# Patient Record
Sex: MALE | Race: WHITE | Employment: OTHER | URBAN - NONMETROPOLITAN AREA
[De-identification: names, ages, dates, MRNs, and addresses within clinical notes are randomized per-mention and may not be internally consistent; named-entity substitution may affect disease eponyms.]

---

## 2017-01-17 ENCOUNTER — OFFICE VISIT (OUTPATIENT)
Dept: UROLOGY | Age: 67
End: 2017-01-17

## 2017-01-17 VITALS — WEIGHT: 210 LBS | BODY MASS INDEX: 28.44 KG/M2 | HEIGHT: 72 IN

## 2017-01-17 DIAGNOSIS — R97.20 ELEVATED PSA: Primary | ICD-10-CM

## 2017-01-17 LAB
BILIRUBIN, POC: NORMAL
BLOOD URINE, POC: NORMAL
CLARITY, POC: NORMAL
COLOR, POC: NORMAL
GLUCOSE URINE, POC: NORMAL
KETONES, POC: NORMAL
LEUKOCYTE EST, POC: NORMAL
NITRITE, POC: NORMAL
PH, POC: NORMAL
PROTEIN, POC: NORMAL
SPECIFIC GRAVITY, POC: NORMAL
UROBILINOGEN, POC: NORMAL

## 2017-01-17 PROCEDURE — G8484 FLU IMMUNIZE NO ADMIN: HCPCS | Performed by: NURSE PRACTITIONER

## 2017-01-17 PROCEDURE — 99214 OFFICE O/P EST MOD 30 MIN: CPT | Performed by: NURSE PRACTITIONER

## 2017-01-17 PROCEDURE — 4040F PNEUMOC VAC/ADMIN/RCVD: CPT | Performed by: NURSE PRACTITIONER

## 2017-01-17 PROCEDURE — 4004F PT TOBACCO SCREEN RCVD TLK: CPT | Performed by: NURSE PRACTITIONER

## 2017-01-17 PROCEDURE — 3017F COLORECTAL CA SCREEN DOC REV: CPT | Performed by: NURSE PRACTITIONER

## 2017-01-17 PROCEDURE — 81003 URINALYSIS AUTO W/O SCOPE: CPT | Performed by: NURSE PRACTITIONER

## 2017-01-17 PROCEDURE — G8420 CALC BMI NORM PARAMETERS: HCPCS | Performed by: NURSE PRACTITIONER

## 2017-01-17 PROCEDURE — 1123F ACP DISCUSS/DSCN MKR DOCD: CPT | Performed by: NURSE PRACTITIONER

## 2017-01-17 PROCEDURE — G8427 DOCREV CUR MEDS BY ELIG CLIN: HCPCS | Performed by: NURSE PRACTITIONER

## 2017-06-15 LAB — PROSTATE SPECIFIC ANTIGEN: 6.54 NG/ML

## 2017-06-20 ENCOUNTER — OFFICE VISIT (OUTPATIENT)
Dept: UROLOGY | Age: 67
End: 2017-06-20

## 2017-06-20 VITALS — WEIGHT: 200 LBS | BODY MASS INDEX: 27.09 KG/M2 | HEIGHT: 72 IN

## 2017-06-20 DIAGNOSIS — R97.20 ELEVATED PSA: Primary | ICD-10-CM

## 2017-06-20 PROCEDURE — 3017F COLORECTAL CA SCREEN DOC REV: CPT | Performed by: NURSE PRACTITIONER

## 2017-06-20 PROCEDURE — 1123F ACP DISCUSS/DSCN MKR DOCD: CPT | Performed by: NURSE PRACTITIONER

## 2017-06-20 PROCEDURE — G8419 CALC BMI OUT NRM PARAM NOF/U: HCPCS | Performed by: NURSE PRACTITIONER

## 2017-06-20 PROCEDURE — 4004F PT TOBACCO SCREEN RCVD TLK: CPT | Performed by: NURSE PRACTITIONER

## 2017-06-20 PROCEDURE — 81002 URINALYSIS NONAUTO W/O SCOPE: CPT | Performed by: NURSE PRACTITIONER

## 2017-06-20 PROCEDURE — 4040F PNEUMOC VAC/ADMIN/RCVD: CPT | Performed by: NURSE PRACTITIONER

## 2017-06-20 PROCEDURE — 99213 OFFICE O/P EST LOW 20 MIN: CPT | Performed by: NURSE PRACTITIONER

## 2017-06-20 PROCEDURE — G8427 DOCREV CUR MEDS BY ELIG CLIN: HCPCS | Performed by: NURSE PRACTITIONER

## 2017-06-20 RX ORDER — COLCHICINE 0.6 MG/1
0.6 TABLET ORAL DAILY
COMMUNITY
End: 2017-08-17 | Stop reason: ALTCHOICE

## 2017-06-27 ENCOUNTER — TELEPHONE (OUTPATIENT)
Dept: UROLOGY | Age: 67
End: 2017-06-27

## 2017-06-27 RX ORDER — GENTAMICIN SULFATE 40 MG/ML
80 INJECTION, SOLUTION INTRAMUSCULAR; INTRAVENOUS ONCE
Qty: 2 ML | Refills: 0 | Status: SHIPPED | OUTPATIENT
Start: 2017-06-27 | End: 2017-06-27

## 2017-06-27 RX ORDER — CIPROFLOXACIN 500 MG/1
500 TABLET, FILM COATED ORAL 2 TIMES DAILY
Qty: 6 TABLET | Refills: 0 | Status: SHIPPED | OUTPATIENT
Start: 2017-06-27 | End: 2017-07-03

## 2017-06-28 ENCOUNTER — TELEPHONE (OUTPATIENT)
Dept: UROLOGY | Age: 67
End: 2017-06-28

## 2017-08-03 ENCOUNTER — PROCEDURE VISIT (OUTPATIENT)
Dept: UROLOGY | Age: 67
End: 2017-08-03
Payer: MEDICARE

## 2017-08-03 VITALS
DIASTOLIC BLOOD PRESSURE: 72 MMHG | BODY MASS INDEX: 28.04 KG/M2 | WEIGHT: 207 LBS | SYSTOLIC BLOOD PRESSURE: 118 MMHG | HEIGHT: 72 IN

## 2017-08-03 DIAGNOSIS — R97.20 ELEVATED PSA: Primary | ICD-10-CM

## 2017-08-03 PROCEDURE — 55700 PR BIOPSY OF PROSTATE,NEEDLE/PUNCH: CPT | Performed by: UROLOGY

## 2017-08-03 PROCEDURE — 76872 US TRANSRECTAL: CPT | Performed by: UROLOGY

## 2017-08-03 PROCEDURE — 99999 PR OFFICE/OUTPT VISIT,PROCEDURE ONLY: CPT | Performed by: UROLOGY

## 2017-08-03 PROCEDURE — 4004F PT TOBACCO SCREEN RCVD TLK: CPT | Performed by: UROLOGY

## 2017-08-03 PROCEDURE — 96372 THER/PROPH/DIAG INJ SC/IM: CPT | Performed by: UROLOGY

## 2017-08-03 PROCEDURE — 76942 ECHO GUIDE FOR BIOPSY: CPT | Performed by: UROLOGY

## 2017-08-03 RX ORDER — GENTAMICIN SULFATE 40 MG/ML
80 INJECTION, SOLUTION INTRAMUSCULAR; INTRAVENOUS ONCE
Status: COMPLETED | OUTPATIENT
Start: 2017-08-03 | End: 2017-08-03

## 2017-08-03 RX ORDER — ALLOPURINOL 100 MG/1
100 TABLET ORAL 2 TIMES DAILY
Status: ON HOLD | COMMUNITY
End: 2020-02-26 | Stop reason: DRUGHIGH

## 2017-08-03 RX ADMIN — GENTAMICIN SULFATE 80 MG: 40 INJECTION, SOLUTION INTRAMUSCULAR; INTRAVENOUS at 10:56

## 2017-08-17 ENCOUNTER — TELEPHONE (OUTPATIENT)
Dept: UROLOGY | Age: 67
End: 2017-08-17

## 2017-08-17 ENCOUNTER — OFFICE VISIT (OUTPATIENT)
Dept: UROLOGY | Age: 67
End: 2017-08-17
Payer: MEDICARE

## 2017-08-17 VITALS
WEIGHT: 200 LBS | HEIGHT: 72 IN | DIASTOLIC BLOOD PRESSURE: 68 MMHG | BODY MASS INDEX: 27.09 KG/M2 | SYSTOLIC BLOOD PRESSURE: 112 MMHG

## 2017-08-17 DIAGNOSIS — C61 PROSTATE CANCER (HCC): Primary | ICD-10-CM

## 2017-08-17 DIAGNOSIS — R30.0 DYSURIA: ICD-10-CM

## 2017-08-17 LAB
BILIRUBIN URINE: NEGATIVE
BLOOD URINE, POC: NORMAL
CHARACTER, URINE: CLEAR
COLOR, URINE: YELLOW
GLUCOSE URINE: NEGATIVE MG/DL
KETONES, URINE: NEGATIVE
LEUKOCYTE CLUMPS, URINE: NEGATIVE
NITRITE, URINE: NEGATIVE
PH, URINE: 5.5
PROTEIN, URINE: NEGATIVE MG/DL
SPECIFIC GRAVITY, URINE: 1.02 (ref 1–1.03)
UROBILINOGEN, URINE: 0.2 EU/DL

## 2017-08-17 PROCEDURE — 3017F COLORECTAL CA SCREEN DOC REV: CPT | Performed by: UROLOGY

## 2017-08-17 PROCEDURE — 4004F PT TOBACCO SCREEN RCVD TLK: CPT | Performed by: UROLOGY

## 2017-08-17 PROCEDURE — 99215 OFFICE O/P EST HI 40 MIN: CPT | Performed by: UROLOGY

## 2017-08-17 PROCEDURE — G8428 CUR MEDS NOT DOCUMENT: HCPCS | Performed by: UROLOGY

## 2017-08-17 PROCEDURE — G8417 CALC BMI ABV UP PARAM F/U: HCPCS | Performed by: UROLOGY

## 2017-08-17 PROCEDURE — 1123F ACP DISCUSS/DSCN MKR DOCD: CPT | Performed by: UROLOGY

## 2017-08-17 PROCEDURE — 4040F PNEUMOC VAC/ADMIN/RCVD: CPT | Performed by: UROLOGY

## 2017-08-17 PROCEDURE — 81003 URINALYSIS AUTO W/O SCOPE: CPT | Performed by: UROLOGY

## 2017-08-17 RX ORDER — GLUCOSAMINE SULFATE 500 MG
1 CAPSULE ORAL DAILY
COMMUNITY

## 2017-08-17 ASSESSMENT — ENCOUNTER SYMPTOMS
EYE REDNESS: 0
CONSTIPATION: 0
DIARRHEA: 0
EYE PAIN: 0
SHORTNESS OF BREATH: 0
CHEST TIGHTNESS: 0

## 2017-08-22 DIAGNOSIS — C61 PROSTATE CANCER (HCC): Primary | ICD-10-CM

## 2017-09-21 ENCOUNTER — OFFICE VISIT (OUTPATIENT)
Dept: UROLOGY | Age: 67
End: 2017-09-21
Payer: MEDICARE

## 2017-09-21 ENCOUNTER — TELEPHONE (OUTPATIENT)
Dept: UROLOGY | Age: 67
End: 2017-09-21

## 2017-09-21 VITALS
DIASTOLIC BLOOD PRESSURE: 70 MMHG | HEIGHT: 72 IN | WEIGHT: 209 LBS | SYSTOLIC BLOOD PRESSURE: 122 MMHG | BODY MASS INDEX: 28.31 KG/M2

## 2017-09-21 DIAGNOSIS — Z01.818 PRE-OP TESTING: Primary | ICD-10-CM

## 2017-09-21 DIAGNOSIS — C61 PROSTATE CANCER (HCC): ICD-10-CM

## 2017-09-21 DIAGNOSIS — I10 ESSENTIAL HYPERTENSION: ICD-10-CM

## 2017-09-21 DIAGNOSIS — C61 MALIGNANT NEOPLASM OF PROSTATE (HCC): Primary | ICD-10-CM

## 2017-09-21 LAB
BILIRUBIN URINE: NEGATIVE
BLOOD URINE, POC: NORMAL
CHARACTER, URINE: CLEAR
COLOR, URINE: YELLOW
GLUCOSE URINE: NEGATIVE MG/DL
KETONES, URINE: NEGATIVE
LEUKOCYTE CLUMPS, URINE: NEGATIVE
NITRITE, URINE: NEGATIVE
PH, URINE: 5
PROTEIN, URINE: NEGATIVE MG/DL
SPECIFIC GRAVITY, URINE: <= 1.005 (ref 1–1.03)
UROBILINOGEN, URINE: 0.2 EU/DL

## 2017-09-21 PROCEDURE — 3017F COLORECTAL CA SCREEN DOC REV: CPT | Performed by: UROLOGY

## 2017-09-21 PROCEDURE — G8427 DOCREV CUR MEDS BY ELIG CLIN: HCPCS | Performed by: UROLOGY

## 2017-09-21 PROCEDURE — 4040F PNEUMOC VAC/ADMIN/RCVD: CPT | Performed by: UROLOGY

## 2017-09-21 PROCEDURE — 81003 URINALYSIS AUTO W/O SCOPE: CPT | Performed by: UROLOGY

## 2017-09-21 PROCEDURE — G8417 CALC BMI ABV UP PARAM F/U: HCPCS | Performed by: UROLOGY

## 2017-09-21 PROCEDURE — 1123F ACP DISCUSS/DSCN MKR DOCD: CPT | Performed by: UROLOGY

## 2017-09-21 PROCEDURE — 4004F PT TOBACCO SCREEN RCVD TLK: CPT | Performed by: UROLOGY

## 2017-09-21 PROCEDURE — 99215 OFFICE O/P EST HI 40 MIN: CPT | Performed by: UROLOGY

## 2017-09-21 ASSESSMENT — ENCOUNTER SYMPTOMS
EYE REDNESS: 0
BACK PAIN: 0
VOMITING: 0
SHORTNESS OF BREATH: 0
CHEST TIGHTNESS: 0
DIARRHEA: 0
EYE PAIN: 0

## 2017-09-29 LAB
ALBUMIN SERPL-MCNC: 4 G/DL
ALP BLD-CCNC: 53 U/L
ALT SERPL-CCNC: 52 U/L
ANION GAP SERPL CALCULATED.3IONS-SCNC: 17 MMOL/L
AST SERPL-CCNC: 29 U/L
BASOPHILS ABSOLUTE: 0.1 /ΜL
BASOPHILS RELATIVE PERCENT: 0.8 %
BILIRUB SERPL-MCNC: 0.6 MG/DL (ref 0.1–1.4)
BUN BLDV-MCNC: 16 MG/DL
CALCIUM SERPL-MCNC: 9.1 MG/DL
CHLORIDE BLD-SCNC: 105 MMOL/L
CO2: 24 MMOL/L
CREAT SERPL-MCNC: 1.13 MG/DL
EOSINOPHILS ABSOLUTE: 0.4 /ΜL
EOSINOPHILS RELATIVE PERCENT: 5.8 %
GFR CALCULATED: NORMAL
GLUCOSE BLD-MCNC: 97 MG/DL
HCT VFR BLD CALC: 44.7 % (ref 41–53)
HEMOGLOBIN: 15.3 G/DL (ref 13.5–17.5)
LYMPHOCYTES ABSOLUTE: 1.7 /ΜL
LYMPHOCYTES RELATIVE PERCENT: 24 %
MCH RBC QN AUTO: 32.1 PG
MCHC RBC AUTO-ENTMCNC: 34.2 G/DL
MCV RBC AUTO: 93.9 FL
MONOCYTES ABSOLUTE: 0.8 /ΜL
MONOCYTES RELATIVE PERCENT: 11.3 %
NEUTROPHILS ABSOLUTE: 4.1 /ΜL
NEUTROPHILS RELATIVE PERCENT: 58.1 %
PDW BLD-RTO: 14.5 %
PLATELET # BLD: 180 K/ΜL
PMV BLD AUTO: 10.8 FL
POTASSIUM SERPL-SCNC: 4.3 MMOL/L
RBC # BLD: 4.76 10^6/ΜL
SODIUM BLD-SCNC: 142 MMOL/L
TOTAL PROTEIN: 6.8
WBC # BLD: 7 10^3/ML

## 2017-10-02 ENCOUNTER — TELEPHONE (OUTPATIENT)
Dept: UROLOGY | Age: 67
End: 2017-10-02

## 2017-10-02 NOTE — TELEPHONE ENCOUNTER
Robotic Surgery Scheduling Form   [facility] [address]    Phone * [phone]   Surgical Scheduling Direct line Phone * [phone]  Fax * [phone]      Naeem Toribio      1950    male    [address] [place] [address]   Marital Status:            Home Phone: [phone]   Cell Phone:   No relevant phone numbers on file. Surgeon:  Saige Cash   Surgery Date:  October 6, 2017     Time:   1030 am     Procedure:  Robot Assisted Laparoscopic Radical Prostatectomy                                                                     Inpatient       Diagnosis:      Prostate cancer     Important Medical History:      Special Inst/Equip:      CPT Codes:  92892        Latex Allergy:    No       Cardiac Device:       Case Location:  Main OR     Preadmission Testing: Phone Call       PAT Date and Time: ________________________________    PAT Confirmation #: _________________________________    Post Op Visit:  ______________________________________    Need Preop Cardiac Clearance:      No   Medical only      Does patient have Cardiologist/physician?       Dr Padmini Storey      Surgery Conformation #:  ______________________________________________    Elle Balint: __________________________________ Date:____________________    Firefly:   No      Dual Console:   No     Ultrasound:   No       Single Site: no       RNFA (colon resection only):  no Assisting Surgeon:     Gregorio Dupree, [address] [facility] [place] Name:     [employer]

## 2017-10-03 ENCOUNTER — HOSPITAL ENCOUNTER (OUTPATIENT)
Age: 67
Discharge: HOME | End: 2017-10-03
Payer: MEDICARE

## 2017-10-03 DIAGNOSIS — Z01.818 PRE-OP TESTING: ICD-10-CM

## 2017-10-03 DIAGNOSIS — C61 PROSTATE CANCER (HCC): ICD-10-CM

## 2017-10-03 LAB
AMORPHOUS: ABNORMAL
BACTERIA: ABNORMAL
BILIRUBIN URINE: NEGATIVE
BLOOD, URINE: ABNORMAL
CASTS UA: ABNORMAL /LPF
CHARACTER, URINE: CLEAR
COLOR: YELLOW
CRYSTALS, UA: ABNORMAL
EPITHELIAL CELLS, UA: ABNORMAL /HPF
GLUCOSE, URINE: NEGATIVE MG/DL
KETONES, URINE: NEGATIVE
LEUKOCYTE ESTERASE, URINE: NEGATIVE
MUCUS: ABNORMAL
NITRITE, URINE: NEGATIVE
PH UA: 6 (ref 5–9)
PROTEIN UA: NEGATIVE MG/DL
RBC UA: ABNORMAL /HPF
REFLEX TO URINE C & S: ABNORMAL
SPECIFIC GRAVITY UA: 1.02 (ref 1–1.03)
UROBILINOGEN, URINE: 0.2 EU/DL (ref 0–1)
WBC UA: ABNORMAL /HPF

## 2017-10-03 PROCEDURE — 81001 URINALYSIS AUTO W/SCOPE: CPT

## 2017-10-03 RX ORDER — DIPHENHYDRAMINE HCL 25 MG
25 TABLET ORAL EVERY 8 HOURS PRN
COMMUNITY

## 2017-10-03 RX ORDER — LISINOPRIL AND HYDROCHLOROTHIAZIDE 12.5; 1 MG/1; MG/1
1 TABLET ORAL DAILY
COMMUNITY

## 2017-10-03 RX ORDER — MELOXICAM 15 MG/1
15 TABLET ORAL DAILY PRN
COMMUNITY
End: 2018-06-22

## 2017-10-03 NOTE — PROGRESS NOTES
NPO after midnight  [employer] and drivers license  Wear comfortable clean clothing  Do not bring jewelry or valuables  Shower night before and morning of surgery with a liquid antibacterial soap  Bring  medications  Follow all instructions given by your physician   needed at discharge    In preparation for their surgical procedure above patient was screened for Obstructive Sleep Apnea (COLETTE) using the STOP-Bang Questionnaire by the [place] [address] department. This is a pre-surgical screening tool for patient safety and serves as a recommendation, this WILL NOT cause cancellation of surgery. STOP-Bang Questionnaire  * Do you currently see a pulmonologist?  No      If yes STOP, do not complete. Patient follows with  .    1. Do you snore loudly (able to be heard in the next room)? No    2. Do you often feel tired or sleepy during the daytime? No       3. Has anyone ever told you that you stop breathing during your sleep? No    4. Do you have or are you being treated for high blood pressure? Yes      5. BMI more than 35? BMI (Calculated): 28.4        No    6. Age over 48 years? 79 y.o. Yes    7. Neck Circumference greater than 17 inches for male or 16 inches for female? Measured           (visits only)            Not Applicable    8. Gender Male? Yes      TOTAL SCORE: 3    COLETTE - Low Risk : Yes to 0 - 2 questions  COLETTE - Intermediate Risk : Yes to 3 - 4 questions  COLETTE - High Risk : Yes to 5 - 8 questions    Adapted from:   STOP Questionnaire: A Tool to Screen Patients for Obstructive Sleep Apnea   Luberta Boas, F.R.C.P.C., MANN Hyde.B.B.S., Leni Donnelly M.D., Richmond Quick. Charis Rose, Ph.D., Renny Padron M.B.B.S., Isadora Partida M.Sc., Hira Remy M.D., Karan Cross. CIARA MarieC.P.C.    Anesthesiology 2008; 273:289-90 Copyright 2008, the [address] [employer]est[employer], [place] [address]. ----------------------------------------------------------------------------------------------------------------

## 2017-10-04 NOTE — H&P
APPENDECTOMY  1970's    COLONOSCOPY  2009    OTHER SURGICAL HISTORY  2013    mole removal    TONSILLECTOMY  1950's       Allergies   Allergen Reactions    Cephalosporins Nausea And Vomiting    Tetracyclines & Related Rash       No current facility-administered medications for this encounter. Current Outpatient Prescriptions:     lisinopril-hydrochlorothiazide (PRINZIDE;ZESTORETIC) 10-12.5 MG per tablet, Take 1 tablet by mouth daily, Disp: , Rfl:     diphenhydrAMINE (BENADRYL) 25 MG tablet, Take 25 mg by mouth every 8 hours as needed for Itching or Allergies, Disp: , Rfl:     meloxicam (MOBIC) 15 MG tablet, Take 15 mg by mouth daily as needed for Pain, Disp: , Rfl:     Glucosamine 500 MG CAPS, Take by mouth, Disp: , Rfl:     allopurinol (ZYLOPRIM) 100 MG tablet, Take 100 mg by mouth 2 times daily , Disp: , Rfl:     B Complex Vitamins (VITAMIN B COMPLEX PO), Take 1 tablet by mouth daily. , Disp: , Rfl:     Review of Systems   Constitutional: Negative for chills and fever. HENT: Negative for congestion and drooling. Eyes: Negative for pain and redness. Respiratory: Negative for chest tightness and shortness of breath. Cardiovascular: Negative for chest pain and leg swelling. Gastrointestinal: Negative for diarrhea and vomiting. Genitourinary: Negative for difficulty urinating, frequency, hematuria and urgency. Musculoskeletal: Negative for back pain and neck pain. Skin: Negative for pallor and rash. Neurological: Negative for dizziness, numbness and headaches. Hematological: Does not bruise/bleed easily. Psychiatric/Behavioral: Negative for agitation and confusion. Ht 6' (1.829 m)  Wt 209 lb (94.8 kg)  BMI 28.35 kg/m2    Objective:   Physical Exam   Constitutional: He is oriented to person, place, and time. Vital signs are normal. He appears well-developed and well-nourished. He is cooperative. No distress. HENT:   Head: Normocephalic and atraumatic.    Mouth/Throat:

## 2017-10-06 ENCOUNTER — ANESTHESIA (OUTPATIENT)
Dept: OPERATING ROOM | Age: 67
DRG: 708 | End: 2017-10-06
Payer: MEDICARE

## 2017-10-06 ENCOUNTER — HOSPITAL ENCOUNTER (INPATIENT)
Age: 67
LOS: 1 days | Discharge: HOME | DRG: 708 | End: 2017-10-07
Attending: UROLOGY | Admitting: UROLOGY
Payer: MEDICARE

## 2017-10-06 ENCOUNTER — ANESTHESIA EVENT (OUTPATIENT)
Dept: OPERATING ROOM | Age: 67
DRG: 708 | End: 2017-10-06
Payer: MEDICARE

## 2017-10-06 VITALS — SYSTOLIC BLOOD PRESSURE: 126 MMHG | DIASTOLIC BLOOD PRESSURE: 78 MMHG | OXYGEN SATURATION: 94 % | TEMPERATURE: 97 F

## 2017-10-06 LAB
ABO: NORMAL
ANTIBODY SCREEN: NORMAL
RH FACTOR: NORMAL

## 2017-10-06 PROCEDURE — 36415 COLL VENOUS BLD VENIPUNCTURE: CPT

## 2017-10-06 PROCEDURE — 6360000002 HC RX W HCPCS: Performed by: NURSE ANESTHETIST, CERTIFIED REGISTERED

## 2017-10-06 PROCEDURE — 88309 TISSUE EXAM BY PATHOLOGIST: CPT

## 2017-10-06 PROCEDURE — 07BC4ZX EXCISION OF PELVIS LYMPHATIC, PERCUTANEOUS ENDOSCOPIC APPROACH, DIAGNOSTIC: ICD-10-PCS | Performed by: UROLOGY

## 2017-10-06 PROCEDURE — 1200000000 HC SEMI PRIVATE

## 2017-10-06 PROCEDURE — 2500000003 HC RX 250 WO HCPCS: Performed by: NURSE ANESTHETIST, CERTIFIED REGISTERED

## 2017-10-06 PROCEDURE — 2580000003 HC RX 258: Performed by: PHYSICIAN ASSISTANT

## 2017-10-06 PROCEDURE — 3600000019 HC SURGERY ROBOT ADDTL 15MIN: Performed by: UROLOGY

## 2017-10-06 PROCEDURE — 2780000010 HC IMPLANT OTHER: Performed by: UROLOGY

## 2017-10-06 PROCEDURE — 38571 LAPAROSCOPY LYMPHADENECTOMY: CPT | Performed by: UROLOGY

## 2017-10-06 PROCEDURE — 0VT34ZZ RESECTION OF BILATERAL SEMINAL VESICLES, PERCUTANEOUS ENDOSCOPIC APPROACH: ICD-10-PCS | Performed by: UROLOGY

## 2017-10-06 PROCEDURE — 3700000000 HC ANESTHESIA ATTENDED CARE: Performed by: UROLOGY

## 2017-10-06 PROCEDURE — 88307 TISSUE EXAM BY PATHOLOGIST: CPT

## 2017-10-06 PROCEDURE — 2580000003 HC RX 258: Performed by: UROLOGY

## 2017-10-06 PROCEDURE — 55866 LAPS SURG PRST8ECT RPBIC RAD: CPT | Performed by: UROLOGY

## 2017-10-06 PROCEDURE — 3700000001 HC ADD 15 MINUTES (ANESTHESIA): Performed by: UROLOGY

## 2017-10-06 PROCEDURE — 7100000000 HC PACU RECOVERY - FIRST 15 MIN: Performed by: UROLOGY

## 2017-10-06 PROCEDURE — 8E0W4CZ ROBOTIC ASSISTED PROCEDURE OF TRUNK REGION, PERCUTANEOUS ENDOSCOPIC APPROACH: ICD-10-PCS | Performed by: UROLOGY

## 2017-10-06 PROCEDURE — A6257 TRANSPARENT FILM <= 16 SQ IN: HCPCS | Performed by: UROLOGY

## 2017-10-06 PROCEDURE — 0VT04ZZ RESECTION OF PROSTATE, PERCUTANEOUS ENDOSCOPIC APPROACH: ICD-10-PCS | Performed by: UROLOGY

## 2017-10-06 PROCEDURE — 2500000003 HC RX 250 WO HCPCS: Performed by: UROLOGY

## 2017-10-06 PROCEDURE — 86850 RBC ANTIBODY SCREEN: CPT

## 2017-10-06 PROCEDURE — 6370000000 HC RX 637 (ALT 250 FOR IP): Performed by: PHYSICIAN ASSISTANT

## 2017-10-06 PROCEDURE — C1773 RET DEV, INSERTABLE: HCPCS | Performed by: UROLOGY

## 2017-10-06 PROCEDURE — 2500000003 HC RX 250 WO HCPCS: Performed by: PHYSICIAN ASSISTANT

## 2017-10-06 PROCEDURE — 86900 BLOOD TYPING SEROLOGIC ABO: CPT

## 2017-10-06 PROCEDURE — 3600000009 HC SURGERY ROBOT BASE: Performed by: UROLOGY

## 2017-10-06 PROCEDURE — A6258 TRANSPARENT FILM >16<=48 IN: HCPCS | Performed by: UROLOGY

## 2017-10-06 PROCEDURE — S2900 ROBOTIC SURGICAL SYSTEM: HCPCS | Performed by: UROLOGY

## 2017-10-06 PROCEDURE — 86901 BLOOD TYPING SEROLOGIC RH(D): CPT

## 2017-10-06 PROCEDURE — 7100000001 HC PACU RECOVERY - ADDTL 15 MIN: Performed by: UROLOGY

## 2017-10-06 PROCEDURE — 2580000003 HC RX 258: Performed by: NURSE ANESTHETIST, CERTIFIED REGISTERED

## 2017-10-06 DEVICE — AGENT HEMOSTATIC SURGIFLOW MATRIX KIT W/THROMBIN: Type: IMPLANTABLE DEVICE | Site: PELVIS | Status: FUNCTIONAL

## 2017-10-06 RX ORDER — MORPHINE SULFATE 2 MG/ML
4 INJECTION, SOLUTION INTRAMUSCULAR; INTRAVENOUS
Status: DISCONTINUED | OUTPATIENT
Start: 2017-10-06 | End: 2017-10-07 | Stop reason: HOSPADM

## 2017-10-06 RX ORDER — SODIUM CHLORIDE 0.9 % (FLUSH) 0.9 %
10 SYRINGE (ML) INJECTION PRN
Status: DISCONTINUED | OUTPATIENT
Start: 2017-10-06 | End: 2017-10-07 | Stop reason: HOSPADM

## 2017-10-06 RX ORDER — PROPOFOL 10 MG/ML
INJECTION, EMULSION INTRAVENOUS PRN
Status: DISCONTINUED | OUTPATIENT
Start: 2017-10-06 | End: 2017-10-06 | Stop reason: SDUPTHER

## 2017-10-06 RX ORDER — ONDANSETRON 2 MG/ML
4 INJECTION INTRAMUSCULAR; INTRAVENOUS EVERY 6 HOURS PRN
Status: DISCONTINUED | OUTPATIENT
Start: 2017-10-06 | End: 2017-10-07 | Stop reason: HOSPADM

## 2017-10-06 RX ORDER — SODIUM CHLORIDE 9 MG/ML
INJECTION, SOLUTION INTRAVENOUS CONTINUOUS
Status: DISCONTINUED | OUTPATIENT
Start: 2017-10-06 | End: 2017-10-06 | Stop reason: SDUPTHER

## 2017-10-06 RX ORDER — NEOSTIGMINE METHYLSULFATE 1 MG/ML
INJECTION, SOLUTION INTRAVENOUS PRN
Status: DISCONTINUED | OUTPATIENT
Start: 2017-10-06 | End: 2017-10-06 | Stop reason: SDUPTHER

## 2017-10-06 RX ORDER — FENTANYL CITRATE 50 UG/ML
INJECTION, SOLUTION INTRAMUSCULAR; INTRAVENOUS PRN
Status: DISCONTINUED | OUTPATIENT
Start: 2017-10-06 | End: 2017-10-06 | Stop reason: SDUPTHER

## 2017-10-06 RX ORDER — SODIUM CHLORIDE 9 MG/ML
INJECTION, SOLUTION INTRAVENOUS CONTINUOUS
Status: DISCONTINUED | OUTPATIENT
Start: 2017-10-06 | End: 2017-10-07 | Stop reason: HOSPADM

## 2017-10-06 RX ORDER — BUPIVACAINE HYDROCHLORIDE AND EPINEPHRINE 5; 5 MG/ML; UG/ML
INJECTION, SOLUTION EPIDURAL; INTRACAUDAL; PERINEURAL PRN
Status: DISCONTINUED | OUTPATIENT
Start: 2017-10-06 | End: 2017-10-06 | Stop reason: HOSPADM

## 2017-10-06 RX ORDER — ONDANSETRON 2 MG/ML
INJECTION INTRAMUSCULAR; INTRAVENOUS PRN
Status: DISCONTINUED | OUTPATIENT
Start: 2017-10-06 | End: 2017-10-06 | Stop reason: SDUPTHER

## 2017-10-06 RX ORDER — LISINOPRIL 10 MG/1
10 TABLET ORAL DAILY
Status: DISCONTINUED | OUTPATIENT
Start: 2017-10-07 | End: 2017-10-07 | Stop reason: HOSPADM

## 2017-10-06 RX ORDER — ATROPA BELLADONNA AND OPIUM 16.2; 3 MG/1; MG/1
30 SUPPOSITORY RECTAL EVERY 8 HOURS PRN
Status: DISCONTINUED | OUTPATIENT
Start: 2017-10-06 | End: 2017-10-07 | Stop reason: HOSPADM

## 2017-10-06 RX ORDER — EPHEDRINE SULFATE 50 MG/ML
INJECTION INTRAVENOUS PRN
Status: DISCONTINUED | OUTPATIENT
Start: 2017-10-06 | End: 2017-10-06 | Stop reason: SDUPTHER

## 2017-10-06 RX ORDER — HYDROMORPHONE HCL 110MG/55ML
PATIENT CONTROLLED ANALGESIA SYRINGE INTRAVENOUS PRN
Status: DISCONTINUED | OUTPATIENT
Start: 2017-10-06 | End: 2017-10-06 | Stop reason: SDUPTHER

## 2017-10-06 RX ORDER — MORPHINE SULFATE 2 MG/ML
2 INJECTION, SOLUTION INTRAMUSCULAR; INTRAVENOUS
Status: DISCONTINUED | OUTPATIENT
Start: 2017-10-06 | End: 2017-10-07 | Stop reason: HOSPADM

## 2017-10-06 RX ORDER — HYDROCODONE BITARTRATE AND ACETAMINOPHEN 5; 325 MG/1; MG/1
2 TABLET ORAL EVERY 4 HOURS PRN
Status: DISCONTINUED | OUTPATIENT
Start: 2017-10-06 | End: 2017-10-07 | Stop reason: HOSPADM

## 2017-10-06 RX ORDER — CLINDAMYCIN PHOSPHATE 150 MG/ML
INJECTION, SOLUTION INTRAVENOUS PRN
Status: DISCONTINUED | OUTPATIENT
Start: 2017-10-06 | End: 2017-10-06 | Stop reason: SDUPTHER

## 2017-10-06 RX ORDER — LIDOCAINE HYDROCHLORIDE 10 MG/ML
INJECTION, SOLUTION INFILTRATION; PERINEURAL PRN
Status: DISCONTINUED | OUTPATIENT
Start: 2017-10-06 | End: 2017-10-06 | Stop reason: SDUPTHER

## 2017-10-06 RX ORDER — LISINOPRIL AND HYDROCHLOROTHIAZIDE 12.5; 1 MG/1; MG/1
1 TABLET ORAL DAILY
Status: DISCONTINUED | OUTPATIENT
Start: 2017-10-06 | End: 2017-10-06 | Stop reason: CLARIF

## 2017-10-06 RX ORDER — HYDROCODONE BITARTRATE AND ACETAMINOPHEN 5; 325 MG/1; MG/1
1 TABLET ORAL EVERY 4 HOURS PRN
Status: DISCONTINUED | OUTPATIENT
Start: 2017-10-06 | End: 2017-10-07 | Stop reason: HOSPADM

## 2017-10-06 RX ORDER — BACITRACIN, NEOMYCIN, POLYMYXIN B 400; 3.5; 5 [USP'U]/G; MG/G; [USP'U]/G
OINTMENT TOPICAL 2 TIMES DAILY
Status: DISCONTINUED | OUTPATIENT
Start: 2017-10-06 | End: 2017-10-07 | Stop reason: HOSPADM

## 2017-10-06 RX ORDER — FAMOTIDINE 20 MG/1
20 TABLET, FILM COATED ORAL 2 TIMES DAILY
Status: DISCONTINUED | OUTPATIENT
Start: 2017-10-06 | End: 2017-10-07 | Stop reason: HOSPADM

## 2017-10-06 RX ORDER — ACETAMINOPHEN 325 MG/1
650 TABLET ORAL EVERY 4 HOURS PRN
Status: DISCONTINUED | OUTPATIENT
Start: 2017-10-06 | End: 2017-10-07 | Stop reason: HOSPADM

## 2017-10-06 RX ORDER — SODIUM CHLORIDE 0.9 % (FLUSH) 0.9 %
10 SYRINGE (ML) INJECTION EVERY 12 HOURS SCHEDULED
Status: DISCONTINUED | OUTPATIENT
Start: 2017-10-06 | End: 2017-10-07 | Stop reason: HOSPADM

## 2017-10-06 RX ORDER — HYDROCHLOROTHIAZIDE 12.5 MG/1
12.5 CAPSULE, GELATIN COATED ORAL DAILY
Status: DISCONTINUED | OUTPATIENT
Start: 2017-10-07 | End: 2017-10-07 | Stop reason: HOSPADM

## 2017-10-06 RX ORDER — ROCURONIUM BROMIDE 10 MG/ML
INJECTION, SOLUTION INTRAVENOUS PRN
Status: DISCONTINUED | OUTPATIENT
Start: 2017-10-06 | End: 2017-10-06 | Stop reason: SDUPTHER

## 2017-10-06 RX ORDER — DIPHENHYDRAMINE HCL 25 MG
25 TABLET ORAL EVERY 8 HOURS PRN
Status: DISCONTINUED | OUTPATIENT
Start: 2017-10-06 | End: 2017-10-07 | Stop reason: HOSPADM

## 2017-10-06 RX ORDER — MIDAZOLAM HYDROCHLORIDE 1 MG/ML
INJECTION INTRAMUSCULAR; INTRAVENOUS PRN
Status: DISCONTINUED | OUTPATIENT
Start: 2017-10-06 | End: 2017-10-06 | Stop reason: SDUPTHER

## 2017-10-06 RX ORDER — SODIUM CHLORIDE 9 MG/ML
INJECTION, SOLUTION INTRAVENOUS CONTINUOUS PRN
Status: DISCONTINUED | OUTPATIENT
Start: 2017-10-06 | End: 2017-10-06 | Stop reason: SDUPTHER

## 2017-10-06 RX ORDER — GLYCOPYRROLATE 0.2 MG/ML
INJECTION INTRAMUSCULAR; INTRAVENOUS PRN
Status: DISCONTINUED | OUTPATIENT
Start: 2017-10-06 | End: 2017-10-06 | Stop reason: SDUPTHER

## 2017-10-06 RX ORDER — ALLOPURINOL 100 MG/1
100 TABLET ORAL 2 TIMES DAILY
Status: DISCONTINUED | OUTPATIENT
Start: 2017-10-06 | End: 2017-10-07 | Stop reason: HOSPADM

## 2017-10-06 RX ORDER — DEXAMETHASONE SODIUM PHOSPHATE 4 MG/ML
INJECTION, SOLUTION INTRA-ARTICULAR; INTRALESIONAL; INTRAMUSCULAR; INTRAVENOUS; SOFT TISSUE PRN
Status: DISCONTINUED | OUTPATIENT
Start: 2017-10-06 | End: 2017-10-06 | Stop reason: SDUPTHER

## 2017-10-06 RX ORDER — CLINDAMYCIN PHOSPHATE 900 MG/50ML
900 INJECTION INTRAVENOUS EVERY 8 HOURS
Status: COMPLETED | OUTPATIENT
Start: 2017-10-06 | End: 2017-10-07

## 2017-10-06 RX ORDER — DOCUSATE SODIUM 100 MG/1
100 CAPSULE, LIQUID FILLED ORAL 2 TIMES DAILY
Status: DISCONTINUED | OUTPATIENT
Start: 2017-10-06 | End: 2017-10-07 | Stop reason: HOSPADM

## 2017-10-06 RX ADMIN — EPHEDRINE SULFATE 15 MG: 50 INJECTION, SOLUTION INTRAVENOUS at 11:48

## 2017-10-06 RX ADMIN — MIDAZOLAM HYDROCHLORIDE 2 MG: 1 INJECTION INTRAMUSCULAR; INTRAVENOUS at 11:14

## 2017-10-06 RX ADMIN — PHENYLEPHRINE HYDROCHLORIDE 100 MCG: 10 INJECTION INTRAMUSCULAR; INTRAVENOUS; SUBCUTANEOUS at 12:23

## 2017-10-06 RX ADMIN — SODIUM CHLORIDE: 9 INJECTION, SOLUTION INTRAVENOUS at 16:14

## 2017-10-06 RX ADMIN — PROPOFOL 160 MG: 10 INJECTION, EMULSION INTRAVENOUS at 11:20

## 2017-10-06 RX ADMIN — FENTANYL CITRATE 100 MCG: 50 INJECTION INTRAMUSCULAR; INTRAVENOUS at 13:24

## 2017-10-06 RX ADMIN — Medication 10 MG: at 12:38

## 2017-10-06 RX ADMIN — HYDROMORPHONE HYDROCHLORIDE 0.5 MG: 2 INJECTION INTRAMUSCULAR; INTRAVENOUS; SUBCUTANEOUS at 11:46

## 2017-10-06 RX ADMIN — LIDOCAINE HYDROCHLORIDE 50 MG: 10 INJECTION, SOLUTION INFILTRATION; PERINEURAL at 11:20

## 2017-10-06 RX ADMIN — Medication 10 ML: at 21:09

## 2017-10-06 RX ADMIN — FENTANYL CITRATE 50 MCG: 50 INJECTION INTRAMUSCULAR; INTRAVENOUS at 11:16

## 2017-10-06 RX ADMIN — Medication 40 MG: at 11:20

## 2017-10-06 RX ADMIN — PHENYLEPHRINE HYDROCHLORIDE 100 MCG: 10 INJECTION INTRAMUSCULAR; INTRAVENOUS; SUBCUTANEOUS at 13:00

## 2017-10-06 RX ADMIN — SODIUM CHLORIDE: 9 INJECTION, SOLUTION INTRAVENOUS at 12:40

## 2017-10-06 RX ADMIN — FENTANYL CITRATE 50 MCG: 50 INJECTION INTRAMUSCULAR; INTRAVENOUS at 11:22

## 2017-10-06 RX ADMIN — NEOSTIGMINE METHYLSULFATE 2.5 MG: 1 INJECTION, SOLUTION INTRAVENOUS at 13:06

## 2017-10-06 RX ADMIN — SODIUM CHLORIDE: 9 INJECTION, SOLUTION INTRAVENOUS at 11:22

## 2017-10-06 RX ADMIN — CLINDAMYCIN PHOSPHATE 600 MG: 150 INJECTION, SOLUTION INTRAVENOUS at 11:46

## 2017-10-06 RX ADMIN — HYDROMORPHONE HYDROCHLORIDE 0.5 MG: 2 INJECTION INTRAMUSCULAR; INTRAVENOUS; SUBCUTANEOUS at 13:13

## 2017-10-06 RX ADMIN — DOCUSATE SODIUM 100 MG: 100 CAPSULE ORAL at 21:05

## 2017-10-06 RX ADMIN — SODIUM CHLORIDE: 9 INJECTION, SOLUTION INTRAVENOUS at 09:31

## 2017-10-06 RX ADMIN — PHENYLEPHRINE HYDROCHLORIDE 100 MCG: 10 INJECTION INTRAMUSCULAR; INTRAVENOUS; SUBCUTANEOUS at 12:25

## 2017-10-06 RX ADMIN — DEXAMETHASONE SODIUM PHOSPHATE 4 MG: 4 INJECTION, SOLUTION INTRAMUSCULAR; INTRAVENOUS at 11:42

## 2017-10-06 RX ADMIN — Medication 10 MG: at 11:55

## 2017-10-06 RX ADMIN — BACITRACIN, NEOMYCIN, POLYMYXIN B 3.5 G: 400; 3.5; 5 OINTMENT TOPICAL at 21:04

## 2017-10-06 RX ADMIN — CLINDAMYCIN PHOSPHATE 900 MG: 18 INJECTION, SOLUTION INTRAMUSCULAR; INTRAVENOUS at 16:14

## 2017-10-06 RX ADMIN — ONDANSETRON 4 MG: 2 INJECTION INTRAMUSCULAR; INTRAVENOUS at 12:47

## 2017-10-06 RX ADMIN — FAMOTIDINE 20 MG: 20 TABLET, FILM COATED ORAL at 21:05

## 2017-10-06 RX ADMIN — GLYCOPYRROLATE 0.5 MG: 0.2 INJECTION, SOLUTION INTRAMUSCULAR; INTRAVENOUS at 13:06

## 2017-10-06 ASSESSMENT — PULMONARY FUNCTION TESTS
PIF_VALUE: 23
PIF_VALUE: 16
PIF_VALUE: 25
PIF_VALUE: 16
PIF_VALUE: 24
PIF_VALUE: 16
PIF_VALUE: 22
PIF_VALUE: 25
PIF_VALUE: 22
PIF_VALUE: 1
PIF_VALUE: 20
PIF_VALUE: 16
PIF_VALUE: 26
PIF_VALUE: 25
PIF_VALUE: 0
PIF_VALUE: 25
PIF_VALUE: 16
PIF_VALUE: 17
PIF_VALUE: 27
PIF_VALUE: 24
PIF_VALUE: 2
PIF_VALUE: 24
PIF_VALUE: 20
PIF_VALUE: 26
PIF_VALUE: 24
PIF_VALUE: 24
PIF_VALUE: 23
PIF_VALUE: 24
PIF_VALUE: 26
PIF_VALUE: 24
PIF_VALUE: 19
PIF_VALUE: 26
PIF_VALUE: 24
PIF_VALUE: 24
PIF_VALUE: 25
PIF_VALUE: 24
PIF_VALUE: 25
PIF_VALUE: 25
PIF_VALUE: 24
PIF_VALUE: 26
PIF_VALUE: 0
PIF_VALUE: 0
PIF_VALUE: 23
PIF_VALUE: 20
PIF_VALUE: 22
PIF_VALUE: 19
PIF_VALUE: 25
PIF_VALUE: 15
PIF_VALUE: 5
PIF_VALUE: 25
PIF_VALUE: 24
PIF_VALUE: 0
PIF_VALUE: 0
PIF_VALUE: 27
PIF_VALUE: 27
PIF_VALUE: 15
PIF_VALUE: 25
PIF_VALUE: 24
PIF_VALUE: 0
PIF_VALUE: 20
PIF_VALUE: 7
PIF_VALUE: 22
PIF_VALUE: 16
PIF_VALUE: 0
PIF_VALUE: 1
PIF_VALUE: 25
PIF_VALUE: 25
PIF_VALUE: 15
PIF_VALUE: 18
PIF_VALUE: 24
PIF_VALUE: 23
PIF_VALUE: 0
PIF_VALUE: 25
PIF_VALUE: 23
PIF_VALUE: 15
PIF_VALUE: 24
PIF_VALUE: 0
PIF_VALUE: 3
PIF_VALUE: 18
PIF_VALUE: 16
PIF_VALUE: 16
PIF_VALUE: 1
PIF_VALUE: 25
PIF_VALUE: 24
PIF_VALUE: 24
PIF_VALUE: 15
PIF_VALUE: 25
PIF_VALUE: 26
PIF_VALUE: 25
PIF_VALUE: 0
PIF_VALUE: 23
PIF_VALUE: 23
PIF_VALUE: 1
PIF_VALUE: 25
PIF_VALUE: 22
PIF_VALUE: 0
PIF_VALUE: 24
PIF_VALUE: 0
PIF_VALUE: 24
PIF_VALUE: 26
PIF_VALUE: 16
PIF_VALUE: 25
PIF_VALUE: 0
PIF_VALUE: 15
PIF_VALUE: 24
PIF_VALUE: 27
PIF_VALUE: 20
PIF_VALUE: 24
PIF_VALUE: 19
PIF_VALUE: 3
PIF_VALUE: 23
PIF_VALUE: 25
PIF_VALUE: 22
PIF_VALUE: 24
PIF_VALUE: 23
PIF_VALUE: 15
PIF_VALUE: 16
PIF_VALUE: 15
PIF_VALUE: 23
PIF_VALUE: 16
PIF_VALUE: 24
PIF_VALUE: 25
PIF_VALUE: 27
PIF_VALUE: 26
PIF_VALUE: 16
PIF_VALUE: 26
PIF_VALUE: 0
PIF_VALUE: 16
PIF_VALUE: 16
PIF_VALUE: 26
PIF_VALUE: 14
PIF_VALUE: 18
PIF_VALUE: 0
PIF_VALUE: 23
PIF_VALUE: 25
PIF_VALUE: 5
PIF_VALUE: 0
PIF_VALUE: 22
PIF_VALUE: 0
PIF_VALUE: 25
PIF_VALUE: 0
PIF_VALUE: 0
PIF_VALUE: 24
PIF_VALUE: 0
PIF_VALUE: 20
PIF_VALUE: 19
PIF_VALUE: 1
PIF_VALUE: 0
PIF_VALUE: 2
PIF_VALUE: 27

## 2017-10-06 ASSESSMENT — PAIN SCALES - GENERAL
PAINLEVEL_OUTOF10: 0
PAINLEVEL_OUTOF10: 3
PAINLEVEL_OUTOF10: 2
PAINLEVEL_OUTOF10: 0
PAINLEVEL_OUTOF10: 3

## 2017-10-06 ASSESSMENT — PAIN DESCRIPTION - ORIENTATION: ORIENTATION: LOWER

## 2017-10-06 ASSESSMENT — PAIN DESCRIPTION - PAIN TYPE: TYPE: SURGICAL PAIN

## 2017-10-06 ASSESSMENT — PAIN DESCRIPTION - LOCATION: LOCATION: ABDOMEN

## 2017-10-06 NOTE — PROGRESS NOTES
1328 ARRIVED IN PACU, SEE FLOW SHEET . UNRESPONSIVE AND OBSTRUCTING NP AIRWAY PER AND AND THEN ORAL AIRWAY PER BETO HART . PT. HAD REPORTEDLY GOTTEN VERY RESTLESS AND COMBATIVE WHEN AWAKENING IN O R AND FENTANYL GIVEN. [address] AWAKENED TO PAINFUL STIMULI SUDDENLY AND RESTLESS AND DISORIENTED. ORAL AIRWAY REMOVED  1355 REASSURED AND CALMING DOWN. SLOW DEEP BREATHS ENCOURAGED  1405 SAYS JUST FEELS TIRED  1410 AWAKE AND ALERT AND ORIENTED BUT SLEEPY WITH MOANING   1415 REQUESTS AN ICE CHIP NOW AND GIVEN. NP AIRWAY REMOVED  1430 MORE AWAKE NOW. CALM AND PAIN CONTROLLED AND TOLERABLE. 1445 REASSESSED SEE [address] [employer] NOTIFIED  1450 REPORT CALLED.  WAITING FOR TRANSPORT  1502 TO ROOM PER TRANSPORT WITH O2 IN STABLE CONDITION

## 2017-10-06 NOTE — PROGRESS NOTES
Patient admitted to [facility] room 6. Fall and allergy bands placed. Ld teds and scds on. Patient and family oriented to room and call light.  Pain goal after surgery is 5/10

## 2017-10-06 NOTE — BRIEF OP NOTE
Brief Postoperative Note    Pedro Smith  YOB: 1950  [id number]    Pre-operative Diagnosis: Prostate cancer    Post-operative Diagnosis: Same    Procedure: Robotic Assisted Laparoscopic Radical Prostatectomy with Bilateral Pelvic Lymph Node Dissection    Anesthesia: General    Surgeons/Assistants: Shruthi Boyle MD    Estimated Blood Loss: 613 ml    Complications: None    Specimens: Prostate, SV, Vas deferens, Bilateral Pelvic Lymph Nodes    Findings: See dictated operative note    Electronically signed by Elda Morocho PA-C on 10/6/2017 at 1:51 PM

## 2017-10-07 VITALS
RESPIRATION RATE: 22 BRPM | HEIGHT: 72 IN | WEIGHT: 204.4 LBS | BODY MASS INDEX: 27.68 KG/M2 | DIASTOLIC BLOOD PRESSURE: 57 MMHG | SYSTOLIC BLOOD PRESSURE: 107 MMHG | TEMPERATURE: 98.8 F | HEART RATE: 98 BPM | OXYGEN SATURATION: 91 %

## 2017-10-07 LAB
ANION GAP SERPL CALCULATED.3IONS-SCNC: 12 MEQ/L (ref 8–16)
BASOPHILS # BLD: 0.6 %
BASOPHILS ABSOLUTE: 0.1 THOU/MM3 (ref 0–0.1)
BUN BLDV-MCNC: 14 MG/DL (ref 7–22)
CALCIUM SERPL-MCNC: 7.8 MG/DL (ref 8.5–10.5)
CHLORIDE BLD-SCNC: 105 MEQ/L (ref 98–111)
CO2: 24 MEQ/L (ref 23–33)
CREAT SERPL-MCNC: 1.1 MG/DL (ref 0.4–1.2)
EOSINOPHIL # BLD: 0.1 %
EOSINOPHILS ABSOLUTE: 0 THOU/MM3 (ref 0–0.4)
GFR SERPL CREATININE-BSD FRML MDRD: 67 ML/MIN/1.73M2
GLUCOSE BLD-MCNC: 104 MG/DL (ref 70–108)
HCT VFR BLD CALC: 39.9 % (ref 42–52)
HEMOGLOBIN: 13.5 GM/DL (ref 14–18)
LYMPHOCYTES # BLD: 7.2 %
LYMPHOCYTES ABSOLUTE: 1.1 THOU/MM3 (ref 1–4.8)
MCH RBC QN AUTO: 31.4 PG (ref 27–31)
MCHC RBC AUTO-ENTMCNC: 34 GM/DL (ref 33–37)
MCV RBC AUTO: 92.6 FL (ref 80–94)
MONOCYTES # BLD: 10.9 %
MONOCYTES ABSOLUTE: 1.6 THOU/MM3 (ref 0.4–1.3)
NUCLEATED RED BLOOD CELLS: 0 /100 WBC
PDW BLD-RTO: 14.5 % (ref 11.5–14.5)
PLATELET # BLD: 187 THOU/MM3 (ref 130–400)
PMV BLD AUTO: 10.4 MCM (ref 7.4–10.4)
POTASSIUM SERPL-SCNC: 4.2 MEQ/L (ref 3.5–5.2)
RBC # BLD: 4.31 MILL/MM3 (ref 4.7–6.1)
RBC # BLD: NORMAL 10*6/UL
SEG NEUTROPHILS: 81.2 %
SEGMENTED NEUTROPHILS ABSOLUTE COUNT: 12.3 THOU/MM3 (ref 1.8–7.7)
SODIUM BLD-SCNC: 141 MEQ/L (ref 135–145)
WBC # BLD: 15.1 THOU/MM3 (ref 4.8–10.8)

## 2017-10-07 PROCEDURE — 85025 COMPLETE CBC W/AUTO DIFF WBC: CPT

## 2017-10-07 PROCEDURE — 2580000003 HC RX 258: Performed by: UROLOGY

## 2017-10-07 PROCEDURE — 6370000000 HC RX 637 (ALT 250 FOR IP): Performed by: UROLOGY

## 2017-10-07 PROCEDURE — 99024 POSTOP FOLLOW-UP VISIT: CPT | Performed by: UROLOGY

## 2017-10-07 PROCEDURE — 36415 COLL VENOUS BLD VENIPUNCTURE: CPT

## 2017-10-07 PROCEDURE — 6370000000 HC RX 637 (ALT 250 FOR IP): Performed by: PHYSICIAN ASSISTANT

## 2017-10-07 PROCEDURE — 6360000002 HC RX W HCPCS: Performed by: PHYSICIAN ASSISTANT

## 2017-10-07 PROCEDURE — 80048 BASIC METABOLIC PNL TOTAL CA: CPT

## 2017-10-07 PROCEDURE — 2500000003 HC RX 250 WO HCPCS: Performed by: PHYSICIAN ASSISTANT

## 2017-10-07 RX ORDER — DOCUSATE SODIUM 100 MG/1
100 CAPSULE, LIQUID FILLED ORAL DAILY PRN
Qty: 10 CAPSULE | Refills: 0 | Status: SHIPPED | OUTPATIENT
Start: 2017-10-07 | End: 2017-10-18

## 2017-10-07 RX ORDER — HYDROCODONE BITARTRATE AND ACETAMINOPHEN 5; 325 MG/1; MG/1
1 TABLET ORAL EVERY 6 HOURS PRN
Qty: 20 TABLET | Refills: 0 | Status: SHIPPED | OUTPATIENT
Start: 2017-10-07 | End: 2017-10-14

## 2017-10-07 RX ADMIN — CLINDAMYCIN PHOSPHATE 900 MG: 18 INJECTION, SOLUTION INTRAMUSCULAR; INTRAVENOUS at 00:06

## 2017-10-07 RX ADMIN — FAMOTIDINE 20 MG: 20 TABLET, FILM COATED ORAL at 08:24

## 2017-10-07 RX ADMIN — ALLOPURINOL 100 MG: 100 TABLET ORAL at 08:25

## 2017-10-07 RX ADMIN — LISINOPRIL 10 MG: 10 TABLET ORAL at 08:25

## 2017-10-07 RX ADMIN — HYDROCHLOROTHIAZIDE 12.5 MG: 12.5 CAPSULE ORAL at 08:25

## 2017-10-07 RX ADMIN — SODIUM CHLORIDE: 9 INJECTION, SOLUTION INTRAVENOUS at 03:31

## 2017-10-07 RX ADMIN — DOCUSATE SODIUM 100 MG: 100 CAPSULE ORAL at 08:24

## 2017-10-07 RX ADMIN — BACITRACIN, NEOMYCIN, POLYMYXIN B 3.5 G: 400; 3.5; 5 OINTMENT TOPICAL at 08:24

## 2017-10-07 RX ADMIN — ENOXAPARIN SODIUM 40 MG: 40 INJECTION SUBCUTANEOUS at 08:24

## 2017-10-07 RX ADMIN — MAGNESIUM HYDROXIDE 30 ML: 400 SUSPENSION ORAL at 08:24

## 2017-10-07 ASSESSMENT — PAIN DESCRIPTION - LOCATION
LOCATION: ABDOMEN

## 2017-10-07 ASSESSMENT — PAIN DESCRIPTION - DESCRIPTORS
DESCRIPTORS: ACHING
DESCRIPTORS: ACHING

## 2017-10-07 ASSESSMENT — PAIN DESCRIPTION - ORIENTATION
ORIENTATION: LOWER
ORIENTATION: LOWER

## 2017-10-07 ASSESSMENT — PAIN DESCRIPTION - PAIN TYPE
TYPE: SURGICAL PAIN

## 2017-10-07 ASSESSMENT — PAIN SCALES - GENERAL
PAINLEVEL_OUTOF10: 3

## 2017-10-07 NOTE — PLAN OF CARE
Problem: Safety:  Goal: Free from accidental physical injury  Free from accidental physical injury   Outcome: Ongoing  No falls or injuries. Problem: Pain:  Goal: Patients pain/discomfort is manageable  Patients pain/discomfort is manageable   Outcome: Ongoing  Pain goal is met. No medication needed. Problem: Discharge Planning:  Goal: Patients continuum of care needs are met  Patients continuum of care needs are met   Outcome: Ongoing  Will be discharged home with wife. Problem: Cardiovascular  Goal: No DVT, peripheral vascular complications  Outcome: Ongoing  SCDs on. No pain or swelling in legs. Problem:   Goal: Adequate urinary output  Outcome: Ongoing  Escobedo care done. Urine output greater than 30 ml per hour. Comments:   Pt aware of plan of care, continuing to update and work with patient to address needs and expectations.

## 2017-10-07 NOTE — DISCHARGE SUMMARY
Patient Identification  Farnaz Alonzo is a 79 y.o. male. :  1950  Admit Date:  10/6/2017    Discharge date: 10/07/17                                    Disposition: home    Discharge Diagnoses:   Patient Active Problem List   Diagnosis    Elevated PSA       Consults: none    Surgery: Robotic Assisted Laparoscopic Radical Prostatectomy with Bilateral Pelvic Lymph Node Dissection    Patient Instructions: Activity: no heavy lifting, pushing, pulling for 6 weeks, no driving while on analgesics. Diet: As tolerated  Follow-up with Dr Isa Boone in 1 week. Hospital course: Patient under went Robotic Assisted Laparoscopic Radical Prostatectomy with Bilateral Pelvic Lymph Node Dissection with no complications. Post-operatively he remained stable. Patient is tolerating diet, urinating adequately on gaspar, pain is minimal, ambulating well with assistance.       Time spent for discharge 10 minutes

## 2017-10-07 NOTE — FLOWSHEET NOTE
Dr Starr Art notified patient is not passing flatus. Stated could still be discharged as long as abdomen remains soft and does not have nausea.

## 2017-10-07 NOTE — PLAN OF CARE
Problem: Safety:  Goal: Free from accidental physical injury  Free from accidental physical injury   Outcome: Ongoing  Remains free from harm. Nonskid  socks on when up. Call light in reach and able to make needs known    Problem: Pain:  Goal: Patient's pain/discomfort is manageable  Patient's pain/discomfort is manageable   Outcome: Ongoing  Rates pain a 3 on scale of 0-10. Pain goal of a 3 is being met. Refuses offer of pain medication    Problem: Discharge Planning:  Goal: Patients continuum of care needs are met  Patients continuum of care needs are met   Outcome: Ongoing  Plans to go home at discharge with support of family at home. No anticipated needs voiced    Problem: Cardiovascular  Goal: No DVT, peripheral vascular complications  Outcome: Ongoing  No evidence of dvt. No c/o lower leg pain. No edema noted in lower legs    Problem:   Goal: Adequate urinary output  Outcome: Ongoing  Escobedo catheter patent draining clear flower urine    Comments: Care plan reviewed with patient. Patient verbalize understanding of the plan of care and contribute to goal setting.

## 2017-10-07 NOTE — FLOWSHEET NOTE
Patient stated he had passed flatus. Abdomen remains soft. Bowel sounds active. Patient and wife taught gaspra care and how to change straight drain bag to leg bag. Instructed to always keep catheter bag below bladder and voiced understanding on how empty both drainage bags. Discharge teaching and instructions given. Patient voiced understanding regarding prescriptions, follow up appointments, and care of self at home.  Discharged to home by family car

## 2017-10-08 NOTE — OP NOTE
Sergey Bearden 60  RECORD OF OPERATION     PATIENT NAME: Gabriella Boston               MEDICAL RECORD NO. [id number]                DATE OF PROCEDURE: 10/06/2017  SURGEON: Ainsley Youssef MD  PRIMARY CARE PHYSICIAN: Netta Xie        PREOPERATIVE DIAGNOSIS: Prostate cancer      POSTOPERATIVE DIAGNOSIS: Prostate cancer      PROCEDURE PERFORMED: Robot-Assisted Laparoscopic Radical Prostatectomy (RALRP) and Bilateral pelvic lymph node dissection     SURGEON: Ainsley Youssef MD    ASSISTANT(S): Gloria Bailey     ANESTHESIA: General     BLOOD LOSS:  100 cc     SPECIMENS: Prostate and seminal vesicles, Bilateral pelvic lymph nodes     COMPLICATIONS:  None immediately appreciated. DISCUSSION:  Ace Delgado is a 79y.o.-year-old male who has a diagnosis of Prostate cancer, [place] grade 7 and PSA of 6.54. After a history and physical examination was performed, potential diagnostic and therapeutic modalities were discussed with the patient. RALRP was recommended and a discussion of the risks, possible complications, possible side effects, along with the anticipated benefits were reviewed. We also discussed those things we would be doing pre-operatively, intra-operatively and post-operatively to minimize these side effects. He was given the opportunity to ask questions. Once answered, informed consent was obtained. He was brought to the operating on 10/06/2017 for the procedure. DESCRIPTION OF PROCEDURE: The patient was brought to the Operating Room and placed supine on the operating room table. After initiation of anesthesia, he was positioned on the beanbag and placed in a lithotomy position. He was well-padded and secured to the table with the beanbag and then his abdomen was clipped and cleaned. He was prepped and draped in the standard fashion for surgery. We began by obtaining pneumoperitoneum through a small, transverse, supraumbilical incision.  Once we had pneumoperitoneum up to 20 cm of dorsal vein was then sutured ligated after clearing both sides of the endopelvic fascia. A 0 Vicryl on a CT 1 needle was used to place a figure-of-eight around the dorsal vein and this was then tied down with first a surgeon's knot and then 2 additional throws. The needle was cut and removed. The bladder neck was then incised anteriorly. Bladder neck was identified first by examining the prostate carefully and by gently pulling on the Escobedo catheter balloon. The bladder was elevated to provide circumferential stretch in the bladder neck and to help identify it. The anterior bladder neck was cut all the way down to the Escobedo catheter which was then visualized. The balloon was deflated and the catheter pulled back and then passed into the abdomen and elevated using the fourth arm. The catheter was placed on some stretch exposing the posterior bladder neck. The posterior bladder neck was then dissected off of the prostate with care taken to make sure that we were erring toward the bladder side. This was taken all the way down underneath the prostate to the Denonvilliers fascia layers. These were opened and the vas deferens found on both sides. The posterior dissection was performed freeing up the vas on both sides approximately 3-4 cm and then cutting these. These were elevated up exposing the underlying seminal vesicles which were also dissected free and elevated. With the prostate elevated up, a good space was made between the prostate and the rectum going between the 2 layers of Denonvilliers fascia. This was taken up as high as possible toward the apex of the prostate. At this point, all that remained were the lateral pedicles and the neurovascular bundles and then, the distal attachments. The pedicles were secured by making pillars and placing clips on the pedicles. They were then cut coldly and any small blood vessels were carefully cauterized using bipolar cautery.  The neurovascular bundle first

## 2017-10-16 ENCOUNTER — TELEPHONE (OUTPATIENT)
Dept: UROLOGY | Age: 67
End: 2017-10-16

## 2017-10-16 RX ORDER — OXYBUTYNIN CHLORIDE 5 MG/1
5 TABLET ORAL 3 TIMES DAILY PRN
Qty: 30 TABLET | Refills: 1 | Status: SHIPPED | OUTPATIENT
Start: 2017-10-16 | End: 2018-06-22

## 2017-10-16 NOTE — TELEPHONE ENCOUNTER
Patients wife called (on [employer]) and stated that her  just started leaking around his catheter yesterday. I asked if he has been having bladder spasms and she stated that he said yes. He is currently not on any bladder spasm medicine. Appointment with Jay Sandy CNP on 10/18/17. Pharmacy [facility] in [facility]. Please advise. Thank you.

## 2017-10-18 ENCOUNTER — OFFICE VISIT (OUTPATIENT)
Dept: UROLOGY | Age: 67
End: 2017-10-18

## 2017-10-18 VITALS
HEIGHT: 72 IN | WEIGHT: 205 LBS | DIASTOLIC BLOOD PRESSURE: 68 MMHG | BODY MASS INDEX: 27.77 KG/M2 | SYSTOLIC BLOOD PRESSURE: 122 MMHG

## 2017-10-18 DIAGNOSIS — C61 PROSTATE CANCER (HCC): Primary | ICD-10-CM

## 2017-10-18 PROCEDURE — 99024 POSTOP FOLLOW-UP VISIT: CPT | Performed by: NURSE PRACTITIONER

## 2017-10-18 NOTE — PROGRESS NOTES
Kelly Gupta is a 79 y.o. male was seen in follow up for prostate cancer. He  underwent Robotic-Assisted Laparoscopic Radical Prostatectomy (RALRP) and bilateral pelvic lymph node dissection 10/6/2017. Final pathology resulted pT2cN0, margins negative, Gleasons 3+4, perineural invasion. He is doing well. He was noting gross hematuria for a day and this resolved. He states he is ambulating well, appetite is well, bowel movements occurring. Pain is minimal, catheter is more discomforting than anything. He also denies night sweats, fatigue, malaise, nausea, vomiting, chest pain, SOB, fever, chills. Past Medical History:   Diagnosis Date    Arthritis     Cancer [facility]) 2017    prostate cancer 8-2017    Elevated PSA     Gout     Hypertension        Past Surgical History:   Procedure Laterality Date    APPENDECTOMY  1970's    COLONOSCOPY  2009    OTHER SURGICAL HISTORY  2013    mole removal    PROSTATECTOMY N/A 10/6/2017    ROBOTIC RADICAL PROSTATECTOMY, BILATERAL PELVIC LYMPH NODE DISSECTION performed by Fernando Edwards MD at [facility]  [id number]       Current Outpatient Prescriptions on File Prior to Visit   Medication Sig Dispense Refill    oxybutynin (DITROPAN) 5 MG tablet Take 1 tablet by mouth 3 times daily as needed (bladder spasms) 30 tablet 1    docusate sodium (COLACE) 100 MG capsule Take 1 capsule by mouth daily as needed for Constipation 10 capsule 0    lisinopril-hydrochlorothiazide (PRINZIDE;ZESTORETIC) 10-12.5 MG per tablet Take 1 tablet by mouth daily      diphenhydrAMINE (BENADRYL) 25 MG tablet Take 25 mg by mouth every 8 hours as needed for Itching or Allergies      meloxicam (MOBIC) 15 MG tablet Take 15 mg by mouth daily as needed for Pain      Glucosamine 500 MG CAPS Take by mouth      allopurinol (ZYLOPRIM) 100 MG tablet Take 100 mg by mouth 2 times daily       B Complex Vitamins (VITAMIN B COMPLEX PO) Take 1 tablet by mouth daily.        No current II-XII are grossly intact. Skin:       Skin color, texture, turgor normal. No rashes or lesions. Psychiatric:        Normal mood and affect. Labs   Urine dip demonstrates   No results found for this visit on 10/18/17. Surgical Pathology  FINAL DIAGNOSIS:  A.  Prostate, radical prostatectomy:   Invasive prostatic adenocarcinoma, Darron score 3+4 = 7, pT2c, pN0.   ~11% of prostate involved by tumor.   Perineural invasion present.   No seminal vesicle invasion or extraprostatic extension.   Margins free of neoplasia. B.  Lymph node (1), right pelvic, resection:   Negative for malignancy (0/1). C.  Lymph nodes (2), left pelvic, resection:   Negative for malignancy (0/2). Assessment & Plan  Prostate Cancer    Pathology was reviewed with patient, no further treatment warranted at this time, we will monitor his PSA closely, every 3 months over the next year. Escobedo catheter and staples removed today    Encouraged to start kegel exercises today    Ok to drive if not taking narcotic pain medicine. No heavy lifting, pushing, pulling greater than 10 pounds.     Follow-up 2 months with PSA

## 2017-11-24 LAB
ALBUMIN SERPL-MCNC: 3.9 G/DL
ALP BLD-CCNC: 54 U/L
ALT SERPL-CCNC: 36 U/L
ANION GAP SERPL CALCULATED.3IONS-SCNC: 14 MMOL/L
AST SERPL-CCNC: 23 U/L
BASOPHILS ABSOLUTE: 0.1 /ΜL
BASOPHILS RELATIVE PERCENT: 0.8 %
BILIRUB SERPL-MCNC: 0.5 MG/DL (ref 0.1–1.4)
BUN BLDV-MCNC: 13 MG/DL
CALCIUM SERPL-MCNC: 9.1 MG/DL
CHLORIDE BLD-SCNC: 107 MMOL/L
CO2: 29 MMOL/L
CREAT SERPL-MCNC: 1.16 MG/DL
EOSINOPHILS ABSOLUTE: 0.4 /ΜL
EOSINOPHILS RELATIVE PERCENT: 4.7 %
GFR CALCULATED: NORMAL
GLUCOSE BLD-MCNC: 87 MG/DL
HCT VFR BLD CALC: 45.1 % (ref 41–53)
HEMOGLOBIN: 14.9 G/DL (ref 13.5–17.5)
LYMPHOCYTES ABSOLUTE: 2 /ΜL
LYMPHOCYTES RELATIVE PERCENT: 23.7 %
MCH RBC QN AUTO: 30.5 PG
MCHC RBC AUTO-ENTMCNC: 33 G/DL
MCV RBC AUTO: 92.4 FL
MONOCYTES ABSOLUTE: 0.8 /ΜL
MONOCYTES RELATIVE PERCENT: 9.2 %
NEUTROPHILS ABSOLUTE: 5.2 /ΜL
NEUTROPHILS RELATIVE PERCENT: 61.6 %
PDW BLD-RTO: 14.7 %
PLATELET # BLD: 240 K/ΜL
PMV BLD AUTO: 10 FL
POTASSIUM SERPL-SCNC: 4 MMOL/L
PROSTATE SPECIFIC ANTIGEN: NORMAL NG/ML
RBC # BLD: 4.88 10^6/ΜL
SODIUM BLD-SCNC: 146 MMOL/L
TOTAL PROTEIN: 6.9
WBC # BLD: 8.4 10^3/ML

## 2017-12-05 ENCOUNTER — OFFICE VISIT (OUTPATIENT)
Dept: UROLOGY | Age: 67
End: 2017-12-05
Payer: MEDICARE

## 2017-12-05 VITALS — WEIGHT: 200 LBS | HEIGHT: 72 IN | BODY MASS INDEX: 27.09 KG/M2

## 2017-12-05 DIAGNOSIS — C61 PROSTATE CANCER (HCC): Primary | ICD-10-CM

## 2017-12-05 PROCEDURE — G8417 CALC BMI ABV UP PARAM F/U: HCPCS | Performed by: NURSE PRACTITIONER

## 2017-12-05 PROCEDURE — 3017F COLORECTAL CA SCREEN DOC REV: CPT | Performed by: NURSE PRACTITIONER

## 2017-12-05 PROCEDURE — G8482 FLU IMMUNIZE ORDER/ADMIN: HCPCS | Performed by: NURSE PRACTITIONER

## 2017-12-05 PROCEDURE — 81002 URINALYSIS NONAUTO W/O SCOPE: CPT | Performed by: NURSE PRACTITIONER

## 2017-12-05 PROCEDURE — G8428 CUR MEDS NOT DOCUMENT: HCPCS | Performed by: NURSE PRACTITIONER

## 2017-12-05 PROCEDURE — 4004F PT TOBACCO SCREEN RCVD TLK: CPT | Performed by: NURSE PRACTITIONER

## 2017-12-05 PROCEDURE — 1123F ACP DISCUSS/DSCN MKR DOCD: CPT | Performed by: NURSE PRACTITIONER

## 2017-12-05 PROCEDURE — 99024 POSTOP FOLLOW-UP VISIT: CPT | Performed by: NURSE PRACTITIONER

## 2017-12-05 PROCEDURE — 4040F PNEUMOC VAC/ADMIN/RCVD: CPT | Performed by: NURSE PRACTITIONER

## 2017-12-05 NOTE — PROGRESS NOTES
Substance Use Topics    Smoking status: Current Every Day Smoker     Packs/day: 1.00     Years: 44.00     Types: Cigarettes    Smokeless tobacco: Never Used    Alcohol use Yes      Comment: 1-2 BEERS DAILY       No family history on file. Review of Systems  No problems with ears, nose or throat. No problems with eyes. No chest pain, shortness of breath, abdominal pain, extremity pain or weakness, and no neurological deficits. No rashes. No swollen glands or lymph nodes.  symptoms per HPI. The remainder of the review of symptoms is negative. Exam  Vitals:    12/05/17 1020   Weight: 200 lb (90.7 kg)   Height: 6' (1.829 m)     Nursing note and vitals reviewed. Constitutional: Alert and oriented times 3, no acute distress and cooperative to examination with appropriate mood and affect. HENT:   Head:        Normocephalic and atraumatic. Mouth/Throat:         Mucous membranes are normal.   Eyes:         EOM are normal. No scleral icterus. PERRLA. Neck:        Supple, symmetrical, trachea midline, no adenopathy, thyroid symmetric, not enlarged and no tenderness. Cardiovascular:        Normal rate, regular rhythm, S1 S2 heart sounds. No murmurs, rub, or gallops. Pulmonary/Chest:      Chest symmetric with normal A/P diameter,  CTA with no wheezes, rales, or rhonchi noted. Normal respiratory rate and rhthym. No use of accessory muscles. Abdominal:         Soft. No tenderness. No rebound, no guarding and no CVA tenderness. Bowel sounds present. Psychiatric:        Normal mood and affect.     Labs   Urine dip demonstrates   Results for POC orders placed in visit on 12/05/17   POCT Urinalysis no Micro   Result Value Ref Range    Color, UA      Clarity, UA      Glucose, UA POC      Bilirubin, UA      Ketones, UA      Spec Grav, UA      Blood, UA POC small     pH, UA      Protein, UA POC      Urobilinogen, UA      Leukocytes, UA neg     Nitrite, UA neg      Lab Results   Component Value Date

## 2018-06-22 ENCOUNTER — APPOINTMENT (OUTPATIENT)
Dept: GENERAL RADIOLOGY | Age: 68
End: 2018-06-22
Payer: MEDICARE

## 2018-06-22 ENCOUNTER — HOSPITAL ENCOUNTER (EMERGENCY)
Age: 68
Discharge: HOME | End: 2018-06-22
Attending: EMERGENCY MEDICINE
Payer: MEDICARE

## 2018-06-22 VITALS
OXYGEN SATURATION: 95 % | DIASTOLIC BLOOD PRESSURE: 85 MMHG | HEART RATE: 104 BPM | BODY MASS INDEX: 27.09 KG/M2 | HEIGHT: 72 IN | SYSTOLIC BLOOD PRESSURE: 140 MMHG | WEIGHT: 200 LBS | RESPIRATION RATE: 20 BRPM | TEMPERATURE: 98.7 F

## 2018-06-22 DIAGNOSIS — J44.1 COPD WITH ACUTE EXACERBATION (HCC): Primary | ICD-10-CM

## 2018-06-22 PROCEDURE — 94640 AIRWAY INHALATION TREATMENT: CPT

## 2018-06-22 PROCEDURE — 71046 X-RAY EXAM CHEST 2 VIEWS: CPT

## 2018-06-22 PROCEDURE — 6370000000 HC RX 637 (ALT 250 FOR IP): Performed by: EMERGENCY MEDICINE

## 2018-06-22 PROCEDURE — 99285 EMERGENCY DEPT VISIT HI MDM: CPT

## 2018-06-22 RX ORDER — ALBUTEROL SULFATE 90 UG/1
2 AEROSOL, METERED RESPIRATORY (INHALATION) EVERY 4 HOURS PRN
Qty: 1 INHALER | Refills: 0 | Status: SHIPPED | OUTPATIENT
Start: 2018-06-22 | End: 2020-01-06 | Stop reason: SDUPTHER

## 2018-06-22 RX ORDER — ALBUTEROL SULFATE 90 UG/1
2 AEROSOL, METERED RESPIRATORY (INHALATION) EVERY 4 HOURS PRN
Status: DISCONTINUED | OUTPATIENT
Start: 2018-06-22 | End: 2018-06-22 | Stop reason: HOSPADM

## 2018-06-22 RX ORDER — AZITHROMYCIN 250 MG/1
500 TABLET, FILM COATED ORAL ONCE
Status: COMPLETED | OUTPATIENT
Start: 2018-06-22 | End: 2018-06-22

## 2018-06-22 RX ORDER — PREDNISONE 20 MG/1
60 TABLET ORAL ONCE
Status: COMPLETED | OUTPATIENT
Start: 2018-06-22 | End: 2018-06-22

## 2018-06-22 RX ORDER — AZITHROMYCIN 250 MG/1
TABLET, FILM COATED ORAL
Qty: 4 TABLET | Refills: 0 | Status: SHIPPED | OUTPATIENT
Start: 2018-06-22 | End: 2018-06-26

## 2018-06-22 RX ORDER — IBUPROFEN 800 MG/1
800 TABLET ORAL ONCE
Status: DISCONTINUED | OUTPATIENT
Start: 2018-06-22 | End: 2018-06-22

## 2018-06-22 RX ORDER — PREDNISONE 20 MG/1
TABLET ORAL
Qty: 12 TABLET | Refills: 0 | Status: SHIPPED | OUTPATIENT
Start: 2018-06-22 | End: 2020-01-06 | Stop reason: ALTCHOICE

## 2018-06-22 RX ORDER — IPRATROPIUM BROMIDE AND ALBUTEROL SULFATE 2.5; .5 MG/3ML; MG/3ML
1 SOLUTION RESPIRATORY (INHALATION) ONCE
Status: COMPLETED | OUTPATIENT
Start: 2018-06-22 | End: 2018-06-22

## 2018-06-22 RX ADMIN — AZITHROMYCIN 500 MG: 250 TABLET, FILM COATED ORAL at 02:18

## 2018-06-22 RX ADMIN — PREDNISONE 60 MG: 20 TABLET ORAL at 02:19

## 2018-06-22 RX ADMIN — IPRATROPIUM BROMIDE AND ALBUTEROL SULFATE 1 AMPULE: .5; 3 SOLUTION RESPIRATORY (INHALATION) at 01:27

## 2018-06-22 ASSESSMENT — ENCOUNTER SYMPTOMS
ABDOMINAL PAIN: 0
SHORTNESS OF BREATH: 1
SORE THROAT: 0
COUGH: 1
WHEEZING: 1
NAUSEA: 0

## 2020-01-06 ENCOUNTER — APPOINTMENT (OUTPATIENT)
Dept: GENERAL RADIOLOGY | Age: 70
End: 2020-01-06
Payer: MEDICARE

## 2020-01-06 ENCOUNTER — HOSPITAL ENCOUNTER (EMERGENCY)
Age: 70
Discharge: HOME | End: 2020-01-06
Attending: EMERGENCY MEDICINE
Payer: MEDICARE

## 2020-01-06 ENCOUNTER — APPOINTMENT (OUTPATIENT)
Dept: CT IMAGING | Age: 70
End: 2020-01-06
Payer: MEDICARE

## 2020-01-06 VITALS
SYSTOLIC BLOOD PRESSURE: 136 MMHG | WEIGHT: 200 LBS | BODY MASS INDEX: 27.09 KG/M2 | HEART RATE: 99 BPM | HEIGHT: 72 IN | OXYGEN SATURATION: 90 % | DIASTOLIC BLOOD PRESSURE: 80 MMHG | RESPIRATION RATE: 20 BRPM | TEMPERATURE: 97.5 F

## 2020-01-06 LAB
ALBUMIN SERPL-MCNC: 3.8 GM/DL (ref 3.4–5)
ALP BLD-CCNC: 59 U/L (ref 46–116)
ALT SERPL-CCNC: 70 U/L (ref 14–63)
ANION GAP: 13 MEQ/L (ref 8–16)
AST SERPL-CCNC: 30 U/L (ref 15–37)
BASOPHILS # BLD: 0.6 % (ref 0–3)
BILIRUB SERPL-MCNC: 0.4 MG/DL (ref 0.2–1)
BUN BLDV-MCNC: 17 MG/DL (ref 7–18)
CHLORIDE BLD-SCNC: 104 MEQ/L (ref 98–107)
CO2: 25 MEQ/L (ref 21–32)
CREAT SERPL-MCNC: 1.2 MG/DL (ref 0.6–1.3)
EKG ATRIAL RATE: 98 BPM
EKG P AXIS: 74 DEGREES
EKG P-R INTERVAL: 140 MS
EKG Q-T INTERVAL: 362 MS
EKG QRS DURATION: 96 MS
EKG QTC CALCULATION (BAZETT): 462 MS
EKG R AXIS: 79 DEGREES
EKG T AXIS: 77 DEGREES
EKG VENTRICULAR RATE: 98 BPM
EOSINOPHILS RELATIVE PERCENT: 6.7 % (ref 0–4)
FLU A ANTIGEN: NEGATIVE
FLU B ANTIGEN: NEGATIVE
GFR, ESTIMATED: 64 ML/MIN/1.73M2
GLUCOSE BLD-MCNC: 113 MG/DL (ref 74–106)
HCT VFR BLD CALC: 46 % (ref 42–52)
HEMOGLOBIN: 15 GM/DL (ref 14–18)
LYMPHOCYTES # BLD: 16.8 % (ref 15–47)
MAGNESIUM: 1.8 MG/DL (ref 1.8–2.4)
MCH RBC QN AUTO: 31 PG (ref 27–31)
MCHC RBC AUTO-ENTMCNC: 32.6 GM/DL (ref 33–37)
MCV RBC AUTO: 94.9 FL (ref 80–94)
MONOCYTES: 7.6 % (ref 0–12)
NT PRO BNP: 41 PG/ML (ref 0–900)
PDW BLD-RTO: 14.2 % (ref 11.5–14.5)
PLATELET # BLD: 247 THOU/MM3 (ref 130–400)
PMV BLD AUTO: 9 FL (ref 7.4–10.4)
POC CALCIUM: 8.8 MG/DL (ref 8.5–10.1)
POTASSIUM SERPL-SCNC: 3.8 MEQ/L (ref 3.5–5.1)
RBC # BLD: 4.85 MILL/MM3 (ref 4.7–6.1)
SCAN OF BLOOD SMEAR: NORMAL
SEGS: 68.3 % (ref 43–75)
SODIUM BLD-SCNC: 142 MEQ/L (ref 136–145)
TOTAL PROTEIN: 7.2 GM/DL (ref 6.4–8.2)
TROPONIN I: < 0.017 NG/ML (ref 0.02–0.05)
WBC # BLD: 14.7 THOU/MM3 (ref 4.8–10.8)

## 2020-01-06 PROCEDURE — 71046 X-RAY EXAM CHEST 2 VIEWS: CPT

## 2020-01-06 PROCEDURE — 84484 ASSAY OF TROPONIN QUANT: CPT

## 2020-01-06 PROCEDURE — 80053 COMPREHEN METABOLIC PANEL: CPT

## 2020-01-06 PROCEDURE — 36415 COLL VENOUS BLD VENIPUNCTURE: CPT

## 2020-01-06 PROCEDURE — 93010 ELECTROCARDIOGRAM REPORT: CPT | Performed by: INTERNAL MEDICINE

## 2020-01-06 PROCEDURE — 6360000002 HC RX W HCPCS: Performed by: EMERGENCY MEDICINE

## 2020-01-06 PROCEDURE — 83735 ASSAY OF MAGNESIUM: CPT

## 2020-01-06 PROCEDURE — 87804 INFLUENZA ASSAY W/OPTIC: CPT

## 2020-01-06 PROCEDURE — 71275 CT ANGIOGRAPHY CHEST: CPT

## 2020-01-06 PROCEDURE — 83880 ASSAY OF NATRIURETIC PEPTIDE: CPT

## 2020-01-06 PROCEDURE — 85025 COMPLETE CBC W/AUTO DIFF WBC: CPT

## 2020-01-06 PROCEDURE — 6370000000 HC RX 637 (ALT 250 FOR IP): Performed by: EMERGENCY MEDICINE

## 2020-01-06 PROCEDURE — 93005 ELECTROCARDIOGRAM TRACING: CPT | Performed by: EMERGENCY MEDICINE

## 2020-01-06 PROCEDURE — 6360000004 HC RX CONTRAST MEDICATION: Performed by: EMERGENCY MEDICINE

## 2020-01-06 PROCEDURE — 96374 THER/PROPH/DIAG INJ IV PUSH: CPT

## 2020-01-06 PROCEDURE — 2709999900 HC NON-CHARGEABLE SUPPLY

## 2020-01-06 PROCEDURE — 99285 EMERGENCY DEPT VISIT HI MDM: CPT

## 2020-01-06 RX ORDER — PREDNISONE 20 MG/1
TABLET ORAL
Qty: 12 TABLET | Refills: 0 | Status: ON HOLD | OUTPATIENT
Start: 2020-01-06 | End: 2020-02-26 | Stop reason: ALTCHOICE

## 2020-01-06 RX ORDER — IPRATROPIUM BROMIDE AND ALBUTEROL SULFATE 2.5; .5 MG/3ML; MG/3ML
1 SOLUTION RESPIRATORY (INHALATION) ONCE
Status: COMPLETED | OUTPATIENT
Start: 2020-01-06 | End: 2020-01-06

## 2020-01-06 RX ORDER — ALBUTEROL SULFATE 2.5 MG/3ML
2.5 SOLUTION RESPIRATORY (INHALATION) EVERY 4 HOURS PRN
Qty: 1 PACKAGE | Refills: 0 | Status: SHIPPED | OUTPATIENT
Start: 2020-01-06 | End: 2022-09-20

## 2020-01-06 RX ORDER — METHYLPREDNISOLONE SODIUM SUCCINATE 125 MG/2ML
125 INJECTION, POWDER, LYOPHILIZED, FOR SOLUTION INTRAMUSCULAR; INTRAVENOUS ONCE
Status: COMPLETED | OUTPATIENT
Start: 2020-01-06 | End: 2020-01-06

## 2020-01-06 RX ORDER — AMOXICILLIN 500 MG/1
500 CAPSULE ORAL 3 TIMES DAILY
Status: ON HOLD | COMMUNITY
End: 2020-02-26 | Stop reason: ALTCHOICE

## 2020-01-06 RX ORDER — ATORVASTATIN CALCIUM 40 MG/1
40 TABLET, FILM COATED ORAL DAILY
COMMUNITY

## 2020-01-06 RX ADMIN — IPRATROPIUM BROMIDE AND ALBUTEROL SULFATE 1 AMPULE: .5; 3 SOLUTION RESPIRATORY (INHALATION) at 04:11

## 2020-01-06 RX ADMIN — IOPAMIDOL 100 ML: 755 INJECTION, SOLUTION INTRAVENOUS at 05:57

## 2020-01-06 RX ADMIN — METHYLPREDNISOLONE SODIUM SUCCINATE 125 MG: 125 INJECTION, POWDER, FOR SOLUTION INTRAMUSCULAR; INTRAVENOUS at 05:29

## 2020-01-06 RX ADMIN — IPRATROPIUM BROMIDE AND ALBUTEROL SULFATE 1 AMPULE: .5; 3 SOLUTION RESPIRATORY (INHALATION) at 05:29

## 2020-01-06 ASSESSMENT — ENCOUNTER SYMPTOMS
WHEEZING: 1
ABDOMINAL PAIN: 0
SPUTUM PRODUCTION: 0
NAUSEA: 0
SHORTNESS OF BREATH: 1
SORE THROAT: 0
COUGH: 1

## 2020-01-06 NOTE — ED PROVIDER NOTES
[facility]  eMERGENCY dEPARTMENT eNCOUnter             Misael Sharpe 19 COMPLAINT    Chief Complaint   Patient presents with    Shortness of Breath       Nurses Notes reviewed and I agree except as noted in the HPI. HPI    Wili Fregoso is a 71 y.o. male who presents via private vehicle stating that he has had cough and congestion with shortness of breath and wheezing for 2 weeks, much worse in the last 2 days. He arrives visibly dyspneic and wheezing. He states that he has been using his inhaler, and his doctor put him on some antibiotics, but he is just not getting better. He thinks that maybe it is because his insurance company recently made him switch from Brilinta to Plavix as his antiplatelet therapy. He denies any chest pain or palpitations. He is not coughing up any sputum. REVIEW OF SYSTEMS      Review of Systems   Constitutional: Positive for malaise/fatigue. Negative for diaphoresis and fever. HENT: Positive for congestion. Negative for ear pain and sore throat. Respiratory: Positive for cough, shortness of breath and wheezing. Negative for sputum production. Cardiovascular: Negative for chest pain, palpitations and leg swelling. Gastrointestinal: Negative for abdominal pain and nausea. Skin: Negative for rash. Neurological: Negative for dizziness and headaches. All other systems reviewed and are negative. PAST MEDICAL HISTORY     has a past medical history of Arthritis, Cancer ([address] Elevated PSA, Gout, and Hypertension. SURGICAL HISTORY     has a past surgical history that includes other surgical history (2013); Tonsillectomy (1950's); Appendectomy (1970's); Colonoscopy (2009); and Prostatectomy (N/A, 10/6/2017).     CURRENT MEDICATIONS    Previous Medications    ALLOPURINOL (ZYLOPRIM) 100 MG TABLET    Take 100 mg by mouth 2 times daily     AMOXICILLIN (AMOXIL) 500 MG CAPSULE    Take 500 mg by mouth 3 times daily ATORVASTATIN (LIPITOR) 40 MG TABLET    Take 40 mg by mouth daily    B COMPLEX VITAMINS (VITAMIN B COMPLEX PO)    Take 1 tablet by mouth daily. CLOPIDOGREL BISULFATE (PLAVIX PO)    Take 75 mg by mouth    DIPHENHYDRAMINE (BENADRYL) 25 MG TABLET    Take 25 mg by mouth every 8 hours as needed for Itching or Allergies    GLUCOSAMINE 500 MG CAPS    Take by mouth    LISINOPRIL-HYDROCHLOROTHIAZIDE (PRINZIDE;ZESTORETIC) 10-12.5 MG PER TABLET    Take 1 tablet by mouth daily    METOPROLOL TARTRATE (LOPRESSOR) 25 MG TABLET    Take 25 mg by mouth 2 times daily       ALLERGIES    is allergic to cephalosporins and tetracyclines & related. FAMILY HISTORY    He indicated that his mother is alive. He indicated that his father is . family history is not on file. SOCIAL HISTORY     reports that he has been smoking cigarettes. He has a 44.00 pack-year smoking history. He has never used smokeless tobacco. He reports current alcohol use. He reports that he does not use drugs. PHYSICAL EXAM       INITIAL VITALS: /80   Pulse 103   Temp 97.5 °F (36.4 °C) (Oral)   Resp 21   Ht 6' (1.829 m)   Wt 200 lb (90.7 kg)   SpO2 90%   BMI 27.12 kg/m²      Physical Exam  Vitals signs and nursing note reviewed. Exam conducted with a chaperone present. Constitutional:       General: He is in acute distress. Appearance: He is not toxic-appearing. HENT:      Right Ear: Tympanic membrane normal.      Left Ear: Tympanic membrane normal.      Nose: Nose normal. No congestion or rhinorrhea. Mouth/Throat:      Mouth: Mucous membranes are moist.      Pharynx: Oropharynx is clear. No oropharyngeal exudate or posterior oropharyngeal erythema. Eyes:      Conjunctiva/sclera: Conjunctivae normal.      Pupils: Pupils are equal, round, and reactive to light. Neck:      Musculoskeletal: Neck supple. Cardiovascular:      Rate and Rhythm: Normal rate and regular rhythm. Heart sounds: Murmur present.    Pulmonary: ALT 70 (*)     All other components within normal limits   GLOMERULAR FILTRATION RATE, ESTIMATED - Abnormal; Notable for the following components:    GFR, Estimated 64 (*)     All other components within normal limits   RAPID INFLUENZA A/B ANTIGENS   TROPONIN   BRAIN NATRIURETIC PEPTIDE   MAGNESIUM   ANION GAP   SCAN OF BLOOD SMEAR       Vitals:    Vitals:    01/06/20 0532 01/06/20 0540 01/06/20 0638 01/06/20 0642   BP: 116/74   136/80   Pulse: 91  103 103   Resp: 10 16 21    Temp:    97.5 °F (36.4 °C)   TempSrc:    Oral   SpO2: 97% 95% (!) 89% 90%   Weight:       Height:           EMERGENCY DEPARTMENT COURSE:    His nurse placed him on oxygen before I saw him. This helped him some. He received DuoNeb x2, IV fluids, IV Solu-Medrol. He feels and sounds better. He walked in the hallway on O2, and SaO2 dropped to 89%, but he did not feel dyspneic. Test results and plan of care discussed. He will go home with nebs and steroids and follow up with his doctor ASAP. FINAL IMPRESSION      1. COPD exacerbation [facility])        DISPOSITION/PLAN    DISPOSITION Decision To Discharge 01/06/2020 06:49:55 AM      PATIENT REFERRED TO:    Jethro Toro  [address] [address]  [phone]    Schedule an appointment as soon as possible for a visit         DISCHARGE MEDICATIONS:    New Prescriptions    ALBUTEROL (PROVENTIL) (2.5 MG/3ML) 0.083% NEBULIZER SOLUTION    Take 3 mLs by nebulization every 4 hours as needed for Wheezing    PREDNISONE (DELTASONE) 20 MG TABLET    3 daily for 2 days, then 2 daily for 2 days, then 1 daily for 2 days for inflammation and wheezing.     RESPIRATORY THERAPY SUPPLIES (NEBULIZER COMPRESSOR) KIT    1 kit by Does not apply route once for 1 dose          (Please note that portions of this note were completed with a voice recognition program.  Efforts were made to edit the dictations but occasionally words are mis-transcribed.)      Becca Argueta MD  01/06/20 [address] [place] [address]

## 2020-01-06 NOTE — ED NOTES
influenza collected. Patient observed, resp easy, breathing treatment finished. Patient reported, \"feeling better. \"      Koen Alicia, MIKY  01/06/20 0890

## 2020-01-06 NOTE — ED TRIAGE NOTES
Patient reports, 'cough for 2  Days started on antibiotic. \" patient declined w/c. Patient ambulated to the room. Observed patient difficulty in breathing. Placed in the bed on monitor, oxygen applied 2 liters NC. EKG done. Patient first pulse ox on Room air 87-89%, Patient denied chest pain.

## 2020-01-08 ASSESSMENT — ENCOUNTER SYMPTOMS
COUGH: 1
WHEEZING: 1
NAUSEA: 0
ABDOMINAL PAIN: 0
SHORTNESS OF BREATH: 1
SPUTUM PRODUCTION: 0
SORE THROAT: 0

## 2020-02-24 ENCOUNTER — APPOINTMENT (OUTPATIENT)
Dept: GENERAL RADIOLOGY | Age: 70
DRG: 208 | End: 2020-02-24
Payer: MEDICARE

## 2020-02-24 ENCOUNTER — HOSPITAL ENCOUNTER (INPATIENT)
Age: 70
LOS: 3 days | Discharge: HOME | DRG: 208 | End: 2020-02-27
Attending: EMERGENCY MEDICINE | Admitting: INTERNAL MEDICINE
Payer: MEDICARE

## 2020-02-24 PROBLEM — J96.00 ACUTE RESPIRATORY FAILURE (HCC): Status: ACTIVE | Noted: 2020-02-24

## 2020-02-24 LAB
ALBUMIN SERPL-MCNC: 3.7 GM/DL (ref 3.4–5)
ALP BLD-CCNC: 54 U/L (ref 46–116)
ALT SERPL-CCNC: 49 U/L (ref 14–63)
ANION GAP: 12 MEQ/L (ref 8–16)
AST SERPL-CCNC: 26 U/L (ref 15–37)
ATYPICAL LYMPHOCYTES: ABNORMAL %
BASE EXCESS (CALCULATED): -3 MMOL/L (ref -2.5–2.5)
BASOPHILS # BLD: 0.8 %
BASOPHILS ABSOLUTE: 0.1 THOU/MM3 (ref 0–0.1)
BILIRUB SERPL-MCNC: 0.6 MG/DL (ref 0.2–1)
BUN BLDV-MCNC: 23 MG/DL (ref 7–18)
CHLORIDE BLD-SCNC: 100 MEQ/L (ref 98–107)
CO2: 27 MEQ/L (ref 21–32)
CREAT SERPL-MCNC: 1.7 MG/DL (ref 0.6–1.3)
DIFFERENTIAL TYPE: ABNORMAL
EKG ATRIAL RATE: 129 BPM
EKG P AXIS: 92 DEGREES
EKG P-R INTERVAL: 130 MS
EKG Q-T INTERVAL: 330 MS
EKG QRS DURATION: 96 MS
EKG QTC CALCULATION (BAZETT): 479 MS
EKG R AXIS: 90 DEGREES
EKG T AXIS: 67 DEGREES
EKG VENTRICULAR RATE: 127 BPM
EOSINOPHIL # BLD: 16.2 %
EOSINOPHILS ABSOLUTE: 2.5 THOU/MM3 (ref 0–0.4)
FIO2: ABNORMAL %
FLU A ANTIGEN: NEGATIVE
FLU B ANTIGEN: NEGATIVE
GFR, ESTIMATED: 43 ML/MIN/1.73M2
GLUCOSE BLD-MCNC: 163 MG/DL (ref 74–106)
HCO3: 24 MMOL/L (ref 23–28)
HCT VFR BLD CALC: 43.9 % (ref 42–52)
HEMOGLOBIN: 14.1 GM/DL (ref 14–18)
IMMATURE GRANS (ABS): 0.07 THOU/MM3 (ref 0–0.07)
IMMATURE GRANULOCYTES: 0.5 %
LYMPHOCYTES # BLD: 18 %
LYMPHOCYTES ABSOLUTE: 2.8 THOU/MM3 (ref 1–4.8)
MAGNESIUM: 1.7 MG/DL (ref 1.8–2.4)
MAGNESIUM: 2.2 MG/DL (ref 1.6–2.4)
MCH RBC QN AUTO: 30.5 PG (ref 27–31)
MCHC RBC AUTO-ENTMCNC: 32.1 GM/DL (ref 33–37)
MCV RBC AUTO: 95 FL (ref 80–94)
MISC REFERENCE: NORMAL
MONOCYTES # BLD: 8.6 %
MONOCYTES ABSOLUTE: 1.3 THOU/MM3 (ref 0.4–1.3)
MRSA SCREEN RT-PCR: NEGATIVE
NT PRO BNP: 66 PG/ML (ref 0–900)
NUCLEATED RED BLOOD CELLS: 0 /100 WBC
O2 SATURATION: 94 % (ref 94–97)
PATHOLOGIST REVIEW: ABNORMAL
PCO2: 52 MMHG (ref 35–45)
PDW BLD-RTO: 14.2 % (ref 11.5–14.5)
PH BLOOD GAS: 7.28 (ref 7.35–7.45)
PLATELET # BLD: 245 THOU/MM3 (ref 130–400)
PMV BLD AUTO: 8.6 FL (ref 7.4–10.4)
PO2: 82 MMHG (ref 71–104)
POC CALCIUM: 8.8 MG/DL (ref 8.5–10.1)
POTASSIUM SERPL-SCNC: 3.7 MEQ/L (ref 3.5–5.1)
PROCALCITONIN: 0.06 NG/ML (ref 0.01–0.09)
RBC # BLD: 4.62 MILL/MM3 (ref 4.7–6.1)
SCAN OF BLOOD SMEAR: NORMAL
SEG NEUTROPHILS: 55.9 %
SEGMENTED NEUTROPHILS ABSOLUTE COUNT: 8.7 THOU/MM3 (ref 1.8–7.7)
SODIUM BLD-SCNC: 139 MEQ/L (ref 136–145)
TOTAL PROTEIN: 7.7 GM/DL (ref 6.4–8.2)
TROPONIN I: < 0.017 NG/ML (ref 0.02–0.05)
TROPONIN T: < 0.01 NG/ML
VANCOMYCIN RESISTANT ENTEROCOCCUS: NEGATIVE
WBC # BLD: 15.4 THOU/MM3 (ref 4.8–10.8)

## 2020-02-24 PROCEDURE — 94002 VENT MGMT INPAT INIT DAY: CPT

## 2020-02-24 PROCEDURE — 6370000000 HC RX 637 (ALT 250 FOR IP): Performed by: NURSE PRACTITIONER

## 2020-02-24 PROCEDURE — 36415 COLL VENOUS BLD VENIPUNCTURE: CPT

## 2020-02-24 PROCEDURE — 2709999900 HC NON-CHARGEABLE SUPPLY

## 2020-02-24 PROCEDURE — 94761 N-INVAS EAR/PLS OXIMETRY MLT: CPT

## 2020-02-24 PROCEDURE — 84484 ASSAY OF TROPONIN QUANT: CPT

## 2020-02-24 PROCEDURE — 99284 EMERGENCY DEPT VISIT MOD MDM: CPT

## 2020-02-24 PROCEDURE — 71045 X-RAY EXAM CHEST 1 VIEW: CPT

## 2020-02-24 PROCEDURE — 82803 BLOOD GASES ANY COMBINATION: CPT

## 2020-02-24 PROCEDURE — 6360000002 HC RX W HCPCS

## 2020-02-24 PROCEDURE — 87070 CULTURE OTHR SPECIMN AEROBIC: CPT

## 2020-02-24 PROCEDURE — 0099U HC RESPIRPTHGN MULT REV TRANS & AMP PRB TECH 20 TRGT: CPT

## 2020-02-24 PROCEDURE — 0BH17EZ INSERTION OF ENDOTRACHEAL AIRWAY INTO TRACHEA, VIA NATURAL OR ARTIFICIAL OPENING: ICD-10-PCS | Performed by: INTERNAL MEDICINE

## 2020-02-24 PROCEDURE — 87641 MR-STAPH DNA AMP PROBE: CPT

## 2020-02-24 PROCEDURE — 6360000002 HC RX W HCPCS: Performed by: INTERNAL MEDICINE

## 2020-02-24 PROCEDURE — 87804 INFLUENZA ASSAY W/OPTIC: CPT

## 2020-02-24 PROCEDURE — 83735 ASSAY OF MAGNESIUM: CPT

## 2020-02-24 PROCEDURE — 84145 PROCALCITONIN (PCT): CPT

## 2020-02-24 PROCEDURE — 94770 HC ETCO2 MONITOR DAILY: CPT

## 2020-02-24 PROCEDURE — C9113 INJ PANTOPRAZOLE SODIUM, VIA: HCPCS | Performed by: NURSE PRACTITIONER

## 2020-02-24 PROCEDURE — 87081 CULTURE SCREEN ONLY: CPT

## 2020-02-24 PROCEDURE — 89220 SPUTUM SPECIMEN COLLECTION: CPT

## 2020-02-24 PROCEDURE — 5A1945Z RESPIRATORY VENTILATION, 24-96 CONSECUTIVE HOURS: ICD-10-PCS | Performed by: INTERNAL MEDICINE

## 2020-02-24 PROCEDURE — 85025 COMPLETE CBC W/AUTO DIFF WBC: CPT

## 2020-02-24 PROCEDURE — 2700000000 HC OXYGEN THERAPY PER DAY

## 2020-02-24 PROCEDURE — 96361 HYDRATE IV INFUSION ADD-ON: CPT

## 2020-02-24 PROCEDURE — APPSS60 APP SPLIT SHARED TIME 46-60 MINUTES: Performed by: NURSE PRACTITIONER

## 2020-02-24 PROCEDURE — 87086 URINE CULTURE/COLONY COUNT: CPT

## 2020-02-24 PROCEDURE — 2580000003 HC RX 258: Performed by: EMERGENCY MEDICINE

## 2020-02-24 PROCEDURE — 6360000002 HC RX W HCPCS: Performed by: EMERGENCY MEDICINE

## 2020-02-24 PROCEDURE — 83880 ASSAY OF NATRIURETIC PEPTIDE: CPT

## 2020-02-24 PROCEDURE — 6360000002 HC RX W HCPCS: Performed by: NURSE PRACTITIONER

## 2020-02-24 PROCEDURE — 6370000000 HC RX 637 (ALT 250 FOR IP)

## 2020-02-24 PROCEDURE — 93005 ELECTROCARDIOGRAM TRACING: CPT | Performed by: EMERGENCY MEDICINE

## 2020-02-24 PROCEDURE — 80053 COMPREHEN METABOLIC PANEL: CPT

## 2020-02-24 PROCEDURE — 2500000003 HC RX 250 WO HCPCS: Performed by: EMERGENCY MEDICINE

## 2020-02-24 PROCEDURE — 94640 AIRWAY INHALATION TREATMENT: CPT

## 2020-02-24 PROCEDURE — 2580000003 HC RX 258: Performed by: NURSE PRACTITIONER

## 2020-02-24 PROCEDURE — 96375 TX/PRO/DX INJ NEW DRUG ADDON: CPT

## 2020-02-24 PROCEDURE — 87205 SMEAR GRAM STAIN: CPT

## 2020-02-24 PROCEDURE — 31500 INSERT EMERGENCY AIRWAY: CPT

## 2020-02-24 PROCEDURE — 99223 1ST HOSP IP/OBS HIGH 75: CPT | Performed by: INTERNAL MEDICINE

## 2020-02-24 PROCEDURE — 93010 ELECTROCARDIOGRAM REPORT: CPT | Performed by: INTERNAL MEDICINE

## 2020-02-24 PROCEDURE — 87500 VANOMYCIN DNA AMP PROBE: CPT

## 2020-02-24 PROCEDURE — 2000000000 HC ICU R&B

## 2020-02-24 PROCEDURE — 96374 THER/PROPH/DIAG INJ IV PUSH: CPT

## 2020-02-24 RX ORDER — PROPOFOL 10 MG/ML
10 INJECTION, EMULSION INTRAVENOUS
Status: DISCONTINUED | OUTPATIENT
Start: 2020-02-24 | End: 2020-02-25

## 2020-02-24 RX ORDER — ALBUTEROL SULFATE 2.5 MG/3ML
10 SOLUTION RESPIRATORY (INHALATION) ONCE
Status: COMPLETED | OUTPATIENT
Start: 2020-02-24 | End: 2020-02-24

## 2020-02-24 RX ORDER — 0.9 % SODIUM CHLORIDE 0.9 %
500 INTRAVENOUS SOLUTION INTRAVENOUS ONCE
Status: COMPLETED | OUTPATIENT
Start: 2020-02-24 | End: 2020-02-24

## 2020-02-24 RX ORDER — MIDAZOLAM HYDROCHLORIDE 1 MG/ML
INJECTION INTRAMUSCULAR; INTRAVENOUS DAILY PRN
Status: DISCONTINUED | OUTPATIENT
Start: 2020-02-24 | End: 2020-02-24

## 2020-02-24 RX ORDER — METHYLPREDNISOLONE SODIUM SUCCINATE 125 MG/2ML
125 INJECTION, POWDER, LYOPHILIZED, FOR SOLUTION INTRAMUSCULAR; INTRAVENOUS ONCE
Status: COMPLETED | OUTPATIENT
Start: 2020-02-24 | End: 2020-02-24

## 2020-02-24 RX ORDER — ALBUTEROL SULFATE 2.5 MG/3ML
SOLUTION RESPIRATORY (INHALATION)
Status: DISCONTINUED
Start: 2020-02-24 | End: 2020-02-24

## 2020-02-24 RX ORDER — METHYLPREDNISOLONE SODIUM SUCCINATE 40 MG/ML
40 INJECTION, POWDER, LYOPHILIZED, FOR SOLUTION INTRAMUSCULAR; INTRAVENOUS DAILY
Status: DISCONTINUED | OUTPATIENT
Start: 2020-02-25 | End: 2020-02-26

## 2020-02-24 RX ORDER — SODIUM CHLORIDE 9 MG/ML
INJECTION, SOLUTION INTRAVENOUS CONTINUOUS
Status: DISCONTINUED | OUTPATIENT
Start: 2020-02-24 | End: 2020-02-26

## 2020-02-24 RX ORDER — MAGNESIUM SULFATE HEPTAHYDRATE 500 MG/ML
INJECTION, SOLUTION INTRAMUSCULAR; INTRAVENOUS
Status: COMPLETED
Start: 2020-02-24 | End: 2020-02-24

## 2020-02-24 RX ORDER — IPRATROPIUM BROMIDE AND ALBUTEROL SULFATE 2.5; .5 MG/3ML; MG/3ML
1 SOLUTION RESPIRATORY (INHALATION) ONCE
Status: DISCONTINUED | OUTPATIENT
Start: 2020-02-24 | End: 2020-02-24

## 2020-02-24 RX ORDER — LORAZEPAM 2 MG/ML
INJECTION INTRAMUSCULAR
Status: COMPLETED
Start: 2020-02-24 | End: 2020-02-24

## 2020-02-24 RX ORDER — PANTOPRAZOLE SODIUM 40 MG/10ML
40 INJECTION, POWDER, LYOPHILIZED, FOR SOLUTION INTRAVENOUS DAILY
Status: DISCONTINUED | OUTPATIENT
Start: 2020-02-24 | End: 2020-02-26

## 2020-02-24 RX ORDER — IPRATROPIUM BROMIDE AND ALBUTEROL SULFATE 2.5; .5 MG/3ML; MG/3ML
SOLUTION RESPIRATORY (INHALATION)
Status: COMPLETED
Start: 2020-02-24 | End: 2020-02-24

## 2020-02-24 RX ORDER — MIDAZOLAM HYDROCHLORIDE 5 MG/ML
INJECTION INTRAMUSCULAR; INTRAVENOUS
Status: DISCONTINUED
Start: 2020-02-24 | End: 2020-02-24

## 2020-02-24 RX ORDER — PROPOFOL 10 MG/ML
INJECTION, EMULSION INTRAVENOUS DAILY PRN
Status: DISCONTINUED | OUTPATIENT
Start: 2020-02-24 | End: 2020-02-24

## 2020-02-24 RX ORDER — ETOMIDATE 2 MG/ML
INJECTION INTRAVENOUS DAILY PRN
Status: DISCONTINUED | OUTPATIENT
Start: 2020-02-24 | End: 2020-02-24

## 2020-02-24 RX ORDER — IPRATROPIUM BROMIDE AND ALBUTEROL SULFATE 2.5; .5 MG/3ML; MG/3ML
1 SOLUTION RESPIRATORY (INHALATION) ONCE
Status: COMPLETED | OUTPATIENT
Start: 2020-02-24 | End: 2020-02-24

## 2020-02-24 RX ORDER — PROPOFOL 10 MG/ML
INJECTION, EMULSION INTRAVENOUS
Status: DISCONTINUED
Start: 2020-02-24 | End: 2020-02-24

## 2020-02-24 RX ORDER — 0.9 % SODIUM CHLORIDE 0.9 %
1000 INTRAVENOUS SOLUTION INTRAVENOUS ONCE
Status: COMPLETED | OUTPATIENT
Start: 2020-02-24 | End: 2020-02-24

## 2020-02-24 RX ORDER — ALBUTEROL SULFATE 2.5 MG/3ML
2.5 SOLUTION RESPIRATORY (INHALATION) EVERY 4 HOURS
Status: DISCONTINUED | OUTPATIENT
Start: 2020-02-24 | End: 2020-02-24

## 2020-02-24 RX ORDER — CHLORHEXIDINE GLUCONATE 0.12 MG/ML
15 RINSE ORAL 2 TIMES DAILY
Status: DISCONTINUED | OUTPATIENT
Start: 2020-02-24 | End: 2020-02-25

## 2020-02-24 RX ORDER — SUCCINYLCHOLINE CHLORIDE 20 MG/ML
INJECTION INTRAMUSCULAR; INTRAVENOUS DAILY PRN
Status: DISCONTINUED | OUTPATIENT
Start: 2020-02-24 | End: 2020-02-24

## 2020-02-24 RX ORDER — ALBUTEROL SULFATE 2.5 MG/3ML
2.5 SOLUTION RESPIRATORY (INHALATION) EVERY 4 HOURS PRN
Status: DISCONTINUED | OUTPATIENT
Start: 2020-02-24 | End: 2020-02-26

## 2020-02-24 RX ORDER — ATORVASTATIN CALCIUM 40 MG/1
40 TABLET, FILM COATED ORAL DAILY
Status: DISCONTINUED | OUTPATIENT
Start: 2020-02-24 | End: 2020-02-27 | Stop reason: HOSPADM

## 2020-02-24 RX ORDER — CLOPIDOGREL BISULFATE 75 MG/1
75 TABLET ORAL DAILY
Status: DISCONTINUED | OUTPATIENT
Start: 2020-02-24 | End: 2020-02-27 | Stop reason: HOSPADM

## 2020-02-24 RX ORDER — PROPOFOL 10 MG/ML
INJECTION, EMULSION INTRAVENOUS
Status: DISPENSED
Start: 2020-02-24 | End: 2020-02-24

## 2020-02-24 RX ORDER — ALBUTEROL SULFATE 2.5 MG/3ML
2.5 SOLUTION RESPIRATORY (INHALATION)
Status: DISCONTINUED | OUTPATIENT
Start: 2020-02-24 | End: 2020-02-24

## 2020-02-24 RX ADMIN — ENOXAPARIN SODIUM 40 MG: 40 INJECTION SUBCUTANEOUS at 12:53

## 2020-02-24 RX ADMIN — PROPOFOL 50 MCG/KG/MIN: 10 INJECTION, EMULSION INTRAVENOUS at 17:12

## 2020-02-24 RX ADMIN — IPRATROPIUM BROMIDE AND ALBUTEROL SULFATE 3 ML: .5; 3 SOLUTION RESPIRATORY (INHALATION) at 05:48

## 2020-02-24 RX ADMIN — CLOPIDOGREL BISULFATE 75 MG: 75 TABLET ORAL at 10:27

## 2020-02-24 RX ADMIN — GLYCOPYRROLATE AND FORMOTEROL FUMARATE 2 PUFF: 9; 4.8 AEROSOL, METERED RESPIRATORY (INHALATION) at 21:11

## 2020-02-24 RX ADMIN — PROPOFOL 100 MG: 10 INJECTION, EMULSION INTRAVENOUS at 06:50

## 2020-02-24 RX ADMIN — SUCCINYLCHOLINE CHLORIDE 200 MG: 20 INJECTION, SOLUTION INTRAMUSCULAR; INTRAVENOUS at 06:53

## 2020-02-24 RX ADMIN — Medication 15 ML: at 20:25

## 2020-02-24 RX ADMIN — ALBUTEROL SULFATE 10 MG: 2.5 SOLUTION RESPIRATORY (INHALATION) at 08:06

## 2020-02-24 RX ADMIN — ATORVASTATIN CALCIUM 40 MG: 40 TABLET, FILM COATED ORAL at 10:27

## 2020-02-24 RX ADMIN — SODIUM CHLORIDE 1000 ML: 9 INJECTION, SOLUTION INTRAVENOUS at 08:10

## 2020-02-24 RX ADMIN — MIDAZOLAM 14 MG: 1 INJECTION INTRAMUSCULAR; INTRAVENOUS at 06:52

## 2020-02-24 RX ADMIN — SODIUM CHLORIDE 500 ML: 9 INJECTION, SOLUTION INTRAVENOUS at 05:46

## 2020-02-24 RX ADMIN — PROPOFOL 50 MCG/KG/MIN: 10 INJECTION, EMULSION INTRAVENOUS at 23:41

## 2020-02-24 RX ADMIN — GLYCOPYRROLATE AND FORMOTEROL FUMARATE 2 PUFF: 9; 4.8 AEROSOL, METERED RESPIRATORY (INHALATION) at 10:35

## 2020-02-24 RX ADMIN — PROPOFOL 50 MCG/KG/MIN: 10 INJECTION, EMULSION INTRAVENOUS at 15:01

## 2020-02-24 RX ADMIN — AZITHROMYCIN MONOHYDRATE 500 MG: 500 INJECTION, POWDER, LYOPHILIZED, FOR SOLUTION INTRAVENOUS at 09:57

## 2020-02-24 RX ADMIN — IPRATROPIUM BROMIDE AND ALBUTEROL SULFATE 3 ML: 2.5; .5 SOLUTION RESPIRATORY (INHALATION) at 05:48

## 2020-02-24 RX ADMIN — MAGNESIUM SULFATE HEPTAHYDRATE 1 G: 500 INJECTION, SOLUTION INTRAMUSCULAR; INTRAVENOUS at 06:08

## 2020-02-24 RX ADMIN — ETOMIDATE 20 MG: 2 INJECTION INTRAVENOUS at 06:54

## 2020-02-24 RX ADMIN — IPRATROPIUM BROMIDE AND ALBUTEROL SULFATE 3 ML: .5; 3 SOLUTION RESPIRATORY (INHALATION) at 05:30

## 2020-02-24 RX ADMIN — PANTOPRAZOLE SODIUM 40 MG: 40 INJECTION, POWDER, FOR SOLUTION INTRAVENOUS at 12:53

## 2020-02-24 RX ADMIN — PROPOFOL 100 MG: 10 INJECTION, EMULSION INTRAVENOUS at 06:42

## 2020-02-24 RX ADMIN — LORAZEPAM 2 MG: 2 INJECTION INTRAMUSCULAR; INTRAVENOUS at 06:00

## 2020-02-24 RX ADMIN — PROPOFOL 25 MCG/KG/MIN: 10 INJECTION, EMULSION INTRAVENOUS at 07:45

## 2020-02-24 RX ADMIN — PROPOFOL 50 MCG/KG/MIN: 10 INJECTION, EMULSION INTRAVENOUS at 20:24

## 2020-02-24 RX ADMIN — PIPERACILLIN AND TAZOBACTAM 3.38 G: 3; .375 INJECTION, POWDER, LYOPHILIZED, FOR SOLUTION INTRAVENOUS at 12:00

## 2020-02-24 RX ADMIN — IPRATROPIUM BROMIDE AND ALBUTEROL SULFATE 3 ML: 2.5; .5 SOLUTION RESPIRATORY (INHALATION) at 05:30

## 2020-02-24 RX ADMIN — PIPERACILLIN AND TAZOBACTAM 3.38 G: 3; .375 INJECTION, POWDER, LYOPHILIZED, FOR SOLUTION INTRAVENOUS at 18:28

## 2020-02-24 RX ADMIN — ALBUTEROL SULFATE 2.5 MG: 2.5 SOLUTION RESPIRATORY (INHALATION) at 11:32

## 2020-02-24 RX ADMIN — SODIUM CHLORIDE: 9 INJECTION, SOLUTION INTRAVENOUS at 08:10

## 2020-02-24 RX ADMIN — METHYLPREDNISOLONE SODIUM SUCCINATE 125 MG: 125 INJECTION, POWDER, FOR SOLUTION INTRAMUSCULAR; INTRAVENOUS at 05:43

## 2020-02-24 ASSESSMENT — ENCOUNTER SYMPTOMS
COUGH: 1
NAUSEA: 0
WHEEZING: 1
SHORTNESS OF BREATH: 1
SORE THROAT: 0
ABDOMINAL PAIN: 0
VOMITING: 0

## 2020-02-24 ASSESSMENT — PULMONARY FUNCTION TESTS
PIF_VALUE: 22
PIF_VALUE: 22
PIF_VALUE: 18
PIF_VALUE: 20

## 2020-02-24 NOTE — ED NOTES
EMS called over to assist with intubation and transport of patient.       Dolores Torres RN  02/24/20 1019

## 2020-02-24 NOTE — PLAN OF CARE
Nutrition Problem: Inadequate oral intake  Intervention: Food and/or Nutrient Delivery: (recommend begin enteral feeding when able to use gut)  Nutritional Goals: Patient will receive adequate nutrition within 1-4 days.     Problem: Nutrition  Goal: Optimal nutrition therapy  Outcome: Ongoing

## 2020-02-24 NOTE — FLOWSHEET NOTE
02/24/20 1508   Encounter Summary   Services provided to: Patient;Significant other   Referral/Consult From: Nurse   Support System Spouse; Children;[employer] community   Place of [facility] [employer], in [place], [address] [place] [address] Completed  (2/24 no, as Spouse works in the [place] area[place]   Continue Visiting Yes  (2/24 yes for illness adjustment.)   Complexity of Encounter Moderate   Length of Encounter 30 minutes   Grief and Life Adjustment   Type Adjustment to illness   Assessment Unable to respond;Grieving;Coping; Hopeful   Intervention Explored feelings, thoughts, concerns; Discussed illness/injury and it's impact;Prayer   Outcome Did not respond;Engaged in conversation; Shared life review;Grieving     S--patient rested in bed, unresponsive. Spouse is present. They met over 40 years ago in a bar, near [place]. They have two children, a son and a daughter. Spouse was in [address] with their daughter's recovery from hip surgery, when she was notified that he was brought to the [facility]. Friends went to [place] to retrieve her. She is a member of [employer] in [place]. Enmanuel Carmichael is aware of his admission. Patient is in the [address] program.  O--patient appeared comfortable, but unresponsive. His wife is present, caressing his hair, being attentive to him. A--prayers were shared and spiritual needs are currently met. P--spiritual care remains available.

## 2020-02-24 NOTE — ED PROVIDER NOTES
[facility]  eMERGENCY dEPARTMENT eNCOUnter             Misael Sharpe 19 COMPLAINT    Chief Complaint   Patient presents with    Shortness of Breath       Nurses Notes reviewed and I agree except as noted in the HPI. HPI    Angelique Tam is a 71 y.o. male who presents via private vehicle with extreme shortness of breath. He is tripoding her slit breathing, and can barely speak. He's been having problems with his breathing for several weeks, but he just couldn't stand it tonight anymore. Initial O2 saturation is 71%. I saw him 6 or 7 weeks ago with similar symptoms, but not as bad. He does have a history of COPD. He says he has been taking some breathing treatments at home. He denies any chest pain. He does have palpitations. REVIEW OF SYSTEMS      Review of Systems   Constitutional: Positive for malaise/fatigue. Negative for diaphoresis and fever. HENT: Positive for congestion. Negative for sore throat. Respiratory: Positive for cough, shortness of breath and wheezing. Cardiovascular: Positive for palpitations. Negative for chest pain and leg swelling. Gastrointestinal: Negative for abdominal pain, nausea and vomiting. Musculoskeletal: Negative. Skin: Negative for rash. Neurological: Positive for weakness. Negative for focal weakness and headaches. Psychiatric/Behavioral: The patient is nervous/anxious. All other systems reviewed and are negative. PAST MEDICAL HISTORY     has a past medical history of Arthritis, Cancer ([address] Elevated PSA, Gout, and Hypertension. SURGICAL HISTORY     has a past surgical history that includes other surgical history (2013); Tonsillectomy (1950's); Appendectomy (1970's); Colonoscopy (2009); and Prostatectomy (N/A, 10/6/2017).     CURRENT MEDICATIONS    Previous Medications    ALBUTEROL (PROVENTIL) (2.5 MG/3ML) 0.083% NEBULIZER SOLUTION    Take 3 mLs by nebulization every 4 hours as needed for Wheezing    ALLOPURINOL (ZYLOPRIM) 100 MG TABLET    Take 100 mg by mouth 2 times daily     AMOXICILLIN (AMOXIL) 500 MG CAPSULE    Take 500 mg by mouth 3 times daily    ATORVASTATIN (LIPITOR) 40 MG TABLET    Take 40 mg by mouth daily    B COMPLEX VITAMINS (VITAMIN B COMPLEX PO)    Take 1 tablet by mouth daily. CLOPIDOGREL BISULFATE (PLAVIX PO)    Take 75 mg by mouth    DIPHENHYDRAMINE (BENADRYL) 25 MG TABLET    Take 25 mg by mouth every 8 hours as needed for Itching or Allergies    GLUCOSAMINE 500 MG CAPS    Take by mouth    LISINOPRIL-HYDROCHLOROTHIAZIDE (PRINZIDE;ZESTORETIC) 10-12.5 MG PER TABLET    Take 1 tablet by mouth daily    METOPROLOL TARTRATE (LOPRESSOR) 25 MG TABLET    Take 25 mg by mouth 2 times daily    PREDNISONE (DELTASONE) 20 MG TABLET    3 daily for 2 days, then 2 daily for 2 days, then 1 daily for 2 days for inflammation and wheezing. RESPIRATORY THERAPY SUPPLIES (NEBULIZER COMPRESSOR) KIT    1 kit by Does not apply route once for 1 dose       ALLERGIES    is allergic to cephalosporins and tetracyclines & related. FAMILY HISTORY    He indicated that his mother is alive. He indicated that his father is . family history is not on file. SOCIAL HISTORY     reports that he has been smoking cigarettes. He has a 44.00 pack-year smoking history. He has never used smokeless tobacco. He reports current alcohol use. He reports that he does not use drugs. PHYSICAL EXAM       INITIAL VITALS: BP (!) 149/108   Pulse 125   Temp 98.2 °F (36.8 °C)   Resp (!) 58   Ht 6' (1.829 m)   Wt 205 lb (93 kg)   SpO2 (!) 71%   BMI 27.80 kg/m²      Physical Exam  Vitals signs and nursing note reviewed. Exam conducted with a chaperone present. Constitutional:       General: He is in acute distress. Appearance: He is ill-appearing. He is not diaphoretic. HENT:      Head: Atraumatic. Nose: Nose normal. No congestion.       Mouth/Throat:      Mouth: Mucous membranes are moist. Notable for the following components:    Glucose 163 (*)     CREATININE 1.7 (*)     BUN 23 (*)     All other components within normal limits   MAGNESIUM - Abnormal; Notable for the following components:    Magnesium 1.7 (*)     All other components within normal limits   GLOMERULAR FILTRATION RATE, ESTIMATED - Abnormal; Notable for the following components:    GFR, Estimated 43 (*)     All other components within normal limits   BLOOD GAS, ARTERIAL - Abnormal; Notable for the following components:    pH, Blood Gas 7.28 (*)     PCO2 52 (*)     Base Excess (Calculated) -3.0 (*)     All other components within normal limits   TROPONIN   BRAIN NATRIURETIC PEPTIDE   ANION GAP       Vitals:    Vitals:    02/24/20 0533   BP: (!) 149/108   Pulse: 125   Resp: (!) 58   Temp: 98.2 °F (36.8 °C)   SpO2: (!) 71%   Weight: 205 lb (93 kg)   Height: 6' (1.829 m)     Other vitals documented in critical care flow sheet. EMERGENCY DEPARTMENT COURSE:    He was immediately placed on oxygen per nonrebreather mask, and given multiple DuoNeb and then albuterol treatments. He was very anxious, so was given lorazepam 1 mg IV, which helped the anxiety, but he remained very dyspneic. He was given slight Medrol 125 mg IV, and magnesium sulfate 1 g IV. We have is C Pap mask available at this facility we tried that, but that made him so anxious that he ripped it off. At this point, I spoke with him about intubation, and he declined. However, as time went on, he became more dyspneic and family said he wanted to be put to sleep and have a tube put in his throat to help him breathe. Intubation was accomplished with assistance of nursing personnel and paramedics. He was given a time and day 20 mg IV, followed by succinylcholine 200 mg IV. I was able to intubate him with a 7.5 [place] endotracheal tube using the curved Mac blade, intubation successful after 1 try.   Appropriate tube position was confirmed by direct visualization of vocal

## 2020-02-24 NOTE — PLAN OF CARE
oxygenation. Problem: Restraint Use - Nonviolent/Non-Self-Destructive Behavior:  Goal: Absence of restraint indications  Description  Absence of restraint indications  Outcome: Not Met This Shift  Note:   Patient continues to pull at lines and tubes despite redirection.

## 2020-02-24 NOTE — ED NOTES
Diprovan increased to 50 mcg due to patient breathing over ventilations     Cata Smith, RN  02/24/20 6200

## 2020-02-24 NOTE — H&P
 Cancer [facility]) 2017    prostate cancer 8-2017    Elevated PSA     Gout     Hypertension        Past Surgical History:        Procedure Laterality Date    APPENDECTOMY  1970's    COLONOSCOPY  2009    OTHER SURGICAL HISTORY  2013    mole removal    PROSTATECTOMY N/A 10/6/2017    ROBOTIC RADICAL PROSTATECTOMY, BILATERAL PELVIC LYMPH NODE DISSECTION performed by Sarah Mayes MD at [facility]  [id number]       Home Medications:   No current facility-administered medications on file prior to encounter. Current Outpatient Medications on File Prior to Encounter   Medication Sig Dispense Refill    amoxicillin (AMOXIL) 500 MG capsule Take 500 mg by mouth 3 times daily      Clopidogrel Bisulfate (PLAVIX PO) Take 75 mg by mouth      atorvastatin (LIPITOR) 40 MG tablet Take 40 mg by mouth daily      metoprolol tartrate (LOPRESSOR) 25 MG tablet Take 25 mg by mouth 2 times daily      predniSONE (DELTASONE) 20 MG tablet 3 daily for 2 days, then 2 daily for 2 days, then 1 daily for 2 days for inflammation and wheezing. 12 tablet 0    albuterol (PROVENTIL) (2.5 MG/3ML) 0.083% nebulizer solution Take 3 mLs by nebulization every 4 hours as needed for Wheezing 1 Package 0    Respiratory Therapy Supplies (NEBULIZER COMPRESSOR) KIT 1 kit by Does not apply route once for 1 dose 1 kit 0    lisinopril-hydrochlorothiazide (PRINZIDE;ZESTORETIC) 10-12.5 MG per tablet Take 1 tablet by mouth daily      diphenhydrAMINE (BENADRYL) 25 MG tablet Take 25 mg by mouth every 8 hours as needed for Itching or Allergies      Glucosamine 500 MG CAPS Take by mouth      allopurinol (ZYLOPRIM) 100 MG tablet Take 100 mg by mouth 2 times daily       B Complex Vitamins (VITAMIN B COMPLEX PO) Take 1 tablet by mouth daily. Allergies:    Cephalosporins and Tetracyclines & related    Social History:   Reports that he has been smoking cigarettes. He has a 44.00 pack-year smoking history.  He has never used smokeless dyspnea. COMPARISON: Chest x-ray dated 1/6/2020 TECHNIQUE: AP Portable chest xray FINDINGS: Lines/tubes/devices: none Lungs/pleura:  No pneumonia, pulmonary edema, or obvious mass. Lungs are mildly hyperinflated consistent with COPD. No pleural effusion. No pneumothorax. Heart: Heart size is normal. Mediastinum/shayan: No obvious mass or adenopathy. Skeleton: No significant bone or joint abnormality. No acute cardiopulmonary disease. COPD. **This report has been created using voice recognition software. It may contain minor errors which are inherent in voice recognition technology. ** Final report electronically signed by Dr. Abraham Hudson on 2/24/2020 6:05 AM      EKG:  ST-no ischemic changes    CONSULTS:-  [] Cardiology  [] Nephrology  [] Hemo onco   [] GI   [] ID  [] Endocrine  [] Pulmo      [] Neuro    [] Psych   [] Urology  [] ENT   [] [place] [facility]   []Ortho    []CV surg        [] Palliative  [] Hospice [] Pain management   []    []TCU   [x] PT/OT  OTHERS:-    CODE STATUS:-  [x] Full resuscitation [] DNR-Comfort Care-Arrest  [] DNR-Comfort Care   [] Limited Resuscitation   [] No ET intubation   [] No CPR   [] No shock for non-perfusing rhythm    Total critical care time caring for this patient with life threatening, unstable organ failure, including direct patient contact, management of life support systems, review of data including imaging and labs, discussions with other team members and physicians at least 45 min so far today, excluding procedures. Electronically signed by AIDA Mukherjee CNP on 2/24/2020 at 7:49 AM  CRITICAL CARE SPECIALIST. Patient seen by me. Case discussed with nurse practitioner. Patient with COPD exacerbation on mechanical ventilator. Still with copious mucus production. On Zosyn and Zithromax. Awaiting identification of organism with culture and PCR. Electronically signed by Iveth Stokes MD.

## 2020-02-24 NOTE — PROGRESS NOTES
0915 pt restless, agitated. Not following commands. Pulled off gown. Pulled out iv in LAC. pt crossways in bed. Pt does not follow any commands. Pt down to foot of bed. Increased propofol to 35 mcq/kg/min.pressure held at bleeding iv site. Dry dressing applied. Pt repositioned in bed. Linens changed. pt resting.

## 2020-02-24 NOTE — ED NOTES
Report to EMS crew. IV infusing at this time. All fluid rates are unchanged. IV site without redness or tenderness. Pulse regular. Extremities warm. Respirations regular and even. Mucous membranes pink & moist. Alert et oriented x3. No nausea or vomiting. Range of motion within patient's limits. Skin pink, warm & dry. Calm et cooperative. No complaints. Patient denies pain. Transported in guarded condition to [facility] via ambulance.        Maximo Owen RN  02/24/20 3930

## 2020-02-24 NOTE — PROGRESS NOTES
Nutrition Assessment    Type and Reason for Visit: Initial, Consult(tube feeding ordering and management/ recommendations only per CNP)    Nutrition Recommendations: Recommend when able to use gut, Vital 1.2 at 55ml/ hour as goal (1584 kcals (2320 kcals including diprivan kcals), 99 grams protein, 146 grams carbohydrate, 1071ml free water from tube feeding/ 24 hours). Nutrition Assessment:   Pt. nutritionally compromised AEB NPO status, intubated  At risk for further nutrition compromise r/t admit with respiratory failure, COPD acute exacerbation, acute kidney injury and underlying medical condition (prostate cancer, gout). Nutrition recommendations/interventions as per above. Malnutrition Assessment:  · Malnutrition Status: At risk for malnutrition  · Context: Acute illness or injury  · Findings of the 6 clinical characteristics of malnutrition (Minimum of 2 out of 6 clinical characteristics is required to make the diagnosis of moderate or severe Protein Calorie Malnutrition based on AND/ASPEN Guidelines):  1. Energy Intake-Less than or equal to 75% of estimated energy requirement, (2-3 days prior to admit)    2. Weight Loss-No significant weight loss,    3. Fat Loss-No significant subcutaneous fat loss,    4. Muscle Loss-No significant muscle mass loss,    5. Fluid Accumulation-No significant fluid accumulation,    6.   Strength-Not measured    Nutrition Risk Level: High    Nutrient Needs:  · Estimated Daily Total Kcal: 2308-3763 kcals (20-25 kcal/kgm- 94kgm 2/24)  · Estimated Daily Protein (g):  grams (1-1.4 grams protein/kgm- ideal 81kgm) monitoring renal status  · Estimated Daily Total Fluid (ml/day): per MD    Nutrition Diagnosis:   · Problem: Inadequate oral intake  · Etiology: related to Impaired respiratory function-inability to consume food     Signs and symptoms:  as evidenced by NPO status due to medical condition, Intubation    Objective Information:  · Nutrition-Focused Physical

## 2020-02-25 LAB
ANION GAP SERPL CALCULATED.3IONS-SCNC: 13 MEQ/L (ref 8–16)
BASOPHILS # BLD: 0.1 %
BASOPHILS ABSOLUTE: 0 THOU/MM3 (ref 0–0.1)
BUN BLDV-MCNC: 18 MG/DL (ref 7–22)
CALCIUM SERPL-MCNC: 8.1 MG/DL (ref 8.5–10.5)
CHLORIDE BLD-SCNC: 104 MEQ/L (ref 98–111)
CO2: 22 MEQ/L (ref 23–33)
CREAT SERPL-MCNC: 1.3 MG/DL (ref 0.4–1.2)
EOSINOPHIL # BLD: 1 %
EOSINOPHILS ABSOLUTE: 0.1 THOU/MM3 (ref 0–0.4)
ERYTHROCYTE [DISTWIDTH] IN BLOOD BY AUTOMATED COUNT: 14.4 % (ref 11.5–14.5)
ERYTHROCYTE [DISTWIDTH] IN BLOOD BY AUTOMATED COUNT: 51.4 FL (ref 35–45)
GFR SERPL CREATININE-BSD FRML MDRD: 55 ML/MIN/1.73M2
GLUCOSE BLD-MCNC: 132 MG/DL (ref 70–108)
HCT VFR BLD CALC: 35.6 % (ref 42–52)
HEMOGLOBIN: 11.4 GM/DL (ref 14–18)
IMMATURE GRANS (ABS): 0.06 THOU/MM3 (ref 0–0.07)
IMMATURE GRANULOCYTES: 0.4 %
LYMPHOCYTES # BLD: 7.3 %
LYMPHOCYTES ABSOLUTE: 1 THOU/MM3 (ref 1–4.8)
MAGNESIUM: 2.2 MG/DL (ref 1.6–2.4)
MCH RBC QN AUTO: 31.2 PG (ref 26–33)
MCHC RBC AUTO-ENTMCNC: 32 GM/DL (ref 32.2–35.5)
MCV RBC AUTO: 97.5 FL (ref 80–94)
MONOCYTES # BLD: 9.8 %
MONOCYTES ABSOLUTE: 1.3 THOU/MM3 (ref 0.4–1.3)
NUCLEATED RED BLOOD CELLS: 0 /100 WBC
PLATELET # BLD: 196 THOU/MM3 (ref 130–400)
PMV BLD AUTO: 11.7 FL (ref 9.4–12.4)
POTASSIUM SERPL-SCNC: 4.3 MEQ/L (ref 3.5–5.2)
RBC # BLD: 3.65 MILL/MM3 (ref 4.7–6.1)
SEG NEUTROPHILS: 81.4 %
SEGMENTED NEUTROPHILS ABSOLUTE COUNT: 10.9 THOU/MM3 (ref 1.8–7.7)
SODIUM BLD-SCNC: 139 MEQ/L (ref 135–145)
WBC # BLD: 13.4 THOU/MM3 (ref 4.8–10.8)

## 2020-02-25 PROCEDURE — 2580000003 HC RX 258: Performed by: NURSE PRACTITIONER

## 2020-02-25 PROCEDURE — 6370000000 HC RX 637 (ALT 250 FOR IP): Performed by: NURSE PRACTITIONER

## 2020-02-25 PROCEDURE — 2709999900 HC NON-CHARGEABLE SUPPLY

## 2020-02-25 PROCEDURE — 80048 BASIC METABOLIC PNL TOTAL CA: CPT

## 2020-02-25 PROCEDURE — 6360000002 HC RX W HCPCS: Performed by: NURSE PRACTITIONER

## 2020-02-25 PROCEDURE — C9113 INJ PANTOPRAZOLE SODIUM, VIA: HCPCS | Performed by: NURSE PRACTITIONER

## 2020-02-25 PROCEDURE — 83735 ASSAY OF MAGNESIUM: CPT

## 2020-02-25 PROCEDURE — APPSS30 APP SPLIT SHARED TIME 16-30 MINUTES: Performed by: NURSE PRACTITIONER

## 2020-02-25 PROCEDURE — 94003 VENT MGMT INPAT SUBQ DAY: CPT

## 2020-02-25 PROCEDURE — 2700000000 HC OXYGEN THERAPY PER DAY

## 2020-02-25 PROCEDURE — 94761 N-INVAS EAR/PLS OXIMETRY MLT: CPT

## 2020-02-25 PROCEDURE — 1200000003 HC TELEMETRY R&B

## 2020-02-25 PROCEDURE — 36415 COLL VENOUS BLD VENIPUNCTURE: CPT

## 2020-02-25 PROCEDURE — 85025 COMPLETE CBC W/AUTO DIFF WBC: CPT

## 2020-02-25 PROCEDURE — 94640 AIRWAY INHALATION TREATMENT: CPT

## 2020-02-25 PROCEDURE — 99233 SBSQ HOSP IP/OBS HIGH 50: CPT | Performed by: INTERNAL MEDICINE

## 2020-02-25 PROCEDURE — 94770 HC ETCO2 MONITOR DAILY: CPT

## 2020-02-25 PROCEDURE — 2500000003 HC RX 250 WO HCPCS: Performed by: NURSE PRACTITIONER

## 2020-02-25 RX ORDER — CHLORHEXIDINE GLUCONATE 0.12 MG/ML
15 RINSE ORAL 2 TIMES DAILY
Status: DISCONTINUED | OUTPATIENT
Start: 2020-02-25 | End: 2020-02-26

## 2020-02-25 RX ADMIN — METHYLPREDNISOLONE SODIUM SUCCINATE 40 MG: 40 INJECTION, POWDER, FOR SOLUTION INTRAMUSCULAR; INTRAVENOUS at 08:15

## 2020-02-25 RX ADMIN — PANTOPRAZOLE SODIUM 40 MG: 40 INJECTION, POWDER, FOR SOLUTION INTRAVENOUS at 08:10

## 2020-02-25 RX ADMIN — Medication 15 ML: at 21:24

## 2020-02-25 RX ADMIN — SODIUM CHLORIDE: 9 INJECTION, SOLUTION INTRAVENOUS at 21:27

## 2020-02-25 RX ADMIN — ALBUTEROL SULFATE 2.5 MG: 2.5 SOLUTION RESPIRATORY (INHALATION) at 08:39

## 2020-02-25 RX ADMIN — SODIUM CHLORIDE: 9 INJECTION, SOLUTION INTRAVENOUS at 12:02

## 2020-02-25 RX ADMIN — AZITHROMYCIN MONOHYDRATE 500 MG: 500 INJECTION, POWDER, LYOPHILIZED, FOR SOLUTION INTRAVENOUS at 10:54

## 2020-02-25 RX ADMIN — GLYCOPYRROLATE AND FORMOTEROL FUMARATE 2 PUFF: 9; 4.8 AEROSOL, METERED RESPIRATORY (INHALATION) at 08:37

## 2020-02-25 RX ADMIN — ENOXAPARIN SODIUM 40 MG: 40 INJECTION SUBCUTANEOUS at 08:09

## 2020-02-25 RX ADMIN — ATORVASTATIN CALCIUM 40 MG: 40 TABLET, FILM COATED ORAL at 08:10

## 2020-02-25 RX ADMIN — CLOPIDOGREL BISULFATE 75 MG: 75 TABLET ORAL at 08:10

## 2020-02-25 RX ADMIN — PIPERACILLIN AND TAZOBACTAM 3.38 G: 3; .375 INJECTION, POWDER, LYOPHILIZED, FOR SOLUTION INTRAVENOUS at 03:10

## 2020-02-25 RX ADMIN — PROPOFOL 30 MCG/KG/MIN: 10 INJECTION, EMULSION INTRAVENOUS at 03:29

## 2020-02-25 RX ADMIN — GLYCOPYRROLATE AND FORMOTEROL FUMARATE 2 PUFF: 9; 4.8 AEROSOL, METERED RESPIRATORY (INHALATION) at 20:57

## 2020-02-25 RX ADMIN — DEXMEDETOMIDINE 0.2 MCG/KG/HR: 100 INJECTION, SOLUTION, CONCENTRATE INTRAVENOUS at 01:40

## 2020-02-25 ASSESSMENT — PULMONARY FUNCTION TESTS
PIF_VALUE: 18
PIF_VALUE: 18
PIF_VALUE: 15
PIF_VALUE: 15

## 2020-02-25 ASSESSMENT — PAIN SCALES - GENERAL
PAINLEVEL_OUTOF10: 0

## 2020-02-25 NOTE — PLAN OF CARE
of any services needed to be addressed. Patient intubated and sedated, no family present. Unable to review care plan at this time.

## 2020-02-25 NOTE — CARE COORDINATION
2/25/20, 7:34 AM  DISCHARGE PLANNING EVALUATION:    Wili Fregoso       Admitted from: ED 2/24/2020/ [facility] day: 1   Location: -11/011-A Reason for admit: Acute respiratory failure ([facility] [address] [J96.00] Status: IP  Admit order signed?: yes  PMH:  has a past medical history of Arthritis, Cancer ([facility] [address] Elevated PSA, Gout, History of heart artery stent, and Hypertension. Procedure:   2/24 CXR: No acute process; COPD  2/24 Intubated  2/24 CXR: There are reticular interstitial infiltrates throughout both lung fields. There is no pleural effusion  Medications:  Scheduled Meds:   methylPREDNISolone  40 mg Intravenous Daily    atorvastatin  40 mg Oral Daily    clopidogrel  75 mg Oral Daily    glycopyrrolate-formoterol  2 puff Inhalation BID    azithromycin  500 mg Intravenous Q24H    piperacillin-tazobactam  3.375 g Intravenous Q8H    magnesium replacement protocol   Other RX Placeholder    pantoprazole  40 mg Intravenous Daily    enoxaparin  40 mg Subcutaneous Daily    chlorhexidine  15 mL Mouth/Throat BID     Continuous Infusions:   dexmedetomidine 0.3 mcg/kg/hr (02/25/20 0647)    norepinephrine      sodium chloride 100 mL/hr at 02/24/20 0810    propofol 5 mcg/kg/min (02/25/20 0318)      Pertinent Info/Orders/Treatment Plan: Presented to [facility] by private vehicle with c/o extreme SOB, was tripoding and could barely speak. Reports having trouble breathing for several weeks, but couldn't stand it anymore. Sats on arrival were 71%. Hx of COPD. Placed on NRB mask and given ativan and solumedrol. Cpap mask made him more anxious and he ripped it off. Became more dyspneic and required intubation. Transferred to [facility] ICU. Remains on vent w/ETT on SBT, FIO2 40%, sats 92%. Afebrile. NSR. Follows commands. PT/OT. Dietitian consulted. Cardiac monitoring, I&O, oral care, OG to [facility], [facility].  IVF, precedex @ 0.4 mcg/kg/hr, diprivan @ 5 mcg/kg/min, lipitor, IV zithromax, plavix, lovenox, inhaler, IV solumedrol 40 mg daily, protonix, IV zosyn. Received 1.5L in fld bolus. CO2 22, BUN 23 - now 18, Creat 1.7 - now 1.3, Mg 1.7 now 2.2, trop neg, wbc 15.4 - now 13.4, hgb 14.1 -now 11.4. Respiratory and urine cultures sent. Rapid flu was neg. Diet: No diet orders on file   Smoking status:  reports that he has been smoking cigarettes. He has a 44.00 pack-year smoking history. He has never used smokeless tobacco.   PCP: Alistair Woodward  Readmission 30 days or less: no  Readmission Risk Score: 20%    Discharge Planning Evaluation  Current Residence:     Living Arrangements:      Support Systems:     Current Services PTA:     Potential Assistance Needed:     Potential Assistance Purchasing Medications:     Does patient want to participate in local refill/ meds to beds program?     Type of Home Care Services:     Patient expects to be discharged to:     Expected Discharge date: Follow Up Appointment: Best Day/ Time:      Patient Goals/Plan/Treatment Preferences: Spoke with Markel's wife, Celine Black; states he did not use any DME or have any  services PTA. He is retired, drives, cares for himself independently, and has a PCP. Plan home with wife at discharge; monitor for possible needs. Transportation/Food Security/Housekeeping Addressed:  No issues identified.  Evaluation: no    ICU Understanding Delirium pamphlet given to patient's family.

## 2020-02-25 NOTE — PROGRESS NOTES
CBC.  4.  Acute Kidney Injury on Chronic Kidney Disease stage III:   (Improving)  Baseline creatinine appears 1.1. Current BUN 18/creatinine 1.3 s/p bolus IV fluid and continuous IV fluids. 1.5 L urine output last 24 hours. Trend BMP. Hold ACE/ARB. Avoid nephrotoxins. Renal-dose adjust medications. Continue strict Intake/outptut & daily weights. 5. Hypotension:   (Improving/intermittent) unclear etiology-likely related to sedation medications. S/P 2,500 mL IV bolus. Levophed IV ordered but not needed. Continiue IV fluids. 6. Normocytic Anemia:  (Stable) Hemoglobin 11.4/hematocrit 35.6. Likely related to fluid resuscitation. No active signs of bleeding. Trend CBC. 7. Hypomagnesia:   (Resolved) S/P one dose IV magnesium. Recheck magnesium 2.2. Repalce per protocol with ordered rechecks. Trend BMP. 8. Benign Essential Hypertension:  (currently borderline hypotensive) On lisinopril-hydrochlorothiazide & metoprolol outpatient-continue to hold. 9. Coronary Artery Disease:  Troponin trend-negative. EKG originally demonstrated ST with PVC's, pul. disease pattern; non-specific ST pattern. Continue Plavix. 10. Hyperlipidemia:  Continue statin. INITIAL H AND P AND ICU COURSE:    (from chart review) 70-year-old  male, current everyday smoker, past medical history hypertension, gout, prostate cancer, COPD, hyperlipidemia, daily alcohol use presented to [facility] with shortness of breath for the last several weeks per ED provider note review. Initial oxygen saturation 71%. Patient was seen 6 to 7 weeks ago at outlying facility with similar symptoms. Patient had been taking more frequent home breathing treatments. On original evaluation he denied chest pain but did have palpitations. Patient was trialed on nonrebreather mask, given 2 DuoNeb treatments, and 10 mg albuterol treatment.   Also given 1 mg Ativan IV, Solu-Medrol 125 mg IV, & magnesium sulfate 1 g IV. Patient did not tolerate CPAP. Patient, per notes, originally refused intubation however as his dyspnea progressed family told the provider he wanted to be placed on the ventilator. Patient patient received Versed, 200 mg succinylcholine, & propofol for intubation- followed by IV drip. Patient was transffered to [facility].     Patient remains sedated and does not open eyes or follow commands. Patient withdraws to painful stimuli. Patient noted to be hypotensive with blood pressure in the 50s. No family present. Subjective:- (Last 24 hours)    No acute vents overnight. Patient orally intubated and sedated-unable to assess ROS. Follows commands and nods to questions asked. Past medical history, family history, social history and allergies reviewed again and is unchanged since admission. ROS (12 point review of systems completed. Pertinent positives noted.  Otherwise ROS is negative)      Scheduled Meds:   methylPREDNISolone  40 mg Intravenous Daily    atorvastatin  40 mg Oral Daily    clopidogrel  75 mg Oral Daily    glycopyrrolate-formoterol  2 puff Inhalation BID    azithromycin  500 mg Intravenous Q24H    piperacillin-tazobactam  3.375 g Intravenous Q8H    magnesium replacement protocol   Other RX Placeholder    pantoprazole  40 mg Intravenous Daily    enoxaparin  40 mg Subcutaneous Daily    chlorhexidine  15 mL Mouth/Throat BID     Continuous Infusions:   dexmedetomidine 0.3 mcg/kg/hr (02/25/20 0732)    norepinephrine      sodium chloride 100 mL/hr at 02/25/20 0732    propofol 5 mcg/kg/min (02/25/20 0732)     PRN Meds:.albuterol     PHYSICAL EXAMINATION:  Patient Vitals for the past 8 hrs:   BP Temp Temp src Pulse Resp SpO2 Weight   02/25/20 1005 92/60 -- -- 77 14 90 % --   02/25/20 1000 -- -- -- -- -- 93 % --   02/25/20 0935 (!) 87/57 -- -- 74 16 90 % --   02/25/20 0905 (!) 97/58 -- -- 82 15 94 % --   02/25/20 0846 -- -- -- 75 15 93 % --   02/25/20 0805 -- -- -- -- 17 96 % --   02/25/20 0835 (!) 104/57 -- -- 88 16 95 % --   02/25/20 0805 99/68 97.7 °F (36.5 °C) Oral 63 18 96 % --   02/25/20 0732 93/63 -- -- 58 18 98 % --   02/25/20 0712 91/69 -- -- 59 16 97 % --   02/25/20 0702 86/61 -- -- 63 17 95 % --   02/25/20 0632 85/60 -- -- 64 17 94 % --   02/25/20 0602 (!) 81/58 -- -- 72 19 92 % --   02/25/20 0556 -- -- -- 72 20 92 % --   02/25/20 0532 99/62 -- -- 73 22 96 % --   02/25/20 0502 (!) 90/58 -- -- 74 16 91 % 205 lb 4 oz (93.1 kg)   02/25/20 0446 106/78 -- -- -- -- -- --   02/25/20 0439 -- -- -- 72 20 98 % --   02/25/20 0433 99/67 -- -- 69 20 99 % --   02/25/20 0418 84/63 -- -- -- -- -- --   02/25/20 0402 (!) 84/55 97.8 °F (36.6 °C) Oral 75 19 93 % --   02/25/20 0302 (!) 86/58 -- -- 79 19 93 % --     I/O last 3 completed shifts: In: 1633.5 [I.V.:1633.5]  Out: 1530 [Urine:1500; Emesis/NG output:30]   Wt Readings from Last 3 Encounters:   02/25/20 205 lb 4 oz (93.1 kg)   01/06/20 200 lb (90.7 kg)   06/22/18 200 lb (90.7 kg)      Body mass index is 27.84 kg/m². CAM-ICU:     General:   Arousable and cooperative  male orally intubated and sedated. HEENT:  normocephalic and atraumatic. No scleral icterus. PERR. Neck: supple. No thyromegaly. Lungs: clear to auscultation bilaterally without rhonchi/wheezes/rales  No retractions  Cardiac: RRR, S1/S2. No JVD. Abdomen: soft, non-tender, non-distended with hypoactive bowel sounds times four quads. OG draining bile. Extremities:  No clubbing, cyanosis, or edema x 4. Vasculature: capillary refill < 3 seconds. Palpable dorsalis pedis pulses. Skin:  Pink, warm and dry. Psych:  Alert and oriented x3. Affect appropriate  Lymph:  No supraclavicular adenopathy. Neurologic:  No focal deficit. No seizures. Data: (All radiographs, tracings, PFTs, and imaging are personally viewed and interpreted unless otherwise noted).  Telemetry: Normal sinus rhythm rate 88.   CURRENT PARENTERAL VASOACTIVE / technology. **      Final report electronically signed by Dr. Mitali Be on 2/24/2020 6:05 AM        Xr Chest Portable    Result Date: 2/24/2020  PROCEDURE: XR CHEST PORTABLE CLINICAL INFORMATION: Endotracheal and orogastric tube placement. COMPARISON: 2/24/2020. TECHNIQUE: AP portable chest radiograph performed. FINDINGS: POSTSURGICAL CHANGES: None. LINES/TUBES/MECHANICAL DEVICES: 1. The distal tip of the endotracheal tube is 5.5 cm above the level of the shelton. 2. The distal tip of the esophageal tube projects over the body of the stomach. TRACHEA/HEART/MEDIASTINUM/HILUM: Unremarkable. LUNG FIELDS: 1. There are reticular interstitial infiltrates throughout both lung fields. There is no pleural effusion. OTHER: None. PNEUMOTHORAX:  None. OSSEOUS STRUCTURES: 1. No acute osseous abnormality. 1. The distal tip of the endotracheal tube is 5.5 cm above the level of the shelton. 2. The distal tip of the esophageal tube projects over the body of the stomach. 3. There are reticular interstitial infiltrates throughout both lung fields. There is no pleural effusion. Follow-up chest radiograph recommended to confirm complete resolution. **This report has been created using voice recognition software. It may contain minor errors which are inherent in voice recognition technology. ** Final report electronically signed by Dr. Shaheed Aparicio on 2/24/2020 8:45 AM    Xr Chest Portable    Result Date: 2/24/2020  PROCEDURE: XR CHEST PORTABLE CLINICAL INFORMATION: intub. COMPARISON: Chest x-ray earlier today at 5:50 AM TECHNIQUE: AP Portable chest xray FINDINGS: Lines/tubes/devices: Interval intubation, ET tube tip 7.5 cm above the shelton at the thoracic inlet. Lungs/pleura:  No pneumonia, pulmonary edema, or obvious mass. There is mild right basilar atelectasis. Costophrenic angles are clipped however there is no significant pleural effusion. No pneumothorax.  Heart: Heart size is normal. Mediastinum/shayan: No obvious mass or

## 2020-02-26 LAB
ANION GAP SERPL CALCULATED.3IONS-SCNC: 15 MEQ/L (ref 8–16)
BUN BLDV-MCNC: 18 MG/DL (ref 7–22)
CALCIUM SERPL-MCNC: 8.4 MG/DL (ref 8.5–10.5)
CHLORIDE BLD-SCNC: 111 MEQ/L (ref 98–111)
CO2: 20 MEQ/L (ref 23–33)
CREAT SERPL-MCNC: 1.1 MG/DL (ref 0.4–1.2)
ERYTHROCYTE [DISTWIDTH] IN BLOOD BY AUTOMATED COUNT: 14.6 % (ref 11.5–14.5)
ERYTHROCYTE [DISTWIDTH] IN BLOOD BY AUTOMATED COUNT: 53.1 FL (ref 35–45)
GFR SERPL CREATININE-BSD FRML MDRD: 66 ML/MIN/1.73M2
GLUCOSE BLD-MCNC: 136 MG/DL (ref 70–108)
GRAM STAIN RESULT: NORMAL
HCT VFR BLD CALC: 38.3 % (ref 42–52)
HEMOGLOBIN: 11.9 GM/DL (ref 14–18)
MCH RBC QN AUTO: 31 PG (ref 26–33)
MCHC RBC AUTO-ENTMCNC: 31.1 GM/DL (ref 32.2–35.5)
MCV RBC AUTO: 99.7 FL (ref 80–94)
MRSA SCREEN: NORMAL
PLATELET # BLD: 198 THOU/MM3 (ref 130–400)
PMV BLD AUTO: 11.9 FL (ref 9.4–12.4)
POTASSIUM SERPL-SCNC: 3.7 MEQ/L (ref 3.5–5.2)
RBC # BLD: 3.84 MILL/MM3 (ref 4.7–6.1)
RESPIRATORY CULTURE: NORMAL
SODIUM BLD-SCNC: 146 MEQ/L (ref 135–145)
URINE CULTURE, ROUTINE: NORMAL
WBC # BLD: 13 THOU/MM3 (ref 4.8–10.8)

## 2020-02-26 PROCEDURE — 2709999900 HC NON-CHARGEABLE SUPPLY

## 2020-02-26 PROCEDURE — 6370000000 HC RX 637 (ALT 250 FOR IP): Performed by: NURSE PRACTITIONER

## 2020-02-26 PROCEDURE — 94640 AIRWAY INHALATION TREATMENT: CPT

## 2020-02-26 PROCEDURE — 80048 BASIC METABOLIC PNL TOTAL CA: CPT

## 2020-02-26 PROCEDURE — 2700000000 HC OXYGEN THERAPY PER DAY

## 2020-02-26 PROCEDURE — 1200000003 HC TELEMETRY R&B

## 2020-02-26 PROCEDURE — 99232 SBSQ HOSP IP/OBS MODERATE 35: CPT | Performed by: NURSE PRACTITIONER

## 2020-02-26 PROCEDURE — 85027 COMPLETE CBC AUTOMATED: CPT

## 2020-02-26 PROCEDURE — 97530 THERAPEUTIC ACTIVITIES: CPT

## 2020-02-26 PROCEDURE — APPSS30 APP SPLIT SHARED TIME 16-30 MINUTES: Performed by: NURSE PRACTITIONER

## 2020-02-26 PROCEDURE — 6360000002 HC RX W HCPCS: Performed by: NURSE PRACTITIONER

## 2020-02-26 PROCEDURE — 36415 COLL VENOUS BLD VENIPUNCTURE: CPT

## 2020-02-26 PROCEDURE — 2580000003 HC RX 258: Performed by: NURSE PRACTITIONER

## 2020-02-26 PROCEDURE — 97165 OT EVAL LOW COMPLEX 30 MIN: CPT

## 2020-02-26 PROCEDURE — 94761 N-INVAS EAR/PLS OXIMETRY MLT: CPT

## 2020-02-26 PROCEDURE — 99233 SBSQ HOSP IP/OBS HIGH 50: CPT | Performed by: INTERNAL MEDICINE

## 2020-02-26 RX ORDER — PREDNISONE 20 MG/1
40 TABLET ORAL DAILY
Status: DISCONTINUED | OUTPATIENT
Start: 2020-02-27 | End: 2020-02-27 | Stop reason: HOSPADM

## 2020-02-26 RX ORDER — CHLORHEXIDINE GLUCONATE 0.12 MG/ML
15 RINSE ORAL 2 TIMES DAILY
Status: DISCONTINUED | OUTPATIENT
Start: 2020-02-26 | End: 2020-02-27 | Stop reason: HOSPADM

## 2020-02-26 RX ORDER — ALLOPURINOL 100 MG/1
100 TABLET ORAL 2 TIMES DAILY
Status: DISCONTINUED | OUTPATIENT
Start: 2020-02-26 | End: 2020-02-27

## 2020-02-26 RX ORDER — NICOTINE 21 MG/24HR
1 PATCH, TRANSDERMAL 24 HOURS TRANSDERMAL DAILY
Status: DISCONTINUED | OUTPATIENT
Start: 2020-02-26 | End: 2020-02-27 | Stop reason: HOSPADM

## 2020-02-26 RX ORDER — HYDROCHLOROTHIAZIDE 25 MG/1
12.5 TABLET ORAL DAILY
Status: DISCONTINUED | OUTPATIENT
Start: 2020-02-26 | End: 2020-02-27 | Stop reason: HOSPADM

## 2020-02-26 RX ORDER — ALLOPURINOL 300 MG/1
300 TABLET ORAL DAILY
COMMUNITY

## 2020-02-26 RX ORDER — ALBUTEROL SULFATE 2.5 MG/3ML
2.5 SOLUTION RESPIRATORY (INHALATION)
Status: DISCONTINUED | OUTPATIENT
Start: 2020-02-26 | End: 2020-02-27 | Stop reason: HOSPADM

## 2020-02-26 RX ORDER — METOPROLOL SUCCINATE 25 MG/1
25 TABLET, EXTENDED RELEASE ORAL DAILY
COMMUNITY

## 2020-02-26 RX ORDER — LISINOPRIL 10 MG/1
10 TABLET ORAL DAILY
Status: DISCONTINUED | OUTPATIENT
Start: 2020-02-26 | End: 2020-02-27 | Stop reason: HOSPADM

## 2020-02-26 RX ADMIN — METHYLPREDNISOLONE SODIUM SUCCINATE 40 MG: 40 INJECTION, POWDER, FOR SOLUTION INTRAMUSCULAR; INTRAVENOUS at 09:01

## 2020-02-26 RX ADMIN — ATORVASTATIN CALCIUM 40 MG: 40 TABLET, FILM COATED ORAL at 09:00

## 2020-02-26 RX ADMIN — ALLOPURINOL 100 MG: 100 TABLET ORAL at 15:56

## 2020-02-26 RX ADMIN — HYDROCHLOROTHIAZIDE 12.5 MG: 25 TABLET ORAL at 15:56

## 2020-02-26 RX ADMIN — GLYCOPYRROLATE AND FORMOTEROL FUMARATE 2 PUFF: 9; 4.8 AEROSOL, METERED RESPIRATORY (INHALATION) at 08:30

## 2020-02-26 RX ADMIN — ENOXAPARIN SODIUM 40 MG: 40 INJECTION SUBCUTANEOUS at 09:00

## 2020-02-26 RX ADMIN — METOPROLOL TARTRATE 25 MG: 25 TABLET, FILM COATED ORAL at 15:56

## 2020-02-26 RX ADMIN — GLYCOPYRROLATE AND FORMOTEROL FUMARATE 2 PUFF: 9; 4.8 AEROSOL, METERED RESPIRATORY (INHALATION) at 21:16

## 2020-02-26 RX ADMIN — Medication 15 ML: at 15:58

## 2020-02-26 RX ADMIN — ALBUTEROL SULFATE 2.5 MG: 2.5 SOLUTION RESPIRATORY (INHALATION) at 13:05

## 2020-02-26 RX ADMIN — LISINOPRIL 10 MG: 10 TABLET ORAL at 15:56

## 2020-02-26 RX ADMIN — CLOPIDOGREL BISULFATE 75 MG: 75 TABLET ORAL at 09:00

## 2020-02-26 RX ADMIN — METOPROLOL TARTRATE 25 MG: 25 TABLET, FILM COATED ORAL at 21:07

## 2020-02-26 RX ADMIN — Medication 15 ML: at 21:06

## 2020-02-26 RX ADMIN — ALLOPURINOL 100 MG: 100 TABLET ORAL at 21:07

## 2020-02-26 RX ADMIN — ALBUTEROL SULFATE 2.5 MG: 2.5 SOLUTION RESPIRATORY (INHALATION) at 21:16

## 2020-02-26 RX ADMIN — ALBUTEROL SULFATE 2.5 MG: 2.5 SOLUTION RESPIRATORY (INHALATION) at 16:39

## 2020-02-26 RX ADMIN — SODIUM CHLORIDE: 9 INJECTION, SOLUTION INTRAVENOUS at 08:49

## 2020-02-26 ASSESSMENT — PAIN SCALES - GENERAL
PAINLEVEL_OUTOF10: 0

## 2020-02-26 NOTE — PLAN OF CARE
Problem: Nutrition  Goal: Optimal nutrition therapy  2/25/2020 2205 by Devendra Malone RN  Outcome: Ongoing  Note:   Tolerating current diet plan at this time. Problem: Risk for Impaired Skin Integrity  Goal: Tissue integrity - skin and mucous membranes  Description  Structural intactness and normal physiological function of skin and  mucous membranes. 2/25/2020 2205 by Devendra Malone RN  Outcome: Ongoing  Note:   Patient turns self independently, assistance provided when needed. Patient educated on the importance of turning self frequently to prevent breakdown. No new skin changes noted thus far this shift. Will continue to monitor. Problem: Falls - Risk of:  Goal: Will remain free from falls  Description  Will remain free from falls  2/25/2020 2205 by Devendra Malone RN  Outcome: Ongoing  Note:   Patient alert and oriented x4. Bed alarmed armed. Bed wheels locked. Bedside table in reach. Patient up with assistance when ambulating. Patient verbalizes and demonstrates the use of the call light. Hourly rounding being completed. Problem: Falls - Risk of:  Goal: Absence of physical injury  Description  Absence of physical injury  2/25/2020 2205 by Devendra Malone RN  Outcome: Ongoing  Note:   Free from physical injury at this time. Will continue to monitor. Problem: RESPIRATORY  Goal: Lung sounds clear or within normal limits for patient  2/25/2020 2205 by Devendra Malone RN  Outcome: Ongoing  Note:   Expiratory wheezes     Problem: OXYGENATION/RESPIRATORY FUNCTION  Goal: Ability to maintain adequate oxygenation will improve  Description  Ability to maintain adequate oxygenation will improve     2/25/2020 2205 by Devendra Malone RN  Outcome: Ongoing  Note:   Patient tolerating 6 L nasal cannula. Will continue to wean when necessary.       Problem: Discharge Planning:  Goal: Discharged to appropriate level of care  Description  Discharged to appropriate level of care  2/25/2020 2205 by Barbara Spaulding Sedrick Ryan RN  Outcome: Ongoing  Note:   Patient plans to return home with wife at discharge and no needs voiced at this time. Patient working with social work for discharge planning. Care plan reviewed with patient and family. Patient and family verbalize understanding of the plan of care and contribute to goal setting.

## 2020-02-26 NOTE — CARE COORDINATION
2/26/20, 11:51 AM    DISCHARGE ON GOING EVALUATION    Miguel Hoffman day: 2  Location: [facility]17/017- Reason for admit: Acute respiratory failure ([address] [J96.00]   Procedure:   2/24 CXR: No acute process; COPD  2/24 Intubated  2/24 CXR: There are reticular interstitial infiltrates throughout both lung fields. There is no pleural effusion  Treatment Plan of Care: from ICU prior, WBC 13.4; monitor.  Pulmonary following  Barriers to Discharge: Oxygen 5L; weaning as able, IVF continued  PCP: Kt Oro  Readmission Risk Score: 18%  Patient Goals/Plan/Treatment Preferences: plans home with spouse; therapies following; await therapy input, has nebulizer, monitor for home oxygen eval if not weaned off; will ask physician

## 2020-02-26 NOTE — PROGRESS NOTES
Hospitalist Progress Note      Patient:  Liss Lopez    Unit/Bed:4K-17/017-A  YOB: 1950  MRN: [id number]   Acct: [de-identified]   PCP: Brea Santoyo  Date of Admission: 2/24/2020    Assessment/Plan:    1. Acute hypercapnic/hypoxic respiratory failure: Improving. Patient currently requires 4-5L/min of oxygen via nasal cannula with O2 saturations at 91%. Continue to wean oxygen as tolerated. 2. COPD, acute exacerbation: Improving. Pneumonia not likely. Patient refusing BiPAP. Continue albuterol nebulization every 4 hours while awake. Transition to oral prednisone tomorrow. Continue Bevespi BID. Continue incentive spirometry. Repeat CXR tomorrow morning. Pulmonology following, appreciate assistance. 3. Leukocytosis: Improving. Current WBC count 13, trending down from 15.4 on 2/24. Recheck CBC in the morning. 4. Acute kidney injury on chronic kidney disease, stage III: Improving. 5. Hypotension: Resolved. Nada Liss secondary to sedation medications. 6. Acute normocytic anemia: Improving, stable. Current H&H 11.9/38.3 with an MCV of 99.7. No overt signs of bleeding at this time, continue to monitor. 7. Hypomagnesemia: Resolved. Current magnesium level 2.2.  8. Benign essential hypertension: History, stable. Restart lisinopril, hydrochlorothiazide, and metoprolol. 9. Coronary artery disease: History. Continue Plavix. 10. Hyperlipidemia: History. Continue atorvastatin. 11. Gout: History. Continue allopurinol. 12. Tobacco abuse: Patient counseled for 5 minutes on the importance of smoking cessation. Continue NRT therapy. Chief Complaint: Respiratory distress    Initial H and P:-    Richard Rey is a 70-year-old  male, daily smoker, with a medical history of hypertension, gout, elevated PSA, prostate cancer in 2017, and arthritis.   Patient presents [facility] from [facility] ambulatory care 1.020 10/03/2017    GLUCOSEU Negative 09/21/2017       Radiology:  XR CHEST PORTABLE   Final Result   1. The distal tip of the endotracheal tube is 5.5 cm above the level of the shelton. 2. The distal tip of the esophageal tube projects over the body of the stomach. 3. There are reticular interstitial infiltrates throughout both lung fields. There is no pleural effusion. Follow-up chest radiograph recommended to confirm complete resolution. **This report has been created using voice recognition software. It may contain minor errors which are inherent in voice recognition technology. **      Final report electronically signed by Dr. Lise Tucker on 2/24/2020 8:45 AM      XR CHEST PORTABLE   Final Result   ET tube tip 7.5 cm above the shelton at the thoracic inlet. No acute pneumonia. New mild right basilar atelectasis. **This report has been created using voice recognition software. It may contain minor errors which are inherent in voice recognition technology. **      Final report electronically signed by Dr. Shahram Quintanilla on 2/24/2020 6:54 AM      XR CHEST PORTABLE   Final Result      No acute cardiopulmonary disease. COPD. **This report has been created using voice recognition software. It may contain minor errors which are inherent in voice recognition technology. **      Final report electronically signed by Dr. Shahram Quintanilla on 2/24/2020 6:05 AM      XR CHEST STANDARD (2 VW)    (Results Pending)     Xr Chest Portable    Result Date: 2/24/2020  PROCEDURE: XR CHEST PORTABLE CLINICAL INFORMATION: Endotracheal and orogastric tube placement. COMPARISON: 2/24/2020. TECHNIQUE: AP portable chest radiograph performed. FINDINGS: POSTSURGICAL CHANGES: None. LINES/TUBES/MECHANICAL DEVICES: 1. The distal tip of the endotracheal tube is 5.5 cm above the level of the shelton. 2. The distal tip of the esophageal tube projects over the body of the stomach.  TRACHEA/HEART/MEDIASTINUM/HILUM: Unremarkable. LUNG FIELDS: 1. There are reticular interstitial infiltrates throughout both lung fields. There is no pleural effusion. OTHER: None. PNEUMOTHORAX:  None. OSSEOUS STRUCTURES: 1. No acute osseous abnormality. 1. The distal tip of the endotracheal tube is 5.5 cm above the level of the shelton. 2. The distal tip of the esophageal tube projects over the body of the stomach. 3. There are reticular interstitial infiltrates throughout both lung fields. There is no pleural effusion. Follow-up chest radiograph recommended to confirm complete resolution. **This report has been created using voice recognition software. It may contain minor errors which are inherent in voice recognition technology. ** Final report electronically signed by Dr. Gustavo Gee on 2/24/2020 8:45 AM    Xr Chest Portable    Result Date: 2/24/2020  PROCEDURE: XR CHEST PORTABLE CLINICAL INFORMATION: intub. COMPARISON: Chest x-ray earlier today at 5:50 AM TECHNIQUE: AP Portable chest xray FINDINGS: Lines/tubes/devices: Interval intubation, ET tube tip 7.5 cm above the shelton at the thoracic inlet. Lungs/pleura:  No pneumonia, pulmonary edema, or obvious mass. There is mild right basilar atelectasis. Costophrenic angles are clipped however there is no significant pleural effusion. No pneumothorax. Heart: Heart size is normal. Mediastinum/shayan: No obvious mass or adenopathy. Skeleton: No significant bone or joint abnormality. ET tube tip 7.5 cm above the shelton at the thoracic inlet. No acute pneumonia. New mild right basilar atelectasis. **This report has been created using voice recognition software. It may contain minor errors which are inherent in voice recognition technology. ** Final report electronically signed by Dr. Manuel Browne on 2/24/2020 6:54 AM    Xr Chest Portable    Result Date: 2/24/2020  PROCEDURE: XR CHEST PORTABLE CLINICAL INFORMATION: dyspnea.  COMPARISON: Chest x-ray dated 1/6/2020 TECHNIQUE: AP Portable chest xray FINDINGS: Lines/tubes/devices: none Lungs/pleura:  No pneumonia, pulmonary edema, or obvious mass. Lungs are mildly hyperinflated consistent with COPD. No pleural effusion. No pneumothorax. Heart: Heart size is normal. Mediastinum/shayan: No obvious mass or adenopathy. Skeleton: No significant bone or joint abnormality. No acute cardiopulmonary disease. COPD. **This report has been created using voice recognition software. It may contain minor errors which are inherent in voice recognition technology. ** Final report electronically signed by Dr. Mitali Be on 2/24/2020 6:05 AM    Electronically signed by AIDA Quintanilla CNP on 2/26/2020 at 11:26 AM

## 2020-02-26 NOTE — CONSULTS
-Stable on 4LPM- 91%  -OOB to chair  -SOB mainly with exertion- no CP  -Afebrile  -intermittent productive cough    -All other systems reviewed  Objective    Vitals    height is 6' (1.829 m) and weight is 204 lb 11.2 oz (92.9 kg). His oral temperature is 97.8 °F (36.6 °C). His blood pressure is 149/80 (abnormal) and his pulse is 101. His respiration is 22 and oxygen saturation is 91%. O2 Flow Rate (L/min): 5 L/min  I/O    Intake/Output Summary (Last 24 hours) at 2/26/2020 1229  Last data filed at 2/26/2020 1129  Gross per 24 hour   Intake 3744.89 ml   Output 1100 ml   Net 2644.89 ml     Patient Vitals for the past 96 hrs (Last 3 readings):   Weight   02/26/20 0445 204 lb 11.2 oz (92.9 kg)   02/25/20 0502 205 lb 4 oz (93.1 kg)   02/24/20 0745 207 lb 3.7 oz (94 kg)     Exam    Physical Exam  Constitutional: Patient appears moderately built and moderately nourished. Stable on 4LPM. OOB to chair  Neck: Neck supple. No JVD or tracheal deviation present. Cardiovascular: Regular rate, regular rhythm, S1 and S2 with no murmur. No peripheral edema  Pulmonary/Chest: Normal effort with clear breath sounds with slightly diminished BLL. No stridor. No respiratory distress. No wheezing/rales heard  Abdominal: Soft. Bowel sounds audible. No distension or tenderness to palp  Musculoskeletal: Moves all extremities; no clubbing or cyanosis  Lymphadenopathy:  No cervical adenopathy. Neurological: Patient is alert and oriented to person, place, and time. Skin: Skin is warm and dry; no bruising or bleeding.      Meds       albuterol  2.5 mg Nebulization Q4H WA    [START ON 2/27/2020] predniSONE  40 mg Oral Daily    chlorhexidine  15 mL Mouth/Throat BID    nicotine  1 patch Transdermal Daily    atorvastatin  40 mg Oral Daily    clopidogrel  75 mg Oral Daily    glycopyrrolate-formoterol  2 puff Inhalation BID    magnesium replacement protocol   Other RX Placeholder    enoxaparin  40 mg Subcutaneous Daily         Labs ABG  Lab Results   Component Value Date    PH 7.28 02/24/2020    PO2 82 02/24/2020    PCO2 52 02/24/2020    HCO3 24 02/24/2020    O2SAT 94 02/24/2020     No results found for: STACEY Green  CBC  Recent Labs     02/24/20  0544 02/25/20  0344 02/26/20  0546   WBC 15.4* 13.4* 13.0*   RBC 4.62* 3.65* 3.84*   HGB 14.1 11.4* 11.9*   HCT 43.9 35.6* 38.3*   MCV 95.0* 97.5* 99.7*   MCH 30.5 31.2 31.0   MCHC 32.1* 32.0* 31.1*   RDW 14.2  --   --     196 198   MPV 8.6 11.7 11.9      BMP  Recent Labs     02/24/20  0544 02/24/20  0926 02/25/20  0344 02/26/20  0546     --  139 146*   K 3.7  --  4.3 3.7     --  104 111   CO2 27  --  22* 20*   BUN 23*  --  18 18   CREATININE 1.7*  --  1.3* 1.1   GLUCOSE 163*  --  132* 136*   MG 1.7* 2.2 2.2  --    CALCIUM  --   --  8.1* 8.4*     LFT  Recent Labs     02/24/20  0544   AST 26   ALT 49   BILITOT 0.6   ALKPHOS 54     TROP  Lab Results   Component Value Date    TROPONINT < 0.010 02/24/2020     BNP  No results found for: PROBNP  D-Dimer  No results found for: DDIMER  Lactic Acid  No results for input(s): LACTA in the last 72 hours. INR  No results for input(s): INR, PROTIME in the last 72 hours. PTT  No results for input(s): APTT in the last 72 hours. Glucose  No results for input(s): POCGLU in the last 72 hours. UA No results for input(s): SPECGRAV, PHUR, COLORU, CLARITYU, MUCUS, PROTEINU, BLOODU, RBCUA, WBCUA, BACTERIA, NITRU, GLUCOSEU, BILIRUBINUR, UROBILINOGEN, KETUA, LABCAST, LABCASTTY, AMORPHOS in the last 72 hours. Invalid input(s): CRYSTALS. EKG     Echo   NA  Cultures    Procalcitonin   Lab Results   Component Value Date    PROCAL 0.06 02/24/2020   MRSA (-)  VRE (-)  Rapid Influenza A/B (-)  Respiratory Culture: (-)  Urine Culture: (-)    PFT   NA  Bed side spirometry:   Date performed: pending  FEV1: Measurement:                        % Predicted.   FEV1/FVC ratio :   FEF 25-75%:Measurement:                        % concerns addressed. Meds and Orders reviewed. Electronically signed by   AIDA Lawrence CNP on 2/26/2020 at 12:29 PM    Addendum by Dr. Emily Buitrago MD:  I have seen and examined the patient independently. Face to face evaluation and examination was performed. The above evaluation and note has been reviewed. Labs and radiographs were reviewed. I Have discussed with Ms. SHANTHI Lawrence CNP about this patient in detail. The above assessment and plan has been reviewed. Please see my modifications mentioned below. My modifications:  Chest Xray: Feb 24, 2020      Plan;  Follow bed side spirometry. Follow AM CXR. He quit smoking tobacco.    CTA of chest:  Jan 6, 2020   PROCEDURE: CTA CHEST W WO CONTRAST   1. Negative exam for pulmonary artery embolus. 2. Emphysematous lung changes. 3. Cholelithiasis.        Johnnie Armstrong MD 2/26/2020 5:51 PM

## 2020-02-26 NOTE — PLAN OF CARE
Problem: Impaired respiratory status  Goal: Will be able to breathe spontaneously, without ventilator support  Description  Will be able to breathe spontaneously, without ventilator support     2/25/2020 1304 by [facility], RCP  Outcome: Met This Shift  Note:   Pt weaned and extubated. Pt francisco javier 3lpm nc. Problem: Falls - Risk of:  Goal: Will remain free from falls  Description  Will remain free from falls  Outcome: Met This Shift  Note:   No falls. Bed alarm on. Pt was on sedation and vent. Extubated but still sleepy. Problem: Falls - Risk of:  Goal: Absence of physical injury  Description  Absence of physical injury  Outcome: Met This Shift     Problem: Nutrition  Goal: Optimal nutrition therapy  Outcome: Ongoing  Note:   Started on clear liquids and tolerating     Problem: Risk for Impaired Skin Integrity  Goal: Tissue integrity - skin and mucous membranes  Description  Structural intactness and normal physiological function of skin and  mucous membranes. Outcome: Ongoing  Note:   No new skin issues. Has a canker sore inside right side of mouth. Order for peridex. Problem: Discharge Planning:  Goal: Discharged to appropriate level of care  Description  Discharged to appropriate level of care  Outcome: Ongoing  Note:   Transferred to stepdown today. Plan to go back home with wife at discharge. Problem: OXYGENATION/RESPIRATORY FUNCTION  Goal: Ability to maintain adequate oxygenation will improve  Description  Ability to maintain adequate oxygenation will improve     Outcome: Ongoing  Note:   Extubated today. Post extubation on 4L/NC. Saturations 92-93%. Has a strong cough and able to cough up secretions. Problem: Restraint Use - Nonviolent/Non-Self-Destructive Behavior:  Goal: Absence of restraint indications  Description  Absence of restraint indications  Outcome: Completed  Note:   Pt was extubated and off sedation. Not trying to pull at lines or tubes.   Goal: Absence of

## 2020-02-26 NOTE — PROGRESS NOTES
later time d/t fatigue, pt verbalized understanding    Also educated pt on EC strategies with handout issued, reviewed examples with pt and pt verbalized understanding. Discharge Recommendations:  Continue to assess pending progress, Home with assist PRN    Patient Education:  OT Education: Plan of Care, Energy Conservation, OT Role, ADL Adaptive Strategies    Equipment Recommendations:  Equipment Needed: No    Plan:  Times per week: 3-5X  Current Treatment Recommendations: Strengthening, Balance Training, Functional Mobility Training, Endurance Training, Patient/Caregiver Education & Training, Self-Care / ADL, Safety Education & Training    Goals:  Patient goals : to return home  Short term goals  Time Frame for Short term goals: by discharge  Short term goal 1: Pt will complete functional mobility to/from bathroom with S and no vc for breathing technique or EC prn for ease with toileting routine  Short term goal 2: Pt will complete BADL/IADL task with S and no vc for EC strategies prn for ease with bathing and IADLs at home  Short term goal 3: Pt will demo indep with BUE strengthening HEP with no vc for breathing technique to increase UB strength and activity tolerance needed for IADLs  Long term goals  Time Frame for Long term goals : no LTG established d/t short ELOS  See long-term goal time frame for expected duration of plan of care. If no long-term goals established, a short length of stay is anticipated. Following session, patient left in safe position with all fall risk precautions in place.

## 2020-02-26 NOTE — PLAN OF CARE
Problem: RESPIRATORY  Goal: Lung sounds clear or within normal limits for patient  2/26/2020 4423 by Asad Becerril RCP  Outcome: Ongoing

## 2020-02-27 ENCOUNTER — APPOINTMENT (OUTPATIENT)
Dept: GENERAL RADIOLOGY | Age: 70
DRG: 208 | End: 2020-02-27
Payer: MEDICARE

## 2020-02-27 VITALS
SYSTOLIC BLOOD PRESSURE: 121 MMHG | WEIGHT: 205 LBS | RESPIRATION RATE: 18 BRPM | DIASTOLIC BLOOD PRESSURE: 72 MMHG | HEIGHT: 72 IN | TEMPERATURE: 98.2 F | BODY MASS INDEX: 27.77 KG/M2 | OXYGEN SATURATION: 90 % | HEART RATE: 80 BPM

## 2020-02-27 LAB
ANION GAP SERPL CALCULATED.3IONS-SCNC: 13 MEQ/L (ref 8–16)
BUN BLDV-MCNC: 14 MG/DL (ref 7–22)
CALCIUM SERPL-MCNC: 8.3 MG/DL (ref 8.5–10.5)
CHLORIDE BLD-SCNC: 109 MEQ/L (ref 98–111)
CO2: 24 MEQ/L (ref 23–33)
CREAT SERPL-MCNC: 1.2 MG/DL (ref 0.4–1.2)
ERYTHROCYTE [DISTWIDTH] IN BLOOD BY AUTOMATED COUNT: 14.6 % (ref 11.5–14.5)
ERYTHROCYTE [DISTWIDTH] IN BLOOD BY AUTOMATED COUNT: 51.6 FL (ref 35–45)
GFR SERPL CREATININE-BSD FRML MDRD: 60 ML/MIN/1.73M2
GLUCOSE BLD-MCNC: 84 MG/DL (ref 70–108)
HCT VFR BLD CALC: 35.5 % (ref 42–52)
HEMOGLOBIN: 11.4 GM/DL (ref 14–18)
MCH RBC QN AUTO: 31.1 PG (ref 26–33)
MCHC RBC AUTO-ENTMCNC: 32.1 GM/DL (ref 32.2–35.5)
MCV RBC AUTO: 96.7 FL (ref 80–94)
PLATELET # BLD: 197 THOU/MM3 (ref 130–400)
PMV BLD AUTO: 11.8 FL (ref 9.4–12.4)
POTASSIUM SERPL-SCNC: 3.9 MEQ/L (ref 3.5–5.2)
RBC # BLD: 3.67 MILL/MM3 (ref 4.7–6.1)
SODIUM BLD-SCNC: 146 MEQ/L (ref 135–145)
WBC # BLD: 10.2 THOU/MM3 (ref 4.8–10.8)

## 2020-02-27 PROCEDURE — 6370000000 HC RX 637 (ALT 250 FOR IP): Performed by: NURSE PRACTITIONER

## 2020-02-27 PROCEDURE — 94010 BREATHING CAPACITY TEST: CPT

## 2020-02-27 PROCEDURE — 2700000000 HC OXYGEN THERAPY PER DAY

## 2020-02-27 PROCEDURE — 36415 COLL VENOUS BLD VENIPUNCTURE: CPT

## 2020-02-27 PROCEDURE — 94640 AIRWAY INHALATION TREATMENT: CPT

## 2020-02-27 PROCEDURE — 99238 HOSP IP/OBS DSCHRG MGMT 30/<: CPT | Performed by: NURSE PRACTITIONER

## 2020-02-27 PROCEDURE — 99232 SBSQ HOSP IP/OBS MODERATE 35: CPT | Performed by: INTERNAL MEDICINE

## 2020-02-27 PROCEDURE — 80048 BASIC METABOLIC PNL TOTAL CA: CPT

## 2020-02-27 PROCEDURE — APPSS30 APP SPLIT SHARED TIME 16-30 MINUTES: Performed by: NURSE PRACTITIONER

## 2020-02-27 PROCEDURE — 85027 COMPLETE CBC AUTOMATED: CPT

## 2020-02-27 PROCEDURE — 94761 N-INVAS EAR/PLS OXIMETRY MLT: CPT

## 2020-02-27 PROCEDURE — 6360000002 HC RX W HCPCS: Performed by: NURSE PRACTITIONER

## 2020-02-27 PROCEDURE — 71046 X-RAY EXAM CHEST 2 VIEWS: CPT

## 2020-02-27 RX ORDER — NICOTINE 21 MG/24HR
1 PATCH, TRANSDERMAL 24 HOURS TRANSDERMAL DAILY
Qty: 30 PATCH | Refills: 0 | Status: ON HOLD | OUTPATIENT
Start: 2020-02-28 | End: 2020-07-06

## 2020-02-27 RX ORDER — ALLOPURINOL 300 MG/1
300 TABLET ORAL DAILY
Status: DISCONTINUED | OUTPATIENT
Start: 2020-02-27 | End: 2020-02-27 | Stop reason: HOSPADM

## 2020-02-27 RX ORDER — METOPROLOL SUCCINATE 25 MG/1
25 TABLET, EXTENDED RELEASE ORAL DAILY
Status: DISCONTINUED | OUTPATIENT
Start: 2020-02-27 | End: 2020-02-27 | Stop reason: HOSPADM

## 2020-02-27 RX ORDER — PREDNISONE 20 MG/1
40 TABLET ORAL DAILY
Qty: 8 TABLET | Refills: 0 | Status: SHIPPED | OUTPATIENT
Start: 2020-02-28 | End: 2020-03-03

## 2020-02-27 RX ADMIN — GLYCOPYRROLATE AND FORMOTEROL FUMARATE 2 PUFF: 9; 4.8 AEROSOL, METERED RESPIRATORY (INHALATION) at 09:47

## 2020-02-27 RX ADMIN — Medication 15 ML: at 08:51

## 2020-02-27 RX ADMIN — LISINOPRIL 10 MG: 10 TABLET ORAL at 08:52

## 2020-02-27 RX ADMIN — ENOXAPARIN SODIUM 40 MG: 40 INJECTION SUBCUTANEOUS at 08:53

## 2020-02-27 RX ADMIN — CLOPIDOGREL BISULFATE 75 MG: 75 TABLET ORAL at 08:52

## 2020-02-27 RX ADMIN — PREDNISONE 40 MG: 20 TABLET ORAL at 08:52

## 2020-02-27 RX ADMIN — ALBUTEROL SULFATE 2.5 MG: 2.5 SOLUTION RESPIRATORY (INHALATION) at 09:47

## 2020-02-27 RX ADMIN — ALLOPURINOL 300 MG: 300 TABLET ORAL at 10:49

## 2020-02-27 RX ADMIN — ATORVASTATIN CALCIUM 40 MG: 40 TABLET, FILM COATED ORAL at 08:53

## 2020-02-27 RX ADMIN — METOPROLOL SUCCINATE 25 MG: 25 TABLET, FILM COATED, EXTENDED RELEASE ORAL at 10:49

## 2020-02-27 RX ADMIN — ALBUTEROL SULFATE 2.5 MG: 2.5 SOLUTION RESPIRATORY (INHALATION) at 12:54

## 2020-02-27 RX ADMIN — HYDROCHLOROTHIAZIDE 12.5 MG: 25 TABLET ORAL at 08:51

## 2020-02-27 ASSESSMENT — PAIN SCALES - GENERAL: PAINLEVEL_OUTOF10: 0

## 2020-02-27 NOTE — PROGRESS NOTES
Discharge teaching and instructions for diagnosis/procedure of acute respiratory failure completed with patient using teachback method. AVS reviewed. Patient voiced understanding regarding prescriptions, follow up appointments, and care of self at home. Discharged in a wheelchair to  home with support per family. Home O2 tank delivered. Meds to beds delivered. Patient IV taken out. Patient heart monitor taken off. Patient discharged with documented belongings.

## 2020-02-27 NOTE — DISCHARGE SUMMARY
Hospitalist Discharge Summary        Patient: Arleen De La Rosa  YOB: 1950  MRN: [id number]   Acct: [de-identified]    Primary Care Physician: Bernard Larsen date  2/24/2020    Discharge date:  2/27/2020 12:15 PM    Chief Complaint on presentation :-  Respiratory distress    Discharge Assessment and Plan:-   1. Acute hypercapnic/hypoxic respiratory failure: Improved. Home oxygen evaluation conducted, patient currently requires 2L of oxygen via nasal cannula at rest with 3L upon exertion. 2. COPD, acute exacerbation: Resolving. Pneumonia not likely. Continue albuterol and prednisone at discharge. Incentive spirometry also encouraged at discharge. Follow-up with pulmonology outpatient for PFT after discharge. 3. Leukocytosis: Resolved. Current WBC count 10.2, down from 15.4 on 2/24. 4. Acute kidney injury on chronic kidney disease, stage III: Resolved. Creatinine normalized to 1.2.  5. Hypotension: Resolved. Leocadia Rumpf secondary to sedation medications. 6. Acute normocytic anemia: Stable. Current H&H 11.4/35.5 with an MCV of 96.7. No overt signs of bleeding at this time. 7. Hypomagnesemia: Resolved. 8. Benign essential hypertension: History, stable. Continue lisinopril, hydrochlorothiazide, and metoprolol at discharge. 9. Coronary artery disease: History. Continue Plavix at discharge. 10. Hyperlipidemia: History. Continue atorvastatin at discharge. 11. Gout: History. Continue allopurinol at discharge. 12. Tobacco abuse: Patient counseled on the importance of smoking cessation. Continue NRT therapy at discharge. Initial H and P and Hospital course:-  Eleanor Torres is a 44-year-old  male, daily smoker, with a medical history of hypertension, gout, elevated PSA, prostate cancer in 2017, and arthritis.   Patient presents [facility] from [facility] ambulatory care center with complaints of shortness of breath that has been going on for the past time.    Physical Exam:-  Vitals:   Patient Vitals for the past 24 hrs:   BP Temp Temp src Pulse Resp SpO2 Weight   02/27/20 1211 121/72 98.2 °F (36.8 °C) Oral 80 18 90 % --   02/27/20 0947 -- -- -- -- -- 90 % --   02/27/20 0841 (!) 136/101 98.1 °F (36.7 °C) Oral 88 17 90 % --   02/27/20 0512 130/76 98 °F (36.7 °C) Oral 85 18 90 % 205 lb (93 kg)   02/27/20 0000 -- -- -- -- -- 90 % --   02/26/20 2353 (!) 107/57 99.6 °F (37.6 °C) Oral 81 16 (!) 88 % --   02/26/20 2116 -- -- -- -- 20 92 % --   02/26/20 2104 137/80 99.3 °F (37.4 °C) Oral 98 18 93 % --   02/26/20 1549 136/67 98.7 °F (37.1 °C) Oral 88 18 91 % --     Weight:   Weight: 205 lb (93 kg)   24 hour intake/output:     Intake/Output Summary (Last 24 hours) at 2/27/2020 1215  Last data filed at 2/27/2020 9327  Gross per 24 hour   Intake 240 ml   Output 0 ml   Net 240 ml     General appearance: Alert and appropriate, pleasant geriatric male. No apparent distress, appears stated age and cooperative. HEENT: Pupils equal, round, and reactive to light. Conjunctivae/corneas clear. Neck: Supple, with full range of motion. No jugular venous distention. Trachea midline. Respiratory:  Normal respiratory effort. Mild expiratory wheezing auscultated bilaterally. Cardiovascular: Regular rate and rhythm with normal S1/S2 without murmurs, rubs or gallops. Abdomen: Soft, non-tender, non-distended with normal bowel sounds. Musculoskeletal: Passive and active ROM x 4 extremities. Skin: Skin color, texture, turgor normal.  No rashes or lesions. Neurologic:  Neurovascularly intact without any focal sensory/motor deficits. Cranial nerves: II-XII intact, grossly non-focal.  Psychiatric: Alert and oriented to person, place, time, and situation.  Thought content appropriate, normal insight  Capillary Refill: Brisk,< 3 seconds   Peripheral Pulses: +2 palpable, equal bilaterally     Discharge Medications:-      Medication List      START taking these medications    Tiotropium MCH 31.2 26.0 - 33.0 pg    MCHC 32.0 (L) 32.2 - 35.5 gm/dl    RDW-CV 14.4 11.5 - 14.5 %    RDW-SD 51.4 (H) 35.0 - 45.0 fL    Platelets 876 282 - 947 thou/mm3    MPV 11.7 9.4 - 12.4 fL    Seg Neutrophils 81.4 %    Lymphocytes 7.3 %    Monocytes 9.8 %    Eosinophils 1.0 %    Basophils 0.1 %    Immature Granulocytes 0.4 %    Segs Absolute 10.9 (H) 1.8 - 7.7 thou/mm3    Lymphocytes Absolute 1.0 1.0 - 4.8 thou/mm3    Monocytes Absolute 1.3 0.4 - 1.3 thou/mm3    Eosinophils Absolute 0.1 0.0 - 0.4 thou/mm3    Basophils Absolute 0.0 0.0 - 0.1 thou/mm3    Immature Grans (Abs) 0.06 0.00 - 0.07 thou/mm3    nRBC 0 /100 wbc   Basic Metabolic Panel    Collection Time: 02/25/20  3:44 AM   Result Value Ref Range    Sodium 139 135 - 145 meq/L    Potassium 4.3 3.5 - 5.2 meq/L    Chloride 104 98 - 111 meq/L    CO2 22 (L) 23 - 33 meq/L    Glucose 132 (H) 70 - 108 mg/dL    BUN 18 7 - 22 mg/dL    CREATININE 1.3 (H) 0.4 - 1.2 mg/dL    Calcium 8.1 (L) 8.5 - 10.5 mg/dL   Magnesium    Collection Time: 02/25/20  3:44 AM   Result Value Ref Range    Magnesium 2.2 1.6 - 2.4 mg/dL   Anion Gap    Collection Time: 02/25/20  3:44 AM   Result Value Ref Range    Anion Gap 13.0 8.0 - 16.0 meq/L   Glomerular Filtration Rate, Estimated    Collection Time: 02/25/20  3:44 AM   Result Value Ref Range    Est, Glom Filt Rate 55 (A) HW/OCE/5.69Y5   Basic Metabolic Panel    Collection Time: 02/26/20  5:46 AM   Result Value Ref Range    Sodium 146 (H) 135 - 145 meq/L    Potassium 3.7 3.5 - 5.2 meq/L    Chloride 111 98 - 111 meq/L    CO2 20 (L) 23 - 33 meq/L    Glucose 136 (H) 70 - 108 mg/dL    BUN 18 7 - 22 mg/dL    CREATININE 1.1 0.4 - 1.2 mg/dL    Calcium 8.4 (L) 8.5 - 10.5 mg/dL   CBC    Collection Time: 02/26/20  5:46 AM   Result Value Ref Range    WBC 13.0 (H) 4.8 - 10.8 thou/mm3    RBC 3.84 (L) 4.70 - 6.10 mill/mm3    Hemoglobin 11.9 (L) 14.0 - 18.0 gm/dl    Hematocrit 38.3 (L) 42.0 - 52.0 %    MCV 99.7 (H) 80.0 - 94.0 fL    MCH 31.0 26.0 - 33.0 pg above the shelton at the thoracic inlet. Lungs/pleura:  No pneumonia, pulmonary edema, or obvious mass. There is mild right basilar atelectasis. Costophrenic angles are clipped however there is no significant pleural effusion. No pneumothorax. Heart: Heart size is normal. Mediastinum/shayan: No obvious mass or adenopathy. Skeleton: No significant bone or joint abnormality. ET tube tip 7.5 cm above the shelton at the thoracic inlet. No acute pneumonia. New mild right basilar atelectasis. **This report has been created using voice recognition software. It may contain minor errors which are inherent in voice recognition technology. ** Final report electronically signed by Dr. Clifton Coello on 2/24/2020 6:54 AM    Xr Chest Portable    Result Date: 2/24/2020  PROCEDURE: XR CHEST PORTABLE CLINICAL INFORMATION: dyspnea. COMPARISON: Chest x-ray dated 1/6/2020 TECHNIQUE: AP Portable chest xray FINDINGS: Lines/tubes/devices: none Lungs/pleura:  No pneumonia, pulmonary edema, or obvious mass. Lungs are mildly hyperinflated consistent with COPD. No pleural effusion. No pneumothorax. Heart: Heart size is normal. Mediastinum/shayan: No obvious mass or adenopathy. Skeleton: No significant bone or joint abnormality. No acute cardiopulmonary disease. COPD. **This report has been created using voice recognition software. It may contain minor errors which are inherent in voice recognition technology. ** Final report electronically signed by Dr. Clifton Coello on 2/24/2020 6:05 AM       Follow-up scheduled after discharge :-    tomorrow with Quoctamerajob Polo  On 5/22/2020 with Mp Semen.  Tia Dad, APRN-CNP  [facility] Pulm Function Test on 3/5/2020 at 0800    Consultations during this hospital stay:-  [] NONE [] Cardiology  [] Nephrology  [] Hemo onco   [] GI   [] ID  [] Endocrine  [x] Pulm    [] Neuro    [] Psych   [] Urology  [] ENT   [] G SURGERY   []Ortho    []CV surg    [] Palliative  [] Hospice [] Pain management   []

## 2020-02-27 NOTE — PROCEDURES
Bedside spirometry completed at this time. Pt sitting up in chair for test. Pt tolerated well. Results put in chart.

## 2020-02-27 NOTE — PROGRESS NOTES
Patient was evaluated today for the diagnosis of COPD. I entered a DME order for home oxygen because the diagnosis and testing requires the patient to have supplemental oxygen. Condition will improve or be benefited by oxygen use. The patient is  able to perform good mobility in a home setting and therefore does require the use of a portable oxygen system. The need for this equipment was discussed with the patient and he understands and is in agreement. 2LPM at rest  3LPM with activity    2/27/20  Audrey Kim RCP   Patient was placed on room air for 10 minutes. SpO2 was 86 % on room air at rest. Patients SpO2 was below 89% and qualified for home oxygen. Oxygen was applied at 2 lpm via nasal cannula to maintain a SpO2 between 90-92% while at rest. Actual SpO2 was 90-91 %. Patient can ambulate for exercise flow rate. Patients was ambulated, SpO2 was 90-91% on 3 lpm to maintain SpO2 between 90-92% while exercising. Results given to Korey Baldwin [facility].     Electronically signed by AIDA London CNP on 2/27/2020 at 11:28 AM

## 2020-02-27 NOTE — PROGRESS NOTES
A home oxygen evaluation has been completed. [x]Patient is an inpatient. It is expected that the patient will be discharged within the next 48 hours. Qualified provider to write order for home prescription if patient qualifies. Social service/care managers will arrange for home oxygen. If patient is active, arrange for Home Medical supplier to assess for Oxygen Conserving Device per pulse oximetry. []Patient is an outpatient. Results will be faxed to the ordering provider. Qualified provider to write order for home prescription if patient qualifies and arranges for home oxygen. Patient was placed on room air for 10 minutes. SpO2 was 86 % on room air at rest. Patients SpO2 was below 89% and qualified for home oxygen. Oxygen was applied at 2 lpm via nasal cannula to maintain a SpO2 between 90-92% while at rest. Actual SpO2 was 90-91 %. Patient can ambulate for exercise flow rate. Patients was ambulated, SpO2 was 90-91% on 3 lpm to maintain SpO2 between 90-92% while exercising. Results given to Huang Salas, [facility]. Note: For any SpO2 at 27% see policy and procedure for possible qualifications.

## 2020-02-27 NOTE — CARE COORDINATION
Update: called Sonam Serrano RCP re: home oxygen eval  Electronically signed by Mag Moreno RN on 2/27/2020 at 8:17 AM    Update: oxygen eval = 2L at rest, 3L with activity; Alexis Dan, Pulmonary CNP will place orders and smart phrase, collaborated with Carmen Truong RN  Electronically signed by Mag Moreno RN on 2/27/2020 at 11:20 AM    2/27/20, 10:46 AM  Client plans home with spouse when medically cleared and prefers new [facility] (after list of choices offered; called Paige Winkler), therapies recommend home at discharge, Pulmonary Rehab ordered by Pulmonary  Patient goals/plan/ treatment preferences discussed by  and . Patient goals/plan/ treatment preferences reviewed with patient/ family. Patient/ family verbalize understanding of discharge plan and are in agreement with goal/plan/treatment preferences. Understanding was demonstrated using the teach back method. AVS provided by RN at time of discharge, which includes all necessary medical information pertaining to the patients current course of illness, treatment, post-discharge goals of care, and treatment preferences.

## 2020-03-05 NOTE — PROGRESS NOTES
CLINICAL PHARMACY NOTE: MEDS TO [address] Select Patient?: No  Total # of Prescriptions Filled: 2   The following medications were delivered to the patient:  Prednisone 20mg  Stiolto Respimat 2.5mcg  Total # of Interventions Completed: 2  Time Spent (min): 30    Additional Documentation:

## 2020-03-10 ENCOUNTER — HOSPITAL ENCOUNTER (OUTPATIENT)
Dept: PULMONOLOGY | Age: 70
Discharge: HOME | End: 2020-03-10
Payer: MEDICARE

## 2020-03-10 PROCEDURE — 94729 DIFFUSING CAPACITY: CPT

## 2020-03-10 PROCEDURE — 94726 PLETHYSMOGRAPHY LUNG VOLUMES: CPT

## 2020-03-10 PROCEDURE — 94060 EVALUATION OF WHEEZING: CPT

## 2020-05-21 ENCOUNTER — OFFICE VISIT (OUTPATIENT)
Dept: PULMONOLOGY | Age: 70
End: 2020-05-21
Payer: MEDICARE

## 2020-05-21 VITALS
HEART RATE: 72 BPM | TEMPERATURE: 98.1 F | DIASTOLIC BLOOD PRESSURE: 70 MMHG | HEIGHT: 72 IN | OXYGEN SATURATION: 97 % | BODY MASS INDEX: 29.8 KG/M2 | WEIGHT: 220 LBS | SYSTOLIC BLOOD PRESSURE: 110 MMHG

## 2020-05-21 PROCEDURE — 94618 PULMONARY STRESS TESTING: CPT | Performed by: NURSE PRACTITIONER

## 2020-05-21 PROCEDURE — 3017F COLORECTAL CA SCREEN DOC REV: CPT | Performed by: NURSE PRACTITIONER

## 2020-05-21 PROCEDURE — 1123F ACP DISCUSS/DSCN MKR DOCD: CPT | Performed by: NURSE PRACTITIONER

## 2020-05-21 PROCEDURE — 3023F SPIROM DOC REV: CPT | Performed by: NURSE PRACTITIONER

## 2020-05-21 PROCEDURE — 1036F TOBACCO NON-USER: CPT | Performed by: NURSE PRACTITIONER

## 2020-05-21 PROCEDURE — G8427 DOCREV CUR MEDS BY ELIG CLIN: HCPCS | Performed by: NURSE PRACTITIONER

## 2020-05-21 PROCEDURE — 99214 OFFICE O/P EST MOD 30 MIN: CPT | Performed by: NURSE PRACTITIONER

## 2020-05-21 PROCEDURE — G8926 SPIRO NO PERF OR DOC: HCPCS | Performed by: NURSE PRACTITIONER

## 2020-05-21 PROCEDURE — 4040F PNEUMOC VAC/ADMIN/RCVD: CPT | Performed by: NURSE PRACTITIONER

## 2020-05-21 PROCEDURE — G8417 CALC BMI ABV UP PARAM F/U: HCPCS | Performed by: NURSE PRACTITIONER

## 2020-05-21 RX ORDER — OMEPRAZOLE 20 MG/1
20 CAPSULE, DELAYED RELEASE ORAL
Qty: 90 CAPSULE | Refills: 3 | Status: SHIPPED | OUTPATIENT
Start: 2020-05-21 | End: 2021-05-12

## 2020-05-21 ASSESSMENT — ENCOUNTER SYMPTOMS
WHEEZING: 0
STRIDOR: 0
VOMITING: 0
GASTROINTESTINAL NEGATIVE: 1
ALLERGIC/IMMUNOLOGIC NEGATIVE: 1
CHEST TIGHTNESS: 0
DIARRHEA: 0
NAUSEA: 0
EYES NEGATIVE: 1
RESPIRATORY NEGATIVE: 1

## 2020-05-21 NOTE — PROGRESS NOTES
route once for 1 dose 1 kit 0     No current facility-administered medications for this visit. ROS   Review of Systems   Constitutional: Negative. Negative for chills, fever and unexpected weight change. HENT: Negative. Eyes: Negative. Respiratory: Negative. Negative for chest tightness, wheezing and stridor. Cardiovascular: Negative for chest pain and leg swelling. Gastrointestinal: Negative. Negative for diarrhea, nausea and vomiting. Endocrine: Negative. Genitourinary: Negative. Negative for dysuria. Musculoskeletal: Negative. Skin: Negative. Allergic/Immunologic: Negative. Neurological: Negative. Hematological: Negative. Psychiatric/Behavioral: Negative. Physical exam   /70 (Site: Left Upper Arm, Position: Sitting, Cuff Size: Large Adult)   Pulse 72   Temp 98.1 °F (36.7 °C) (Oral)   Ht 6' (1.829 m)   Wt 220 lb (99.8 kg)   SpO2 97% Comment: room air  BMI 29.84 kg/m²    Wt Readings from Last 3 Encounters:   20 220 lb (99.8 kg)   20 205 lb (93 kg)   20 200 lb (90.7 kg)       Physical Exam  Vitals signs and nursing note reviewed. Constitutional:       General: He is not in acute distress. Appearance: He is well-developed. HENT:      Head: Normocephalic and atraumatic. Neck:      Trachea: No tracheal deviation. Cardiovascular:      Rate and Rhythm: Normal rate and regular rhythm. Heart sounds: Normal heart sounds. No murmur. Pulmonary:      Effort: Pulmonary effort is normal. No respiratory distress. Breath sounds: Normal breath sounds. No stridor. No wheezing or rales. Chest:      Chest wall: No tenderness. Abdominal:      General: Bowel sounds are normal. There is no distension. Palpations: Abdomen is soft. Musculoskeletal:      Right lower le+ Edema present. Left lower le+ Edema present. Skin:     General: Skin is warm and dry.       Capillary Refill: Capillary refill takes less rest or activity   Assessment      Diagnosis Orders   1. COPD, moderate (HCC)  Alpha-1-Antitrypsin    6 Minute Walk Test    Pulse oximetry, overnight    Full PFT Study With Bronchodilator   2. Hospital discharge follow-up     3. Personal history of tobacco use  CT LUNG SCREEN [Initial/Annual]    Full PFT Study With Bronchodilator   4. Gastroesophageal reflux disease, esophagitis presence not specified  omeprazole (PRILOSEC) 20 MG delayed release capsule         Plan   -A1A today in the office- will notify patient if abnormal result  -Will continue patient on Stiolto Respimat ( Tiotropium bromide and Olodaterol) 2.5mcg/2.5mcg per actuation 2 puffs once daily. He was detailed about mechanism of action of drug along with associated side effects. He agreed to take the risk and medication. He verbalizes understanding. Patient educated and demonstrated in my office how to use above inhaler. Patient verbalizes understanding.   -RX for Albuterol MDI PRN for SOB/wheezing to have with him  -Continue Albuterol neb PRN for SOB/wheezing instructed not to use the Albuterol within 2 hours of each other  -Advised to maintain pneumonia vaccine with PCP and to take flu vaccine this coming season.  -Advised patient to call office with any changes, questions, or concerns regarding respiratory status  -6MWT done today - not requiring any O2 supplementation  -Will do nocturnal pusle ox on room air   -Prilosec 20 mg PO daily RX done   -Encouraged to remain nicotine free     Will see Alicia Valadez back in: Feb, 2021 with LDCT/PFT prior    Negra Yeung [facility]  5/21/2020

## 2020-05-26 ENCOUNTER — TELEPHONE (OUTPATIENT)
Dept: PULMONOLOGY | Age: 70
End: 2020-05-26

## 2020-05-26 NOTE — TELEPHONE ENCOUNTER
Mariusz Cabrera called questioning about removing his oxygen. You told him not to use it anymore, but no order was placed for removal. I explained to patient that you may have wanted to wait until after overnight pulse Ox was done. He is scheduled for 6-2-20 for that.

## 2020-05-29 ENCOUNTER — TELEPHONE (OUTPATIENT)
Dept: PULMONOLOGY | Age: 70
End: 2020-05-29

## 2020-06-02 ENCOUNTER — HOSPITAL ENCOUNTER (OUTPATIENT)
Dept: RESPIRATORY THERAPY | Age: 70
Discharge: HOME | End: 2020-06-02
Payer: MEDICARE

## 2020-06-02 PROCEDURE — 94762 N-INVAS EAR/PLS OXIMTRY CONT: CPT

## 2020-06-08 ENCOUNTER — TELEPHONE (OUTPATIENT)
Dept: PULMONOLOGY | Age: 70
End: 2020-06-08

## 2020-06-10 ENCOUNTER — TELEPHONE (OUTPATIENT)
Dept: PULMONOLOGY | Age: 70
End: 2020-06-10

## 2020-07-05 ENCOUNTER — HOSPITAL ENCOUNTER (INPATIENT)
Age: 70
LOS: 2 days | Discharge: HOME | DRG: 191 | End: 2020-07-07
Attending: EMERGENCY MEDICINE | Admitting: INTERNAL MEDICINE
Payer: MEDICARE

## 2020-07-05 ENCOUNTER — APPOINTMENT (OUTPATIENT)
Dept: GENERAL RADIOLOGY | Age: 70
DRG: 191 | End: 2020-07-05
Payer: MEDICARE

## 2020-07-05 PROBLEM — J96.21 ACUTE ON CHRONIC RESPIRATORY FAILURE WITH HYPOXIA (HCC): Status: ACTIVE | Noted: 2020-07-05

## 2020-07-05 PROBLEM — J44.1 COPD EXACERBATION (HCC): Status: ACTIVE | Noted: 2020-07-05

## 2020-07-05 LAB
ALBUMIN SERPL-MCNC: 3.7 GM/DL (ref 3.4–5)
ALP BLD-CCNC: 58 U/L (ref 46–116)
ALT SERPL-CCNC: 57 U/L (ref 14–63)
ANION GAP: 12 MEQ/L (ref 8–16)
AST SERPL-CCNC: 20 U/L (ref 15–37)
BASOPHILS # BLD: 0.5 % (ref 0–3)
BILIRUB SERPL-MCNC: 0.3 MG/DL (ref 0.2–1)
BUN BLDV-MCNC: 17 MG/DL (ref 7–18)
CHLORIDE BLD-SCNC: 105 MEQ/L (ref 98–107)
CO2: 27 MEQ/L (ref 21–32)
CREAT SERPL-MCNC: 1.6 MG/DL (ref 0.6–1.3)
EKG ATRIAL RATE: 99 BPM
EKG P AXIS: 81 DEGREES
EKG P-R INTERVAL: 136 MS
EKG Q-T INTERVAL: 348 MS
EKG QRS DURATION: 98 MS
EKG QTC CALCULATION (BAZETT): 446 MS
EKG R AXIS: 71 DEGREES
EKG T AXIS: 70 DEGREES
EKG VENTRICULAR RATE: 99 BPM
EOSINOPHILS RELATIVE PERCENT: 6.7 % (ref 0–4)
FLU A ANTIGEN: NEGATIVE
FLU B ANTIGEN: NEGATIVE
GFR, ESTIMATED: 46 ML/MIN/1.73M2
GLUCOSE BLD-MCNC: 113 MG/DL (ref 74–106)
HCT VFR BLD CALC: 44.2 % (ref 42–52)
HEMOGLOBIN: 14.6 GM/DL (ref 14–18)
LACTIC ACID: 1.4 MMOL/L (ref 0.5–2.2)
LYMPHOCYTES # BLD: 12.8 % (ref 15–47)
MCH RBC QN AUTO: 30.5 PG (ref 27–31)
MCHC RBC AUTO-ENTMCNC: 33.1 GM/DL (ref 33–37)
MCV RBC AUTO: 92.2 FL (ref 80–94)
MONOCYTES: 7.6 % (ref 0–12)
NT PRO BNP: 69 PG/ML (ref 0–900)
PDW BLD-RTO: 14.4 % (ref 11.5–14.5)
PLATELET # BLD: 246 THOU/MM3 (ref 130–400)
PMV BLD AUTO: 8.8 FL (ref 7.4–10.4)
POC CALCIUM: 8.6 MG/DL (ref 8.5–10.1)
POTASSIUM SERPL-SCNC: 4.2 MEQ/L (ref 3.5–5.1)
RBC # BLD: 4.79 MILL/MM3 (ref 4.7–6.1)
SARS-COV-2, NAAT: NOT DETECTED
SEGS: 72.4 % (ref 43–75)
SODIUM BLD-SCNC: 144 MEQ/L (ref 136–145)
TOTAL PROTEIN: 7 GM/DL (ref 6.4–8.2)
TROPONIN I: < 0.017 NG/ML (ref 0.02–0.05)
WBC # BLD: 11 THOU/MM3 (ref 4.8–10.8)

## 2020-07-05 PROCEDURE — 83605 ASSAY OF LACTIC ACID: CPT

## 2020-07-05 PROCEDURE — 87040 BLOOD CULTURE FOR BACTERIA: CPT

## 2020-07-05 PROCEDURE — 84484 ASSAY OF TROPONIN QUANT: CPT

## 2020-07-05 PROCEDURE — 6360000002 HC RX W HCPCS: Performed by: EMERGENCY MEDICINE

## 2020-07-05 PROCEDURE — 87804 INFLUENZA ASSAY W/OPTIC: CPT

## 2020-07-05 PROCEDURE — 2580000003 HC RX 258: Performed by: NURSE PRACTITIONER

## 2020-07-05 PROCEDURE — 93005 ELECTROCARDIOGRAM TRACING: CPT | Performed by: EMERGENCY MEDICINE

## 2020-07-05 PROCEDURE — 6370000000 HC RX 637 (ALT 250 FOR IP): Performed by: NURSE PRACTITIONER

## 2020-07-05 PROCEDURE — 94640 AIRWAY INHALATION TREATMENT: CPT

## 2020-07-05 PROCEDURE — 83880 ASSAY OF NATRIURETIC PEPTIDE: CPT

## 2020-07-05 PROCEDURE — 96375 TX/PRO/DX INJ NEW DRUG ADDON: CPT

## 2020-07-05 PROCEDURE — 80053 COMPREHEN METABOLIC PANEL: CPT

## 2020-07-05 PROCEDURE — 6370000000 HC RX 637 (ALT 250 FOR IP): Performed by: EMERGENCY MEDICINE

## 2020-07-05 PROCEDURE — 36415 COLL VENOUS BLD VENIPUNCTURE: CPT

## 2020-07-05 PROCEDURE — 94660 CPAP INITIATION&MGMT: CPT

## 2020-07-05 PROCEDURE — 2709999900 HC NON-CHARGEABLE SUPPLY

## 2020-07-05 PROCEDURE — 96374 THER/PROPH/DIAG INJ IV PUSH: CPT

## 2020-07-05 PROCEDURE — 99223 1ST HOSP IP/OBS HIGH 75: CPT | Performed by: NURSE PRACTITIONER

## 2020-07-05 PROCEDURE — 85025 COMPLETE CBC W/AUTO DIFF WBC: CPT

## 2020-07-05 PROCEDURE — 6360000002 HC RX W HCPCS: Performed by: NURSE PRACTITIONER

## 2020-07-05 PROCEDURE — 6370000000 HC RX 637 (ALT 250 FOR IP)

## 2020-07-05 PROCEDURE — 2580000003 HC RX 258: Performed by: EMERGENCY MEDICINE

## 2020-07-05 PROCEDURE — 2060000000 HC ICU INTERMEDIATE R&B

## 2020-07-05 PROCEDURE — U0002 COVID-19 LAB TEST NON-CDC: HCPCS

## 2020-07-05 PROCEDURE — 94760 N-INVAS EAR/PLS OXIMETRY 1: CPT

## 2020-07-05 PROCEDURE — 99284 EMERGENCY DEPT VISIT MOD MDM: CPT

## 2020-07-05 PROCEDURE — 71045 X-RAY EXAM CHEST 1 VIEW: CPT

## 2020-07-05 PROCEDURE — 93010 ELECTROCARDIOGRAM REPORT: CPT | Performed by: NUCLEAR MEDICINE

## 2020-07-05 RX ORDER — IPRATROPIUM BROMIDE AND ALBUTEROL SULFATE 2.5; .5 MG/3ML; MG/3ML
SOLUTION RESPIRATORY (INHALATION)
Status: COMPLETED
Start: 2020-07-05 | End: 2020-07-05

## 2020-07-05 RX ORDER — METOPROLOL SUCCINATE 25 MG/1
25 TABLET, EXTENDED RELEASE ORAL DAILY
Status: DISCONTINUED | OUTPATIENT
Start: 2020-07-06 | End: 2020-07-07 | Stop reason: HOSPADM

## 2020-07-05 RX ORDER — SODIUM CHLORIDE 9 MG/ML
INJECTION, SOLUTION INTRAVENOUS CONTINUOUS
Status: DISCONTINUED | OUTPATIENT
Start: 2020-07-05 | End: 2020-07-07 | Stop reason: HOSPADM

## 2020-07-05 RX ORDER — PANTOPRAZOLE SODIUM 40 MG/1
40 TABLET, DELAYED RELEASE ORAL
Status: DISCONTINUED | OUTPATIENT
Start: 2020-07-06 | End: 2020-07-07 | Stop reason: HOSPADM

## 2020-07-05 RX ORDER — HYDROCHLOROTHIAZIDE 25 MG/1
12.5 TABLET ORAL DAILY
Status: DISCONTINUED | OUTPATIENT
Start: 2020-07-06 | End: 2020-07-07 | Stop reason: HOSPADM

## 2020-07-05 RX ORDER — SODIUM CHLORIDE 0.9 % (FLUSH) 0.9 %
10 SYRINGE (ML) INJECTION EVERY 12 HOURS SCHEDULED
Status: DISCONTINUED | OUTPATIENT
Start: 2020-07-05 | End: 2020-07-07 | Stop reason: HOSPADM

## 2020-07-05 RX ORDER — LISINOPRIL AND HYDROCHLOROTHIAZIDE 12.5; 1 MG/1; MG/1
1 TABLET ORAL DAILY
Status: DISCONTINUED | OUTPATIENT
Start: 2020-07-06 | End: 2020-07-05

## 2020-07-05 RX ORDER — ONDANSETRON 4 MG/1
4 TABLET, ORALLY DISINTEGRATING ORAL ONCE
Status: COMPLETED | OUTPATIENT
Start: 2020-07-05 | End: 2020-07-05

## 2020-07-05 RX ORDER — ASPIRIN 81 MG/1
81 TABLET ORAL NIGHTLY
Status: DISCONTINUED | OUTPATIENT
Start: 2020-07-05 | End: 2020-07-07 | Stop reason: HOSPADM

## 2020-07-05 RX ORDER — ONDANSETRON 2 MG/ML
4 INJECTION INTRAMUSCULAR; INTRAVENOUS EVERY 6 HOURS PRN
Status: DISCONTINUED | OUTPATIENT
Start: 2020-07-05 | End: 2020-07-07 | Stop reason: HOSPADM

## 2020-07-05 RX ORDER — METHYLPREDNISOLONE SODIUM SUCCINATE 40 MG/ML
40 INJECTION, POWDER, LYOPHILIZED, FOR SOLUTION INTRAMUSCULAR; INTRAVENOUS EVERY 6 HOURS
Status: DISCONTINUED | OUTPATIENT
Start: 2020-07-05 | End: 2020-07-06

## 2020-07-05 RX ORDER — MAGNESIUM SULFATE IN WATER 40 MG/ML
INJECTION, SOLUTION INTRAVENOUS
Status: DISPENSED
Start: 2020-07-05 | End: 2020-07-06

## 2020-07-05 RX ORDER — ACETAMINOPHEN 325 MG/1
650 TABLET ORAL EVERY 6 HOURS PRN
Status: DISCONTINUED | OUTPATIENT
Start: 2020-07-05 | End: 2020-07-07 | Stop reason: HOSPADM

## 2020-07-05 RX ORDER — PROMETHAZINE HYDROCHLORIDE 25 MG/1
12.5 TABLET ORAL EVERY 6 HOURS PRN
Status: DISCONTINUED | OUTPATIENT
Start: 2020-07-05 | End: 2020-07-07 | Stop reason: HOSPADM

## 2020-07-05 RX ORDER — SODIUM CHLORIDE 0.9 % (FLUSH) 0.9 %
10 SYRINGE (ML) INJECTION PRN
Status: DISCONTINUED | OUTPATIENT
Start: 2020-07-05 | End: 2020-07-07 | Stop reason: HOSPADM

## 2020-07-05 RX ORDER — PANTOPRAZOLE SODIUM 40 MG/1
40 TABLET, DELAYED RELEASE ORAL
Status: DISCONTINUED | OUTPATIENT
Start: 2020-07-06 | End: 2020-07-05

## 2020-07-05 RX ORDER — POLYETHYLENE GLYCOL 3350 17 G/17G
17 POWDER, FOR SOLUTION ORAL DAILY PRN
Status: DISCONTINUED | OUTPATIENT
Start: 2020-07-05 | End: 2020-07-07 | Stop reason: HOSPADM

## 2020-07-05 RX ORDER — IPRATROPIUM BROMIDE AND ALBUTEROL SULFATE 2.5; .5 MG/3ML; MG/3ML
1 SOLUTION RESPIRATORY (INHALATION)
Status: DISCONTINUED | OUTPATIENT
Start: 2020-07-05 | End: 2020-07-05

## 2020-07-05 RX ORDER — ALLOPURINOL 300 MG/1
300 TABLET ORAL DAILY
Status: DISCONTINUED | OUTPATIENT
Start: 2020-07-06 | End: 2020-07-07 | Stop reason: HOSPADM

## 2020-07-05 RX ORDER — ATORVASTATIN CALCIUM 40 MG/1
40 TABLET, FILM COATED ORAL DAILY
Status: DISCONTINUED | OUTPATIENT
Start: 2020-07-06 | End: 2020-07-05

## 2020-07-05 RX ORDER — CLOPIDOGREL BISULFATE 75 MG/1
75 TABLET ORAL DAILY
Status: DISCONTINUED | OUTPATIENT
Start: 2020-07-06 | End: 2020-07-07 | Stop reason: HOSPADM

## 2020-07-05 RX ORDER — ACETAMINOPHEN 650 MG/1
650 SUPPOSITORY RECTAL EVERY 6 HOURS PRN
Status: DISCONTINUED | OUTPATIENT
Start: 2020-07-05 | End: 2020-07-07 | Stop reason: HOSPADM

## 2020-07-05 RX ORDER — PREDNISONE 20 MG/1
40 TABLET ORAL DAILY
Status: DISCONTINUED | OUTPATIENT
Start: 2020-07-08 | End: 2020-07-06

## 2020-07-05 RX ORDER — ATORVASTATIN CALCIUM 40 MG/1
40 TABLET, FILM COATED ORAL NIGHTLY
Status: DISCONTINUED | OUTPATIENT
Start: 2020-07-06 | End: 2020-07-07 | Stop reason: HOSPADM

## 2020-07-05 RX ORDER — 0.9 % SODIUM CHLORIDE 0.9 %
1000 INTRAVENOUS SOLUTION INTRAVENOUS ONCE
Status: COMPLETED | OUTPATIENT
Start: 2020-07-05 | End: 2020-07-05

## 2020-07-05 RX ORDER — MAGNESIUM SULFATE HEPTAHYDRATE 500 MG/ML
1 INJECTION, SOLUTION INTRAMUSCULAR; INTRAVENOUS ONCE
Status: DISCONTINUED | OUTPATIENT
Start: 2020-07-05 | End: 2020-07-05 | Stop reason: SDUPTHER

## 2020-07-05 RX ORDER — MORPHINE SULFATE 4 MG/ML
4 INJECTION, SOLUTION INTRAMUSCULAR; INTRAVENOUS ONCE
Status: COMPLETED | OUTPATIENT
Start: 2020-07-05 | End: 2020-07-05

## 2020-07-05 RX ORDER — IPRATROPIUM BROMIDE AND ALBUTEROL SULFATE 2.5; .5 MG/3ML; MG/3ML
1 SOLUTION RESPIRATORY (INHALATION)
Status: DISCONTINUED | OUTPATIENT
Start: 2020-07-05 | End: 2020-07-07 | Stop reason: HOSPADM

## 2020-07-05 RX ORDER — FAMOTIDINE 20 MG/1
20 TABLET, FILM COATED ORAL 2 TIMES DAILY
Status: DISCONTINUED | OUTPATIENT
Start: 2020-07-05 | End: 2020-07-05 | Stop reason: CLARIF

## 2020-07-05 RX ORDER — METHYLPREDNISOLONE SODIUM SUCCINATE 125 MG/2ML
125 INJECTION, POWDER, LYOPHILIZED, FOR SOLUTION INTRAMUSCULAR; INTRAVENOUS ONCE
Status: COMPLETED | OUTPATIENT
Start: 2020-07-05 | End: 2020-07-05

## 2020-07-05 RX ORDER — LISINOPRIL 10 MG/1
10 TABLET ORAL DAILY
Status: DISCONTINUED | OUTPATIENT
Start: 2020-07-06 | End: 2020-07-07 | Stop reason: HOSPADM

## 2020-07-05 RX ADMIN — IPRATROPIUM BROMIDE AND ALBUTEROL SULFATE 1 AMPULE: 2.5; .5 SOLUTION RESPIRATORY (INHALATION) at 13:59

## 2020-07-05 RX ADMIN — ALBUTEROL SULFATE 2.5 MG: 2.5 SOLUTION RESPIRATORY (INHALATION) at 13:50

## 2020-07-05 RX ADMIN — METHYLPREDNISOLONE SODIUM SUCCINATE 125 MG: 125 INJECTION, POWDER, FOR SOLUTION INTRAMUSCULAR; INTRAVENOUS at 13:51

## 2020-07-05 RX ADMIN — MAGNESIUM SULFATE HEPTAHYDRATE 1 G: 500 INJECTION, SOLUTION INTRAMUSCULAR; INTRAVENOUS at 14:58

## 2020-07-05 RX ADMIN — IPRATROPIUM BROMIDE AND ALBUTEROL SULFATE 1 AMPULE: .5; 3 SOLUTION RESPIRATORY (INHALATION) at 13:59

## 2020-07-05 RX ADMIN — ALBUTEROL SULFATE 2.5 MG: 2.5 SOLUTION RESPIRATORY (INHALATION) at 14:13

## 2020-07-05 RX ADMIN — IPRATROPIUM BROMIDE AND ALBUTEROL SULFATE 1 AMPULE: .5; 3 SOLUTION RESPIRATORY (INHALATION) at 19:59

## 2020-07-05 RX ADMIN — SODIUM CHLORIDE 1000 ML: 9 INJECTION, SOLUTION INTRAVENOUS at 13:52

## 2020-07-05 RX ADMIN — SODIUM CHLORIDE: 9 INJECTION, SOLUTION INTRAVENOUS at 20:29

## 2020-07-05 RX ADMIN — CEFTRIAXONE SODIUM 1 G: 1 INJECTION, POWDER, FOR SOLUTION INTRAMUSCULAR; INTRAVENOUS at 16:13

## 2020-07-05 RX ADMIN — ONDANSETRON 4 MG: 4 TABLET, ORALLY DISINTEGRATING ORAL at 14:42

## 2020-07-05 RX ADMIN — ENOXAPARIN SODIUM 40 MG: 40 INJECTION SUBCUTANEOUS at 21:33

## 2020-07-05 RX ADMIN — ASPIRIN 81 MG: 81 TABLET ORAL at 21:33

## 2020-07-05 RX ADMIN — MORPHINE SULFATE 4 MG: 4 INJECTION, SOLUTION INTRAMUSCULAR; INTRAVENOUS at 14:42

## 2020-07-05 RX ADMIN — METHYLPREDNISOLONE SODIUM SUCCINATE 40 MG: 40 INJECTION, POWDER, FOR SOLUTION INTRAMUSCULAR; INTRAVENOUS at 22:33

## 2020-07-05 ASSESSMENT — ENCOUNTER SYMPTOMS
COUGH: 1
EYE DISCHARGE: 0
ABDOMINAL PAIN: 0
WHEEZING: 1
SHORTNESS OF BREATH: 1
BLOOD IN STOOL: 0
SORE THROAT: 0
EYE PAIN: 0
DIARRHEA: 0

## 2020-07-05 ASSESSMENT — PAIN SCALES - GENERAL
PAINLEVEL_OUTOF10: 0

## 2020-07-05 NOTE — PROGRESS NOTES
This nurse called wife to update on patients test results and him being transferred to [facility] room 24. All questions answered no concerns voiced at this time.

## 2020-07-05 NOTE — FLOWSHEET NOTE
Paged Dr. Aissatou Winkler about pt's admission complete and pt here. Called wife, Stacy Gomez, and updated on POC.    Maribel Coy

## 2020-07-05 NOTE — ED NOTES
Bed received, [facility] office called for squad.      Willy Landry RN  07/05/20 5082

## 2020-07-05 NOTE — ED NOTES
Dr Lawrence Gibson talking with Dr Chris Sepulveda and admission process started.      Willy Landry, RN  07/05/20 0270

## 2020-07-05 NOTE — ED NOTES
Pt resting, resp sl labored, skin pale, warm and dry. CPAP continues at 12lpm O2. Rocephin infusing at 100ml/hr, . 9NS infusing at 50ml/hr, site soft and without edema or redness. Monitor:NSR. Pt denies any pain and feels like he can breathe easier. [facility] here and report given, pt released to be transported to [facility] in stable condition.       Dory Lares RN  07/05/20 6141

## 2020-07-05 NOTE — ED NOTES
Aerosol completed, lungs diminished with increased air exchange, resp sl labored.      Kiko Baez RN  07/05/20 3278

## 2020-07-05 NOTE — ED NOTES
Aerosol complete, lungs diminished with decreased air exchange, resp sl labored, skin pale, warm and dry.      Lidya Monreal RN  07/05/20 2617

## 2020-07-05 NOTE — ED PROVIDER NOTES
[address]  [address] [address] [address]  Phone: [phone]    eMERGENCY dEPARTMENT eNCOUnter           [address] [facility]       Chief Complaint   Patient presents with    Shortness of Breath       Nurses Notes reviewed and I agree except as noted in the HPI. HISTORY OF PRESENT ILLNESS    Herb Lutz is a 71 y.o. male who presented via private vehicle with the chief complaint mentioned above. He has history of COPD. He presented with 2 days history of worsening of shortness of breath and wheezing. He described his symptoms as moderate, he stated that his dyspnea is worse with exertion and in supine position. He also has chronic productive cough of clear sputum which has not changed in quantity. He denies fever or chills. He denies chest pain or syncope. Denies sick contact. He denies nausea, vomiting, abdominal pain or diarrhea. REVIEW OF SYSTEMS     Review of Systems   Constitutional: Positive for fatigue. Negative for chills and fever. HENT: Negative for sore throat. Eyes: Negative for pain and discharge. Respiratory: Positive for cough, shortness of breath and wheezing. Cardiovascular: Negative for chest pain and palpitations. Gastrointestinal: Negative for abdominal pain, blood in stool and diarrhea. Genitourinary: Negative for dysuria and hematuria. Musculoskeletal: Negative for neck pain and neck stiffness. Neurological: Negative for seizures, syncope and headaches. Hematological: Negative for adenopathy. Psychiatric/Behavioral: Negative for confusion. PAST MEDICAL HISTORY    has a past medical history of Arthritis, Cancer ([address] COPD exacerbation ([facility] [address] Elevated PSA, Gout, History of heart artery stent, and Hypertension. SURGICAL HISTORY      has a past surgical history that includes other surgical history (2013); Tonsillectomy (1950's); Appendectomy (1970's);  Colonoscopy (2009); and Prostatectomy (N/A, 10/6/2017). CURRENT MEDICATIONS       Previous Medications    ALBUTEROL (PROVENTIL) (2.5 MG/3ML) 0.083% NEBULIZER SOLUTION    Take 3 mLs by nebulization every 4 hours as needed for Wheezing    ALLOPURINOL (ZYLOPRIM) 300 MG TABLET    Take 300 mg by mouth daily    ATORVASTATIN (LIPITOR) 40 MG TABLET    Take 40 mg by mouth daily    B COMPLEX VITAMINS (VITAMIN B COMPLEX PO)    Take 1 tablet by mouth daily. CLOPIDOGREL BISULFATE (PLAVIX PO)    Take 75 mg by mouth    DIPHENHYDRAMINE (BENADRYL) 25 MG TABLET    Take 25 mg by mouth every 8 hours as needed for Itching or Allergies    GLUCOSAMINE 500 MG CAPS    Take 1 capsule by mouth daily     LISINOPRIL-HYDROCHLOROTHIAZIDE (PRINZIDE;ZESTORETIC) 10-12.5 MG PER TABLET    Take 1 tablet by mouth daily    METOPROLOL SUCCINATE (TOPROL XL) 25 MG EXTENDED RELEASE TABLET    Take 25 mg by mouth daily    NICOTINE (NICODERM CQ) 21 MG/24HR    Place 1 patch onto the skin daily    OMEPRAZOLE (PRILOSEC) 20 MG DELAYED RELEASE CAPSULE    Take 1 capsule by mouth every morning (before breakfast)    RESPIRATORY THERAPY SUPPLIES (NEBULIZER COMPRESSOR) KIT    1 kit by Does not apply route once for 1 dose    TIOTROPIUM BROMIDE-OLODATEROL (STIOLTO RESPIMAT) 2.5-2.5 MCG/ACT AERS    Inhale 2 puffs into the lungs daily       ALLERGIES     is allergic to cephalosporins and tetracyclines & related. FAMILY HISTORY     He indicated that his mother is alive. He indicated that his father is . family history is not on file. SOCIAL HISTORY      reports that he has quit smoking. His smoking use included cigarettes. He has a 44.00 pack-year smoking history. He has never used smokeless tobacco. He reports current alcohol use. He reports that he does not use drugs. PHYSICAL EXAM     INITIAL VITALS:  height is 6' (1.829 m) and weight is 216 lb (98 kg). His tympanic temperature is 98.6 °F (37 °C). His blood pressure is 141/92 (abnormal) and his pulse is 85.  His respiration is 30 and oxygen saturation is 97%. Physical Exam   Constitutional: He appears well-developed and well-nourished. He appears distressed. Looks mildly ill   HENT:   Head: Atraumatic. Mouth/Throat: Oropharynx is clear and moist.   Eyes: Pupils are equal, round, and reactive to light. Conjunctivae are normal.   Neck: Neck supple. No JVD present. No tracheal deviation present. No thyromegaly present. Cardiovascular: Normal rate and regular rhythm. Exam reveals no gallop and no friction rub. No murmur heard. Pulmonary/Chest:   He is in moderate respiratory distress, his work of breathing is moderately labored. Breath sounds are diminished throughout. He has bilateral faint expiratory wheezing. Abdominal: Soft. Bowel sounds are normal. There is no abdominal tenderness. Musculoskeletal:         General: No tenderness or edema. Neurological: He is alert. He has normal strength. No cranial nerve deficit. Psychiatric: He has a normal mood and affect. Nursing note and vitals reviewed.           DIAGNOSTIC RESULTS     EKG:   EKG Interpretation    Interpreted by me    Rhythm: normal sinus   Rate: normal  Axis: normal  Ectopy: none  Conduction: normal  ST Segments: no acute change  T Waves: no acute change  Q Waves: none    Clinical Impression: no acute changes and normal EKG    RADIOLOGY:   Chest x-ray was unremarkable  LABS:   Labs Reviewed   CBC WITH AUTO DIFFERENTIAL - Abnormal; Notable for the following components:       Result Value    WBC 11.0 (*)     Lymphocytes 12.8 (*)     Eosinophils % 6.7 (*)     All other components within normal limits   COMPREHENSIVE METABOLIC PANEL - Abnormal; Notable for the following components:    Glucose 113 (*)     CREATININE 1.6 (*)     All other components within normal limits   GLOMERULAR FILTRATION RATE, ESTIMATED - Abnormal; Notable for the following components:    GFR, Estimated 46 (*)     All other components within normal limits   TROPONIN   BRAIN NATRIURETIC PEPTIDE   ANION GAP Laboratory studies showed borderline creatinine otherwise unremarkable. EMERGENCY DEPARTMENT COURSE:   Vitals:    Vitals:    07/05/20 1417 07/05/20 1502 07/05/20 1518 07/05/20 1548   BP: 130/88 (!) 143/87 131/83 (!) 141/92   Pulse: 98 88 83 85   Resp: 29 28 27 30   Temp:       TempSrc:       SpO2: 91% 96%  97%   Weight:       Height:         Patient received 1 DuoNeb and 2 albuterol treatments, he received Solu-Medrol and Rocephin IV. Reevaluation at 3:10 PM:  He is tolerating CPAP, O2 sat 100%, his breathing is less labored, he was awake and alert. Reevaluation at 3:55 PM:  He is awake and alert, he breathing more comfortably, tolerating CPAP, he indicated improvement in his breathing status. Critical care:  Due to the immediate potential for life-threatening deterioration due to respiratory failure and COPD exacerbation, I spent 35 minutes providing critical care. This time is excluding time spent performing procedures. CONSULTS:  I spoke with Dr. Maryuri Otero      1. COPD exacerbation ([facility] [address]    2. Shortness of breath    3. Acute respiratory failure with hypoxia (HCC)          DISPOSITION/PLAN   Admit to stepdown unit, condition is critical.  PATIENT REFERRED TO:  No follow-up provider specified.     DISCHARGE MEDICATIONS:  New Prescriptions    No medications on file       (Please note that portions of this note were completed with a voice recognition program.  Efforts were made to edit the dictations but occasionally words are mis-transcribed.)    MD Tammy Harvey MD  07/05/20 9208

## 2020-07-05 NOTE — PROGRESS NOTES
Report called to [facility] on 4K @ 1925. Patient transferred to [facility] room 24 in stable condition @ 1945.

## 2020-07-05 NOTE — ED NOTES
CPAP started at this time. O2 at 12lpm, O2 sat=98%, pt tolerating well.       Jeevan Godoy, MIKY  07/05/20 Abby Cagle, MIKY  07/05/20 1930

## 2020-07-05 NOTE — H&P
Hospitalist - History & Physical      Patient: Maryjane Hirsch    Unit/Bed:6A-16/016-A  YOB: 1950  MRN: [id number]   Acct: [de-identified]   PCP: Particia Habermann    Date of Service: Pt seen/examined on 07/05/20  and Admitted to Inpatient with expected LOS greater than two midnights due to medical therapy. Chief Complaint: Shortness of breath    Assessment and Plan:-    1. Acute on chronic COPD exacerbation: Patient presented to the ER tachypneic in respiratory distress, without hypoxia. 2 rounds of albuterol and DuoNeb therapy was provided along with IV Solu-Medrol. Expiratory wheezing noted upon auscultation. CXR demonstrated no acute intrathoracic process. Continue IV steroids for 48 hours, transitioning to oral afterwards. CPAP with naps, at bedtime, and as needed. Duonebs q4h while awake, Bevespi 2 puffs BID, incentive spirometry and encourage pulmonary toileting. 2. Sepsis: Criteria met with mild leukocytosis and tachypnea with a pulmonary suspected source. Patient was given IV Rocephin in the ER. Currently afebrile, initial COVID-19 and influenza A&B screening negative. Patient does not appear infectious at this time, IV antibiotics will be held. Lactic acid and blood cultures pending. 3. PACO on CKD: Creatinine 1.6 on admission, baseline appears to be close to 1.2. Likely pre-renal in nature. IV fluid bolus given in the ER, initiate gentle IV fluid hydration with NS at 100 ml/hr. Re-assess chemistry in the morning. 4. Coronary artery disease: S/p stent placement in March 2019. Continue aspirin and Plavix. Maintain telemetry as well as cardiac diet. 5. Benign essential hypertension: History, stable. Currently normotensive. Continue lisinopril and hydrochlorothiazide. Maintain low-sodium diet. 6. Hyperlipidemia: Continue atorvastatin nightly. 7. Gout: History. Continue allopurinol.     History Of Present Illness:    Dorene Mcardle is a 27-year-old  male, reformed exacerbation, shortness of breath, and acute respiratory failure with hypoxia. The hospitalist service was contacted for further evaluation, treatment, and management. Past Medical History:        Diagnosis Date    Arthritis     Cancer [facility]) 2017    prostate cancer 8-2017    COPD exacerbation ([facility] [address] 7/5/2020    Elevated PSA     Gout     History of heart artery stent 03/14/2019    LAD with [facility]. Irina Patterson at [facility]        Past Surgical History:        Procedure Laterality Date    APPENDECTOMY  1970's    COLONOSCOPY  2009    OTHER SURGICAL HISTORY  2013    mole removal    PROSTATECTOMY N/A 10/6/2017    ROBOTIC RADICAL PROSTATECTOMY, BILATERAL PELVIC LYMPH NODE DISSECTION performed by April Arriola MD at [address] [place] [address]  [address]       Home Medications:   No current facility-administered medications on file prior to encounter.       Current Outpatient Medications on File Prior to Encounter   Medication Sig Dispense Refill    allopurinol (ZYLOPRIM) 300 MG tablet Take 300 mg by mouth daily      metoprolol succinate (TOPROL XL) 25 MG extended release tablet Take 25 mg by mouth daily      atorvastatin (LIPITOR) 40 MG tablet Take 40 mg by mouth daily      lisinopril-hydrochlorothiazide (PRINZIDE;ZESTORETIC) 10-12.5 MG per tablet Take 1 tablet by mouth daily      omeprazole (PRILOSEC) 20 MG delayed release capsule Take 1 capsule by mouth every morning (before breakfast) 90 capsule 3    Tiotropium Bromide-Olodaterol (STIOLTO RESPIMAT) 2.5-2.5 MCG/ACT AERS Inhale 2 puffs into the lungs daily 1 Inhaler 11    nicotine (NICODERM CQ) 21 MG/24HR Place 1 patch onto the skin daily 30 patch 0    Clopidogrel Bisulfate (PLAVIX PO) Take 75 mg by mouth      albuterol (PROVENTIL) (2.5 MG/3ML) 0.083% nebulizer solution Take 3 mLs by nebulization every 4 hours as needed for Wheezing 1 Package 0    Respiratory Therapy Supplies (NEBULIZER COMPRESSOR) KIT 1 kit by Does not apply route once for 1 dose 1 kit 0    diphenhydrAMINE (BENADRYL) 25 MG tablet Take 25 mg by mouth every 8 hours as needed for Itching or Allergies      Glucosamine 500 MG CAPS Take 1 capsule by mouth daily       B Complex Vitamins (VITAMIN B COMPLEX PO) Take 1 tablet by mouth daily. Allergies:    Cephalosporins and Tetracyclines & related    Social History:    reports that he has quit smoking. His smoking use included cigarettes. He has a 44.00 pack-year smoking history. He has never used smokeless tobacco. He reports current alcohol use. He reports that he does not use drugs. Family History:   History reviewed. No pertinent family history. Diet:  No diet orders on file    Review of systems:   Pertinent positives as noted in the HPI. All other systems reviewed and negative. PHYSICAL EXAM:  /87   Pulse 84   Temp 98 °F (36.7 °C) (Oral)   Resp 24   Ht 6' (1.829 m)   Wt 218 lb 14.4 oz (99.3 kg)   SpO2 97%   BMI 29.69 kg/m²     General appearance: Alert and appropriate, pleasant geriatric male. No apparent distress, appears stated age and cooperative. HEENT: Normal cephalic, atraumatic without obvious deformity. Pupils equal, round, and reactive to light. Extra ocular muscles intact. Conjunctivae/corneas clear. Neck: Supple, with full range of motion. No jugular venous distention. Trachea midline. Respiratory:  Normal respiratory effort. Expiratory wheezing noted to auscultation, without Rales/Rhonchi. Cardiovascular: Regular rate and rhythm with normal S1/S2 without murmurs, rubs or gallops. Abdomen: Soft, non-tender, non-distended with normal bowel sounds. Musculoskeletal:  No clubbing, cyanosis or edema bilaterally. Skin: Skin color, texture, turgor normal.  No rashes or lesions. Neurologic:  Neurovascularly intact without any focal sensory/motor deficits.  Cranial nerves: II-XII intact, grossly non-focal.  Psychiatric: Alert and oriented, thought content appropriate, normal insight  Capillary Refill: Brisk,< 3 seconds   Peripheral Pulses: +2 palpable, equal bilaterally     Labs:   Recent Labs     20  1342   WBC 11.0*   HGB 14.6   HCT 44.2        Recent Labs     20  1342      K 4.2      CO2 27   BUN 17   CREATININE 1.6*     Recent Labs     20  1342   AST 20   ALT 57   BILITOT 0.3   ALKPHOS 58     No results for input(s): INR in the last 72 hours. Recent Labs     20  1342   TROPONINI < 0.017       Urinalysis:    Lab Results   Component Value Date    NITRU NEGATIVE 10/03/2017    WBCUA NONE 10/03/2017    BACTERIA FEW 10/03/2017    RBCUA 0-2 10/03/2017    BLOODU small 2017    BLOODU TRACE 10/03/2017    SPECGRAV 1.020 10/03/2017    GLUCOSEU Negative 2017       Radiology:   XR CHEST PORTABLE   Final Result      No acute intrathoracic process. **This report has been created using voice recognition software. It may contain minor errors which are inherent in voice recognition technology. **      Final report electronically signed by Dr. Emily Villaseñor on 2020 2:59 PM        Xr Chest Portable    Result Date: 2020  PROCEDURE: XR CHEST PORTABLE CLINICAL INFORMATION: Shortness of breath TECHNIQUE: Mobile AP chest radiograph. COMPARISON: PA and lateral chest radiographs 2020 FINDINGS: Cardiomediastinal silhouette is within normal limits. Stable reticular opacities at the left lung base are likely secondary to scarring. There are no lung consolidations. Degenerative changes in the thoracic spine are poorly visualized. No acute intrathoracic process. **This report has been created using voice recognition software. It may contain minor errors which are inherent in voice recognition technology. ** Final report electronically signed by Dr. Emily Villaseñor on 2020 2:59 PM    EK-lead analysis conducted on 2020 at 1321 was personally reviewed by myself.   Normal sinus rhythm is observed with a ventricular rate of

## 2020-07-06 LAB
ANION GAP SERPL CALCULATED.3IONS-SCNC: 12 MEQ/L (ref 8–16)
BASOPHILS # BLD: 0.2 %
BASOPHILS ABSOLUTE: 0 THOU/MM3 (ref 0–0.1)
BUN BLDV-MCNC: 21 MG/DL (ref 7–22)
CALCIUM SERPL-MCNC: 8.5 MG/DL (ref 8.5–10.5)
CHLORIDE BLD-SCNC: 106 MEQ/L (ref 98–111)
CO2: 22 MEQ/L (ref 23–33)
CREAT SERPL-MCNC: 1.2 MG/DL (ref 0.4–1.2)
EOSINOPHIL # BLD: 0.2 %
EOSINOPHILS ABSOLUTE: 0 THOU/MM3 (ref 0–0.4)
ERYTHROCYTE [DISTWIDTH] IN BLOOD BY AUTOMATED COUNT: 14.6 % (ref 11.5–14.5)
ERYTHROCYTE [DISTWIDTH] IN BLOOD BY AUTOMATED COUNT: 49.6 FL (ref 35–45)
GFR SERPL CREATININE-BSD FRML MDRD: 60 ML/MIN/1.73M2
GLUCOSE BLD-MCNC: 185 MG/DL (ref 70–108)
HCT VFR BLD CALC: 39.6 % (ref 42–52)
HEMOGLOBIN: 12.9 GM/DL (ref 14–18)
IMMATURE GRANS (ABS): 0.06 THOU/MM3 (ref 0–0.07)
IMMATURE GRANULOCYTES: 0.5 %
LACTIC ACID: 2.3 MMOL/L (ref 0.5–2.2)
LACTIC ACID: 2.6 MMOL/L (ref 0.5–2.2)
LACTIC ACID: 2.9 MMOL/L (ref 0.5–2.2)
LACTIC ACID: 4.2 MMOL/L (ref 0.5–2.2)
LYMPHOCYTES # BLD: 6.8 %
LYMPHOCYTES ABSOLUTE: 0.8 THOU/MM3 (ref 1–4.8)
MCH RBC QN AUTO: 30.5 PG (ref 26–33)
MCHC RBC AUTO-ENTMCNC: 32.6 GM/DL (ref 32.2–35.5)
MCV RBC AUTO: 93.6 FL (ref 80–94)
MONOCYTES # BLD: 1.8 %
MONOCYTES ABSOLUTE: 0.2 THOU/MM3 (ref 0.4–1.3)
NUCLEATED RED BLOOD CELLS: 0 /100 WBC
PLATELET # BLD: 230 THOU/MM3 (ref 130–400)
PMV BLD AUTO: 11.8 FL (ref 9.4–12.4)
POTASSIUM REFLEX MAGNESIUM: 4.6 MEQ/L (ref 3.5–5.2)
RBC # BLD: 4.23 MILL/MM3 (ref 4.7–6.1)
SEG NEUTROPHILS: 90.5 %
SEGMENTED NEUTROPHILS ABSOLUTE COUNT: 11 THOU/MM3 (ref 1.8–7.7)
SODIUM BLD-SCNC: 140 MEQ/L (ref 135–145)
WBC # BLD: 12.1 THOU/MM3 (ref 4.8–10.8)

## 2020-07-06 PROCEDURE — 2580000003 HC RX 258: Performed by: PHYSICIAN ASSISTANT

## 2020-07-06 PROCEDURE — 2060000000 HC ICU INTERMEDIATE R&B

## 2020-07-06 PROCEDURE — 6370000000 HC RX 637 (ALT 250 FOR IP): Performed by: NURSE PRACTITIONER

## 2020-07-06 PROCEDURE — 99232 SBSQ HOSP IP/OBS MODERATE 35: CPT | Performed by: PHYSICIAN ASSISTANT

## 2020-07-06 PROCEDURE — 80048 BASIC METABOLIC PNL TOTAL CA: CPT

## 2020-07-06 PROCEDURE — 6370000000 HC RX 637 (ALT 250 FOR IP): Performed by: PHYSICIAN ASSISTANT

## 2020-07-06 PROCEDURE — 85025 COMPLETE CBC W/AUTO DIFF WBC: CPT

## 2020-07-06 PROCEDURE — 36415 COLL VENOUS BLD VENIPUNCTURE: CPT

## 2020-07-06 PROCEDURE — 94760 N-INVAS EAR/PLS OXIMETRY 1: CPT

## 2020-07-06 PROCEDURE — 94640 AIRWAY INHALATION TREATMENT: CPT

## 2020-07-06 PROCEDURE — 6360000002 HC RX W HCPCS: Performed by: NURSE PRACTITIONER

## 2020-07-06 PROCEDURE — 2580000003 HC RX 258: Performed by: NURSE PRACTITIONER

## 2020-07-06 PROCEDURE — 83605 ASSAY OF LACTIC ACID: CPT

## 2020-07-06 RX ORDER — MULTIVITAMIN WITH IRON
1 TABLET ORAL DAILY
Status: DISCONTINUED | OUTPATIENT
Start: 2020-07-06 | End: 2020-07-07 | Stop reason: HOSPADM

## 2020-07-06 RX ORDER — PREDNISONE 20 MG/1
40 TABLET ORAL DAILY
Status: DISCONTINUED | OUTPATIENT
Start: 2020-07-06 | End: 2020-07-07 | Stop reason: HOSPADM

## 2020-07-06 RX ORDER — 0.9 % SODIUM CHLORIDE 0.9 %
1000 INTRAVENOUS SOLUTION INTRAVENOUS ONCE
Status: COMPLETED | OUTPATIENT
Start: 2020-07-06 | End: 2020-07-06

## 2020-07-06 RX ORDER — 0.9 % SODIUM CHLORIDE 0.9 %
500 INTRAVENOUS SOLUTION INTRAVENOUS ONCE
Status: COMPLETED | OUTPATIENT
Start: 2020-07-06 | End: 2020-07-06

## 2020-07-06 RX ADMIN — IPRATROPIUM BROMIDE AND ALBUTEROL SULFATE 1 AMPULE: .5; 3 SOLUTION RESPIRATORY (INHALATION) at 09:31

## 2020-07-06 RX ADMIN — PREDNISONE 40 MG: 20 TABLET ORAL at 16:38

## 2020-07-06 RX ADMIN — HYDROCHLOROTHIAZIDE 12.5 MG: 25 TABLET ORAL at 09:15

## 2020-07-06 RX ADMIN — ASPIRIN 81 MG: 81 TABLET ORAL at 22:14

## 2020-07-06 RX ADMIN — IPRATROPIUM BROMIDE AND ALBUTEROL SULFATE 1 AMPULE: .5; 3 SOLUTION RESPIRATORY (INHALATION) at 20:32

## 2020-07-06 RX ADMIN — ALLOPURINOL 300 MG: 300 TABLET ORAL at 09:15

## 2020-07-06 RX ADMIN — SODIUM CHLORIDE 500 ML: 9 INJECTION, SOLUTION INTRAVENOUS at 09:23

## 2020-07-06 RX ADMIN — GLYCOPYRROLATE AND FORMOTEROL FUMARATE 2 PUFF: 9; 4.8 AEROSOL, METERED RESPIRATORY (INHALATION) at 09:41

## 2020-07-06 RX ADMIN — SODIUM CHLORIDE: 9 INJECTION, SOLUTION INTRAVENOUS at 16:38

## 2020-07-06 RX ADMIN — METHYLPREDNISOLONE SODIUM SUCCINATE 40 MG: 40 INJECTION, POWDER, FOR SOLUTION INTRAMUSCULAR; INTRAVENOUS at 09:15

## 2020-07-06 RX ADMIN — LISINOPRIL 10 MG: 10 TABLET ORAL at 09:15

## 2020-07-06 RX ADMIN — CLOPIDOGREL BISULFATE 75 MG: 75 TABLET ORAL at 09:15

## 2020-07-06 RX ADMIN — THERA TABS 1 TABLET: TAB at 12:40

## 2020-07-06 RX ADMIN — GLYCOPYRROLATE AND FORMOTEROL FUMARATE 2 PUFF: 9; 4.8 AEROSOL, METERED RESPIRATORY (INHALATION) at 20:32

## 2020-07-06 RX ADMIN — METHYLPREDNISOLONE SODIUM SUCCINATE 40 MG: 40 INJECTION, POWDER, FOR SOLUTION INTRAMUSCULAR; INTRAVENOUS at 03:30

## 2020-07-06 RX ADMIN — SODIUM CHLORIDE 1000 ML: 9 INJECTION, SOLUTION INTRAVENOUS at 15:30

## 2020-07-06 RX ADMIN — IPRATROPIUM BROMIDE AND ALBUTEROL SULFATE 1 AMPULE: .5; 3 SOLUTION RESPIRATORY (INHALATION) at 13:09

## 2020-07-06 RX ADMIN — ATORVASTATIN CALCIUM 40 MG: 40 TABLET, FILM COATED ORAL at 22:14

## 2020-07-06 RX ADMIN — PANTOPRAZOLE SODIUM 40 MG: 40 TABLET, DELAYED RELEASE ORAL at 05:28

## 2020-07-06 RX ADMIN — SODIUM CHLORIDE: 9 INJECTION, SOLUTION INTRAVENOUS at 09:24

## 2020-07-06 RX ADMIN — METOPROLOL SUCCINATE 25 MG: 25 TABLET, FILM COATED, EXTENDED RELEASE ORAL at 09:15

## 2020-07-06 RX ADMIN — SODIUM CHLORIDE: 9 INJECTION, SOLUTION INTRAVENOUS at 06:00

## 2020-07-06 ASSESSMENT — PAIN SCALES - GENERAL
PAINLEVEL_OUTOF10: 0
PAINLEVEL_OUTOF10: 0

## 2020-07-06 NOTE — PROGRESS NOTES
Hospitalist Progress Note    Patient:  Flaco Robins    Unit/Bed:4K-24/024-A  YOB: 1950  MRN: [id number]   Acct: [de-identified]   PCP: Jo Ann Gordillo  Code Status: Full Code  Date of Admission: 7/5/2020    Expected Discharge: 7/7  Disposition: Home     Assessment/Plan:    1. Acute COPD exacerbation: Pt presented to ER in respiratory distress, without hypoxia. 2 rounds of albuterol and DuoNeb therapy was provided along with IV Solu-Medrol. Expiratory wheezing noted upon auscultation. No acute findings on CXR. - Pt started on Bevespi BID, Duonebs q4h, IS. SOB resolved, will transition from Solu-Medrol to PO prednisone. 2. Lactic acidosis: Pt does not appear infectious at this time; etiology unclear. D/t PACO / dehydration? However, did not improve with gentle IV hydration. No medications to cause it. - Will order IVF Bolus and recheck. 3. PACO on CKD: Likely pre-renal in nature. Improving with IV hydration. 4. CAD: S/p stent placement in March 2019. Continue aspirin and Plavix. Maintain telemetry as well as cardiac diet. 5. Benign essential hypertension: History, stable. Currently normotensive. Continue lisinopril and hctz. Maintain low-sodium diet. 6. Hyperlipidemia: Continue atorvastatin nightly. 7. Gout: History. Continue allopurinol. Chief Complaint: SOB    HPI / Hospital Course: HPI: Margi Marie is a 59-year-old  male, reformed smoker, with a medical history of arthritis, history of prostate cancer, COPD, history of an elevated PSA, gout, hypertension, and a history of a stent to the LAD in 2019. Patient presented [facility] ER with complaints of shortness of breath. Patient stated that he first started to notice his shortness of breath on Thursday/Friday after spending approximately 1 week in [place] with family.   He states that he did a substantial amount of hiking and physical activity, more than he normally does at home, stating that he is sedentary for the most part, questioning if he overdid things. Patient reports dyspnea with exertion as well as a chronic productive cough without sputum production at this time. Patient denies any recent exposure to sick contacts, fever, chills, chest pain, abdominal pain, nausea, vomiting, diarrhea, constipation, urinary complaints, lightheadedness, dizziness, headaches, changes in vision, fever, or chills. The patient did report that he smoked for approximately 50 years and quit back in January. Patient reports that he follows with Gina Wang CNP for pulmonology reporting that he saw her in the office about a half a year ago. He stated that his sleep study was performed which was found to be negative and has intermittently had episodes of shortness of breath since he quit smoking. He did state that this episode is worse than the prior episodes. While in the ER, the patient received 1 DuoNeb and 2 albuterol treatments as well as IV Solu-Medrol and Rocephin. The patient was placed on CPAP and found to have tolerated the assistance well. After 2 hours on the CPAP, the patient reported that his breathing is significantly better and the patient visibly looks more comfortable. Lab work revealed a mild leukocytosis with a WBC count of 11 and chemistry demonstrated a creatinine level of 1.6, indicative of an acute kidney injury. The patient was admitted for a COPD exacerbation, shortness of breath, and acute respiratory failure with hypoxia. The hospitalist service was contacted for further evaluation, treatment, and management. \"     Subjective (past 24 hours): Pt doing well. States SOB has resolved. Denies fever/chills, CP, abdominal pain. ROS: Pertinent positives as noted in HPI. All other systems reviewed and negative. Past medical history, family history, social history and allergies reviewed again and is unchanged since admission. Medications:  Reviewed.   Infusion Medications    sodium chloride 100 mL/hr at 07/06/20 3140     Scheduled Medications    multivitamin  1 tablet Oral Daily    sodium chloride flush  10 mL Intravenous 2 times per day    enoxaparin  40 mg Subcutaneous Q24H    ipratropium-albuterol  1 ampule Inhalation Q4H WA    methylPREDNISolone  40 mg Intravenous Q6H    Followed by   Leni Palma ON 7/8/2020] predniSONE  40 mg Oral Daily    pantoprazole  40 mg Oral QAM AC    clopidogrel  75 mg Oral Daily    allopurinol  300 mg Oral Daily    metoprolol succinate  25 mg Oral Daily    glycopyrrolate-formoterol  2 puff Inhalation BID    lisinopril  10 mg Oral Daily    And    hydroCHLOROthiazide  12.5 mg Oral Daily    atorvastatin  40 mg Oral Nightly    aspirin  81 mg Oral Nightly     PRN Meds: sodium chloride flush, acetaminophen **OR** acetaminophen, polyethylene glycol, promethazine **OR** ondansetron    I/O:     Intake/Output Summary (Last 24 hours) at 7/6/2020 1342  Last data filed at 7/6/2020 1030  Gross per 24 hour   Intake 814.2 ml   Output 650 ml   Net 164.2 ml       Diet:  DIET LOW SODIUM 2 GM; Exam:  /70   Pulse 98   Temp 97.4 °F (36.3 °C) (Oral)   Resp 16   Ht 6' (1.829 m)   Wt 216 lb 8 oz (98.2 kg)   SpO2 91%   BMI 29.36 kg/m²   General:   Pleasant male. NAD. HEENT:  normocephalic and atraumatic. No scleral icterus. PERR. Neck: supple. No JVD. No thyromegaly. Lungs: Diffuse expiratory wheezing. No retractions  Cardiac: RRR without murmur. Abdomen: soft. Nontender. Bowel sounds positive. Extremities:  No clubbing, cyanosis, or edema x 4. Vasculature: capillary refill < 3 seconds. Palpable LE pulses bilaterally. Skin:  warm and dry. Psych:  Alert and oriented x3. Affect appropriate  Lymph:  No supraclavicular adenopathy. Neurologic:  No focal deficit. No seizures.       Data: (All radiographs, tracings, PFTs, and imaging are personally viewed and interpreted unless otherwise noted)  Labs:   Recent Labs 07/05/20  1342 07/06/20  0558   WBC 11.0* 12.1*   HGB 14.6 12.9*   HCT 44.2 39.6*    230     Recent Labs     07/05/20  1342 07/06/20  0558    140   K 4.2 4.6    106   CO2 27 22*   BUN 17 21   CREATININE 1.6* 1.2   CALCIUM  --  8.5     Recent Labs     07/05/20  1342   AST 20   ALT 57   BILITOT 0.3   ALKPHOS 58     No results for input(s): INR in the last 72 hours. Recent Labs     07/05/20  1342   TROPONINI < 0.017     Urinalysis:   Lab Results   Component Value Date    NITRU NEGATIVE 10/03/2017    WBCUA NONE 10/03/2017    BACTERIA FEW 10/03/2017    RBCUA 0-2 10/03/2017    BLOODU small 12/05/2017    BLOODU TRACE 10/03/2017    SPECGRAV 1.020 10/03/2017    GLUCOSEU Negative 09/21/2017     Urine culture:   Lab Results   Component Value Date    LABURIN No growth-preliminary No growth  02/24/2020     Micro:   Blood culture #1:   Lab Results   Component Value Date    BC No growth-preliminary  07/05/2020     Blood culture #2:No results found for: [facility] [place]  Organism:No results found for: [facility]      Lab Results   Component Value Date    LABGRAM  02/24/2020     Few segmented neutrophils observed. Rare epithelial cells observed. No bacteria seen. MRSA culture only:No results found for: [facility]  Respiratory culture: No results found for: CULTRESP  Aerobic and Anaerobic :  No results found for: LABAERO  No results found for: [address]    Radiology Reports:  XR CHEST PORTABLE   Final Result      No acute intrathoracic process. **This report has been created using voice recognition software. It may contain minor errors which are inherent in voice recognition technology. **      Final report electronically signed by Dr. Lis Gaffney on 7/5/2020 2:59 PM        Xr Chest Portable    Result Date: 7/5/2020  PROCEDURE: XR CHEST PORTABLE CLINICAL INFORMATION: Shortness of breath TECHNIQUE: Mobile AP chest radiograph.  COMPARISON: PA and lateral chest radiographs 2/27/2020 FINDINGS: Cardiomediastinal silhouette is within normal limits. Stable reticular opacities at the left lung base are likely secondary to scarring. There are no lung consolidations. Degenerative changes in the thoracic spine are poorly visualized. No acute intrathoracic process. **This report has been created using voice recognition software. It may contain minor errors which are inherent in voice recognition technology. ** Final report electronically signed by Dr. Marian Noble on 7/5/2020 2:59 PM      Data Summary:  CXR 7/5: No acute process.      EKG: NSR    Tele:   [x] yes             [] no      Active Hospital Problems    Diagnosis Date Noted    COPD exacerbation ([facility] [address] [J44.1] 07/05/2020    Acute on chronic respiratory failure with hypoxia [facility]) [J96.21] 07/05/2020       Electronically signed by Mao Crowe PA-C on 7/6/2020 at 1:42 PM

## 2020-07-06 NOTE — CARE COORDINATION
7/6/20, 1:41 PM EDT  DISCHARGE PLANNING EVALUATION:    Jamel Mejía       Admitted from: [facility] 7/5/2020/ Anatsacio Varghese Lucas David day: 1   Location: [facility]24/024-A Reason for admit: COPD exacerbation ([facility] [address] [J44.1]  Acute on chronic respiratory failure with hypoxia ([facility] [address] [J96.21] Status: IP  Admit order signed?: yes  PMH:  has a past medical history of Arthritis, Cancer ([facility] [address] COPD exacerbation ([facility] [address] Elevated PSA, Gout, History of heart artery stent, and Hypertension. Procedure:   Medications:  Scheduled Meds:   multivitamin  1 tablet Oral Daily    sodium chloride flush  10 mL Intravenous 2 times per day    enoxaparin  40 mg Subcutaneous Q24H    ipratropium-albuterol  1 ampule Inhalation Q4H WA    methylPREDNISolone  40 mg Intravenous Q6H    Followed by   Brandon Sheridan ON 7/8/2020] predniSONE  40 mg Oral Daily    pantoprazole  40 mg Oral QAM AC    clopidogrel  75 mg Oral Daily    allopurinol  300 mg Oral Daily    metoprolol succinate  25 mg Oral Daily    glycopyrrolate-formoterol  2 puff Inhalation BID    lisinopril  10 mg Oral Daily    And    hydroCHLOROthiazide  12.5 mg Oral Daily    atorvastatin  40 mg Oral Nightly    aspirin  81 mg Oral Nightly     Continuous Infusions:   sodium chloride 100 mL/hr at 07/06/20 6163      Pertinent Info/Orders/Treatment Plan:  Client admitted with COPD treated with IV Steroids, IVF. Elevated WBC; monitor  Diet: DIET LOW SODIUM 2 GM;   Smoking status:  reports that he has quit smoking. His smoking use included cigarettes. He has a 44.00 pack-year smoking history.  He has never used smokeless tobacco.   PCP: Carlo Issa  Readmission 30 days or less: none  Readmission Risk Score: 21%    Discharge Planning Evaluation  Current Residence:  Private Residence  Living Arrangements:  Spouse/Significant Other   Support Systems:  Spouse/Significant Other  Current Services PTA:     Potential Assistance Needed:  N/A  Potential Assistance Purchasing Medications:  No  Does patient want to participate in local refill/ meds to beds program?  No  Type of Home Care Services:  None  Patient expects to be discharged to:  Home with spouse  Expected Discharge date:  07/10/20  Follow Up Appointment: Best Day/ Time: Wednesday PM    Patient Goals/Plan/Treatment Preferences: met with client; denied needs as plans home with spouse Pee when medically cleared, has nebulizer  Transportation/Food Security/Housekeeping Addressed:  No issues identified.  Evaluation: no     7/6/20, 1:42 PM EDT    Patient goals/plan/ treatment preferences discussed by  and . Patient goals/plan/ treatment preferences reviewed with patient/ family. Patient/ family verbalize understanding of discharge plan and are in agreement with goal/plan/treatment preferences. Understanding was demonstrated using the teach back method. AVS provided by RN at time of discharge, which includes all necessary medical information pertaining to the patients current course of illness, treatment, post-discharge goals of care, and treatment preferences.

## 2020-07-06 NOTE — PLAN OF CARE
Problem: Falls - Risk of:  Goal: Will remain free from falls  Description: Will remain free from falls  Outcome: Ongoing  Note: Patient absent of falls this shift. Problem: RESPIRATORY  Goal: Clear lung sounds  7/5/2020 2003 by Angie Velázquez RCP  Outcome: Ongoing     Problem: Discharge Planning:  Goal: Discharged to appropriate level of care  Description: Discharged to appropriate level of care  Outcome: Ongoing  Note: Patient to be discharged home with spouse. Pain Assessment: 0-10  Pain Level: 0   Patient's Stated Pain Goal: No pain   Is pain goal met at this time? Yes    Care plan reviewed with patient. Patient verbalize understanding of the plan of care and contribute to goal setting.

## 2020-07-07 VITALS
SYSTOLIC BLOOD PRESSURE: 114 MMHG | WEIGHT: 216.5 LBS | BODY MASS INDEX: 29.32 KG/M2 | RESPIRATION RATE: 16 BRPM | HEIGHT: 72 IN | OXYGEN SATURATION: 95 % | DIASTOLIC BLOOD PRESSURE: 63 MMHG | TEMPERATURE: 97.7 F | HEART RATE: 78 BPM

## 2020-07-07 LAB
LACTIC ACID: 2.3 MMOL/L (ref 0.5–2.2)
LACTIC ACID: 2.4 MMOL/L (ref 0.5–2.2)

## 2020-07-07 PROCEDURE — 94760 N-INVAS EAR/PLS OXIMETRY 1: CPT

## 2020-07-07 PROCEDURE — 83605 ASSAY OF LACTIC ACID: CPT

## 2020-07-07 PROCEDURE — 2580000003 HC RX 258: Performed by: PHYSICIAN ASSISTANT

## 2020-07-07 PROCEDURE — 36415 COLL VENOUS BLD VENIPUNCTURE: CPT

## 2020-07-07 PROCEDURE — 6370000000 HC RX 637 (ALT 250 FOR IP): Performed by: PHYSICIAN ASSISTANT

## 2020-07-07 PROCEDURE — 6370000000 HC RX 637 (ALT 250 FOR IP): Performed by: NURSE PRACTITIONER

## 2020-07-07 PROCEDURE — 99239 HOSP IP/OBS DSCHRG MGMT >30: CPT | Performed by: PHYSICIAN ASSISTANT

## 2020-07-07 PROCEDURE — 2580000003 HC RX 258: Performed by: NURSE PRACTITIONER

## 2020-07-07 PROCEDURE — 94640 AIRWAY INHALATION TREATMENT: CPT

## 2020-07-07 RX ORDER — PREDNISONE 20 MG/1
40 TABLET ORAL DAILY
Qty: 6 TABLET | Refills: 0 | Status: SHIPPED | OUTPATIENT
Start: 2020-07-08 | End: 2020-07-11

## 2020-07-07 RX ORDER — ASPIRIN 81 MG/1
81 TABLET ORAL NIGHTLY
Qty: 30 TABLET | Refills: 1 | Status: SHIPPED | OUTPATIENT
Start: 2020-07-07

## 2020-07-07 RX ORDER — 0.9 % SODIUM CHLORIDE 0.9 %
1000 INTRAVENOUS SOLUTION INTRAVENOUS ONCE
Status: COMPLETED | OUTPATIENT
Start: 2020-07-07 | End: 2020-07-07

## 2020-07-07 RX ADMIN — PANTOPRAZOLE SODIUM 40 MG: 40 TABLET, DELAYED RELEASE ORAL at 05:17

## 2020-07-07 RX ADMIN — PREDNISONE 40 MG: 20 TABLET ORAL at 09:56

## 2020-07-07 RX ADMIN — SODIUM CHLORIDE 1000 ML: 9 INJECTION, SOLUTION INTRAVENOUS at 09:55

## 2020-07-07 RX ADMIN — THERA TABS 1 TABLET: TAB at 09:56

## 2020-07-07 RX ADMIN — GLYCOPYRROLATE AND FORMOTEROL FUMARATE 2 PUFF: 9; 4.8 AEROSOL, METERED RESPIRATORY (INHALATION) at 10:20

## 2020-07-07 RX ADMIN — ALLOPURINOL 300 MG: 300 TABLET ORAL at 09:56

## 2020-07-07 RX ADMIN — CLOPIDOGREL BISULFATE 75 MG: 75 TABLET ORAL at 09:49

## 2020-07-07 RX ADMIN — METOPROLOL SUCCINATE 25 MG: 25 TABLET, FILM COATED, EXTENDED RELEASE ORAL at 09:56

## 2020-07-07 RX ADMIN — SODIUM CHLORIDE: 9 INJECTION, SOLUTION INTRAVENOUS at 05:13

## 2020-07-07 RX ADMIN — HYDROCHLOROTHIAZIDE 12.5 MG: 25 TABLET ORAL at 09:56

## 2020-07-07 RX ADMIN — IPRATROPIUM BROMIDE AND ALBUTEROL SULFATE 1 AMPULE: .5; 3 SOLUTION RESPIRATORY (INHALATION) at 10:17

## 2020-07-07 RX ADMIN — LISINOPRIL 10 MG: 10 TABLET ORAL at 09:56

## 2020-07-07 RX ADMIN — IPRATROPIUM BROMIDE AND ALBUTEROL SULFATE 1 AMPULE: .5; 3 SOLUTION RESPIRATORY (INHALATION) at 13:41

## 2020-07-07 ASSESSMENT — PAIN SCALES - GENERAL
PAINLEVEL_OUTOF10: 0

## 2020-07-07 NOTE — CARE COORDINATION
7/7/20, 11:44 AM EDT    Patient goals/plan/ treatment preferences discussed by  and . Patient goals/plan/ treatment preferences reviewed with patient/ family. Patient/ family verbalize understanding of discharge plan and are in agreement with goal/plan/treatment preferences. Understanding was demonstrated using the teach back method. AVS provided by RN at time of discharge, which includes all necessary medical information pertaining to the patients current course of illness, treatment, post-discharge goals of care, and treatment preferences.         IMM Letter  IMM Letter date given[de-identified] 07/06/20

## 2020-07-07 NOTE — DISCHARGE SUMMARY
Hospitalist Discharge Summary    Patient: Claudia Oneill  YOB: 1950  MRN: [id number]   Acct: [de-identified]    Primary Care Physician: Ada Mcpherson date  7/5/2020    Discharge date:  7/7/2020  Disposition: Home       Discharge Assessment and Plan:    1. Acute COPD exacerbation: Pt presented to ER in respiratory distress, without hypoxia. 2 rounds of albuterol and DuoNeb therapy was provided along with IV Solu-Medrol. Expiratory wheezing noted upon auscultation. No acute findings on CXR.   - SOB resolved, wheezing improved. Pt transitioned to PO prednisone 7/6, will continue for 3 more days. Resume home Stiolto Respimat. Pt follows with Tia Li CNP Pulmonary, will schedule follow up in 1-2 weeks. 2. Elevated lactic acid: Pt does not appear to have any underlying infection; no home meds that may be contributing. Etiology unclear. Likely d/t dehydration, PACO, with poor PO intake prior to arrival. Improved s/p fluid resuscitation. Lactate is still mildly elevated, 2.3. Pt has remained stable, labs are otherwise unimpressive. Discussed that he will need to repeat labs tomorrow and follow up with his PCP within 2-3 days. Pts PCP agrees that the patient is reliable and that he is complaint with follow up. Explained to return to ED if any fever/chills, or new symptoms develop. Will repeat lactic acid, BMP with Mg, CBC tomorrow. 3. PACO on CKD stage II: Resolved s/p aggressive IV hydration. Repeat BMP as OP and follow up with PCP. 4. Hx CAD: S/p stent placement in March 2019. Continue ASA/Plavix/Statin. Follow up with cardiology as scheduled. 5. Essential HTN: stable, currently normotensive. Continue home meds and follow up with PCP. 6. HLD: Continue atorvastatin nightly. 7. Gout: History. Cont allopurinol.          Chief Complaint on presentation: SOB    Initial H&P / Hospital Course: HPI: \"Markel Salas is a 71-year-old  male, reformed smoker, with a medical history of arthritis, history of prostate cancer, COPD, history of an elevated PSA, gout, hypertension, and a history of a stent to the LAD in 2019. Patient presented [facility] ER with complaints of shortness of breath.  Patient stated that he first started to notice his shortness of breath on Thursday/Friday after spending approximately 1 week in [place] with family. [facility] states that he did a substantial amount of hiking and physical activity, more than he normally does at home, stating that he is sedentary for the most part, questioning if he overdid things. Ernesto Buck reports dyspnea with exertion as well as a chronic productive cough without sputum production at this time.  Patient denies any recent exposure to sick contacts, fever, chills, chest pain, abdominal pain, nausea, vomiting, diarrhea, constipation, urinary complaints, lightheadedness, dizziness, headaches, changes in vision, fever, or chills.  The patient did report that he smoked for approximately 50 years and quit back in January.  Patient reports that he follows with Jami Gonzalez CNP for pulmonology reporting that he saw her in the office about a half a year ago. [facility] stated that his sleep study was performed which was found to be negative and has intermittently had episodes of shortness of breath since he quit smoking.  He did state that this episode is worse than the prior episodes. While in the ER, the patient received 1 DuoNeb and 2 albuterol treatments as well as IV Solu-Medrol and Rocephin.  The patient was placed on CPAP and found to have tolerated the assistance well.  After 2 hours on the CPAP, the patient reported that his breathing is significantly better and the patient visibly looks more comfortable.  Lab work revealed a mild leukocytosis with a WBC count of 11 and chemistry demonstrated a creatinine level of 1.6, indicative of an acute kidney injury.  The patient was admitted for a COPD exacerbation, shortness of breath, and acute respiratory failure with hypoxia.  The hospitalist service was contacted for further evaluation, treatment, and management. \"      Subjective (day of discharge): Pt is doing great clinically. SOB resolved, wheezing on exam is improved. He denies fever/chills, SOB, CP, abdominal pain, n/v/d, urinary sx. Continued mild, chronic nonproductive cough. Discussed elevated lactic acid in detail with the patient. He agrees to repeat testing tomorrow and follow up with his PCP. Pt is discharged in stable condition with appropriate follow up. Physical Exam:-  Vitals:   Patient Vitals for the past 24 hrs:   BP Temp Temp src Pulse Resp SpO2   07/07/20 1151 114/63 97.7 °F (36.5 °C) Oral 78 16 95 %   07/07/20 1017 -- -- -- -- -- 93 %   07/07/20 0930 114/67 97.7 °F (36.5 °C) Oral -- 14 93 %   07/07/20 0445 (!) 105/56 97.7 °F (36.5 °C) Oral 76 16 92 %   07/06/20 2337 (!) 102/55 97.7 °F (36.5 °C) Oral 77 16 95 %   07/06/20 2043 (!) 97/55 97.6 °F (36.4 °C) Oral 79 16 93 %   07/06/20 2033 -- -- -- -- 16 93 %     Weight: Weight: 216 lb 8 oz (98.2 kg)   24 hour intake/output:     Intake/Output Summary (Last 24 hours) at 7/7/2020 1643  Last data filed at 7/7/2020 0449  Gross per 24 hour   Intake 2306.88 ml   Output 975 ml   Net 1331.88 ml       General appearance: No apparent distress, appears stated age and cooperative. HEENT: Normal cephalic, atraumatic without obvious deformity. Pupils equal, round, and reactive to light. Extra ocular muscles intact. Conjunctivae/corneas clear. Neck: Supple, with full range of motion. No jugular venous distention. Trachea midline. Respiratory:  Normal respiratory effort. Mild expiratory wheezes. Cardiovascular: Regular rate and rhythm with normal S1/S2 without murmurs, rubs or gallops. Abdomen: Soft, non-tender, non-distended with normal bowel sounds. Musculoskeletal:  No clubbing, cyanosis or edema bilaterally.   Skin: Skin color, texture, turgor normal.  No rashes 0.3 0.2 - 1.0 mg/dl    ALT 57 14 - 63 U/L   Glomerular Filtration Rate, Estimated    Collection Time: 07/05/20  1:42 PM   Result Value Ref Range    GFR, Estimated 46 (A) ml/min/1.73m2   ANION GAP    Collection Time: 07/05/20  1:42 PM   Result Value Ref Range    Anion Gap 12.0 8.0 - 16.0 meq/l   COVID-19    Collection Time: 07/05/20  5:45 PM   Result Value Ref Range    SARS-CoV-2, NAAT NOT DETECTED NOT DETECT   Rapid influenza A/B antigens    Collection Time: 07/05/20  7:20 PM   Result Value Ref Range    Flu A Antigen Negative NEGATIVE    Flu B Antigen Negative NEGATIVE   Lactic Acid, Plasma    Collection Time: 07/05/20  7:30 PM   Result Value Ref Range    Lactic Acid 1.4 0.5 - 2.2 mmol/L   Culture, Blood 1    Collection Time: 07/05/20  7:30 PM   Result Value Ref Range    Blood Culture, Routine No growth-preliminary     Culture, Blood 2    Collection Time: 07/05/20  7:35 PM   Result Value Ref Range    Blood Culture, Routine No growth-preliminary     Basic Metabolic Panel w/ Reflex to MG    Collection Time: 07/06/20  5:58 AM   Result Value Ref Range    Sodium 140 135 - 145 meq/L    Potassium reflex Magnesium 4.6 3.5 - 5.2 meq/L    Chloride 106 98 - 111 meq/L    CO2 22 (L) 23 - 33 meq/L    Glucose 185 (H) 70 - 108 mg/dL    BUN 21 7 - 22 mg/dL    CREATININE 1.2 0.4 - 1.2 mg/dL    Calcium 8.5 8.5 - 10.5 mg/dL   Lactic acid, plasma    Collection Time: 07/06/20  5:58 AM   Result Value Ref Range    Lactic Acid 2.3 (H) 0.5 - 2.2 mmol/L   CBC auto differential    Collection Time: 07/06/20  5:58 AM   Result Value Ref Range    WBC 12.1 (H) 4.8 - 10.8 thou/mm3    RBC 4.23 (L) 4.70 - 6.10 mill/mm3    Hemoglobin 12.9 (L) 14.0 - 18.0 gm/dl    Hematocrit 39.6 (L) 42.0 - 52.0 %    MCV 93.6 80.0 - 94.0 fL    MCH 30.5 26.0 - 33.0 pg    MCHC 32.6 32.2 - 35.5 gm/dl    RDW-CV 14.6 (H) 11.5 - 14.5 %    RDW-SD 49.6 (H) 35.0 - 45.0 fL    Platelets 375 693 - 864 thou/mm3    MPV 11.8 9.4 - 12.4 fL    Seg Neutrophils 90.5 %    Lymphocytes 6.8 % Monocytes 1.8 %    Eosinophils 0.2 %    Basophils 0.2 %    Immature Granulocytes 0.5 %    Segs Absolute 11.0 (H) 1.8 - 7.7 thou/mm3    Lymphocytes Absolute 0.8 (L) 1.0 - 4.8 thou/mm3    Monocytes Absolute 0.2 (L) 0.4 - 1.3 thou/mm3    Eosinophils Absolute 0.0 0.0 - 0.4 thou/mm3    Basophils Absolute 0.0 0.0 - 0.1 thou/mm3    Immature Grans (Abs) 0.06 0.00 - 0.07 thou/mm3    nRBC 0 /100 wbc   Anion Gap    Collection Time: 07/06/20  5:58 AM   Result Value Ref Range    Anion Gap 12.0 8.0 - 16.0 meq/L   Glomerular Filtration Rate, Estimated    Collection Time: 07/06/20  5:58 AM   Result Value Ref Range    Est, Glom Filt Rate 60 (A) ml/min/1.73m2   Lactic Acid, Plasma    Collection Time: 07/06/20 11:22 AM   Result Value Ref Range    Lactic Acid 4.2 (H) 0.5 - 2.2 mmol/L   Lactic Acid, Plasma    Collection Time: 07/06/20  5:15 PM   Result Value Ref Range    Lactic Acid 2.9 (H) 0.5 - 2.2 mmol/L   Lactic Acid, Plasma    Collection Time: 07/06/20 11:15 PM   Result Value Ref Range    Lactic Acid 2.6 (H) 0.5 - 2.2 mmol/L   Lactic Acid, Plasma    Collection Time: 07/07/20  5:29 AM   Result Value Ref Range    Lactic Acid 2.3 (H) 0.5 - 2.2 mmol/L   Lactic Acid, Plasma    Collection Time: 07/07/20 12:12 PM   Result Value Ref Range    Lactic Acid 2.4 (H) 0.5 - 2.2 mmol/L        Microbiology:    Blood culture #1:   Lab Results   Component Value Date    BC No growth-preliminary  07/05/2020     Blood culture #2:No results found for: BLOODCULT2  Organism:    Lab Results   Component Value Date    LABGRAM  02/24/2020     Few segmented neutrophils observed. Rare epithelial cells observed. No bacteria seen.         MRSA culture only:No results found for: [facility]  Urine culture:   Lab Results   Component Value Date    LABURIN No growth-preliminary No growth  02/24/2020     No results found for: ORG   Respiratory culture: No results found for: CULTRESP  Aerobic and Anaerobic :  No results found for: LABAERO  No results found for: LABISA    Urinalysis:     Lab Results   Component Value Date    NITRU NEGATIVE 10/03/2017    WBCUA NONE 10/03/2017    BACTERIA FEW 10/03/2017    RBCUA 0-2 10/03/2017    BLOODU small 12/05/2017    BLOODU TRACE 10/03/2017    SPECGRAV 1.020 10/03/2017    GLUCOSEU Negative 09/21/2017       Radiology:  Xr Chest Portable    Result Date: 7/5/2020  PROCEDURE: XR CHEST PORTABLE CLINICAL INFORMATION: Shortness of breath TECHNIQUE: Mobile AP chest radiograph. COMPARISON: PA and lateral chest radiographs 2/27/2020 FINDINGS: Cardiomediastinal silhouette is within normal limits. Stable reticular opacities at the left lung base are likely secondary to scarring. There are no lung consolidations. Degenerative changes in the thoracic spine are poorly visualized. No acute intrathoracic process. **This report has been created using voice recognition software. It may contain minor errors which are inherent in voice recognition technology. ** Final report electronically signed by Dr. Josemanuel Huntley on 7/5/2020 2:59 PM       Consults:   None    Discharge Medications:      Medication List      START taking these medications    aspirin 81 MG EC tablet  Take 1 tablet by mouth nightly     predniSONE 20 MG tablet  Commonly known as:  DELTASONE  Take 2 tablets by mouth daily for 3 days  Start taking on:  July 8, 2020  Notes to patient:  Steroid, reduces inflammation in lungs to improve breathing        CONTINUE taking these medications    albuterol (2.5 MG/3ML) 0.083% nebulizer solution  Commonly known as:  PROVENTIL  Take 3 mLs by nebulization every 4 hours as needed for Wheezing     allopurinol 300 MG tablet  Commonly known as:  ZYLOPRIM     atorvastatin 40 MG tablet  Commonly known as:  LIPITOR     diphenhydrAMINE 25 MG tablet  Commonly known as:  BENADRYL     Glucosamine 500 MG Caps     lisinopril-hydroCHLOROthiazide 10-12.5 MG per tablet  Commonly known as:  PRINZIDE;ZESTORETIC     metoprolol succinate 25 MG extended release tablet  Commonly known as:  TOPROL XL     Nebulizer Compressor Kit  1 kit by Does not apply route once for 1 dose     omeprazole 20 MG delayed release capsule  Commonly known as:  PRILOSEC  Take 1 capsule by mouth every morning (before breakfast)     PLAVIX PO     Tiotropium Bromide-Olodaterol 2.5-2.5 MCG/ACT Aers  Commonly known as:  Stiolto Respimat  Inhale 2 puffs into the lungs daily  Notes to patient:  FYI: checked your insurance for coverage of Bevespi. Not covered. Preferred inhalers appear to be Breo, Advair, Anoro     VITAMIN B COMPLEX PO           Where to Get Your Medications      These medications were sent to [address] [place], [place] [address]jacey Alondra, [address] [phone] - f [phone] [facility], [address]    Phone:  [phone]   · aspirin 81 MG EC tablet  · predniSONE 20 MG tablet          Patient Instructions:    Discharge lab work: BMP with Mg, Lactic acid, CBC  Activity: activity as tolerated  Diet: No diet orders on file      Follow-up visits:   Delma Huizar  [address] [address]  [phone]    On 7/14/2020  08:30    [facility], [address] [place] [address].  [address] [place] [address]    On 7/20/2020  COPD exacerbation 08:30         Disposition: home  Condition at Discharge: Stable    Time Spent: 70 minutes    Signed: Thank you Delma Huizar for the opportunity to be involved in this patient's care.     Electronically signed by Archie Lew PA-C on 7/7/2020 at 4:43 PM  Discharging Hospitalist

## 2020-07-07 NOTE — PLAN OF CARE
Duoneb aerosol and [facility] LISBETH MDI given as ordered.   Pt tolerated well with no adverse reactions noted

## 2020-07-07 NOTE — PLAN OF CARE
Continue breathing treatments to help improve lung sounds.   Problem: RESPIRATORY  Goal: Clear lung sounds  Outcome: Ongoing

## 2020-07-07 NOTE — PROGRESS NOTES
Patient given discharge instructions and AVS. Patient and wife voiced understanding of discharged instructions. Patient stated he would call PCP to schedule a follow up appointment the day after his Lactic lab draw. Patient discharged in stable conditions with wife via wheelchair.

## 2020-07-08 ENCOUNTER — CARE COORDINATION (OUTPATIENT)
Dept: CASE MANAGEMENT | Age: 70
End: 2020-07-08

## 2020-07-08 NOTE — CARE COORDINATION
Patient contacted regarding Kati Todd. Discussed COVID-19 related testing which was available at this time. Test results were negative on 20. Patient informed of results, if available? Yes    Care Transition Nurse/ Ambulatory Care Manager contacted the family by telephone to perform post discharge assessment. Verified name and  with family as identifiers. Provided introduction to self, and explanation of the CTN/ACM role, and reason for call due to risk factors for infection and/or exposure to COVID-19. Symptoms reviewed with family who verbalized NONE of the following symptoms: fever, fatigue, pain or aching joints, cough, shortness of breath, chills or shaking, sweating, nausea, vomiting, diarrhea, chest pain, fast heart rate, dizziness/lightheadedness, no new symptoms and no worsening symptoms. Due to no new or worsening symptoms encounter was not routed to provider for escalation. Discussed follow-up appointments. If no appointment was previously scheduled, appointment scheduling offered: Yes  [facility] follow up appointment(s):   Future Appointments   Date Time Provider Nirali Le   2020  8:30 AM [facility] [place] [facility]   2021  4:00 PM STR CT IMAGING RM1 STR CT SCAN STR Radiolog   2021  4:30 PM STR PULMONARY FUNCTION ROOM 1 [facility] PFT [facility] [place] [facility]   2021 11:30 AM AIDA Fulton CNP Pul Med [facility] [place]     [facility] follow up appointment(s): DR Dale Sanchez 20  Pt PCP     Patient has following risk factors of: COPD and acute respiratory failure. CTN/ACM reviewed discharge instructions, medical action plan and red flags such as increased shortness of breath, increasing fever and signs of decompensation with patient who verbalized understanding. Discussed exposure protocols and quarantine with CDC Guidelines What to do if you are sick with coronavirus disease .  Family was given an opportunity for questions and concerns.  The family agrees to contact the [facility] exposure line [phone], local health department [place] Department of Health: ([phone]) and PCP office for questions related to their healthcare. CTN/ACM provided contact information for future needs. Reviewed and educated family on any new and changed medications related to discharge diagnosis     Patient/family/caregiver given information for GetWell Loop and agrees to enroll yes  Patient's preferred e-mail: [facility]   Patient's preferred phone number: [phone]  Based on Loop alert triggers, patient will be contacted by nurse care manager for worsening symptoms. Pt will be further monitored by COVID Loop Team based on severity of symptoms and risk factors. Wife reported the pt is doing much better. Has not heard pt cough at all today. Denied any SOB, as been up ambulating in the house. Pt denied any needs or concerns.       [facility] Transition Nurse  [phone]

## 2020-07-11 LAB
BLOOD CULTURE, ROUTINE: NORMAL
BLOOD CULTURE, ROUTINE: NORMAL

## 2020-07-20 ENCOUNTER — OFFICE VISIT (OUTPATIENT)
Dept: PULMONOLOGY | Age: 70
End: 2020-07-20
Payer: MEDICARE

## 2020-07-20 VITALS
OXYGEN SATURATION: 97 % | WEIGHT: 219 LBS | TEMPERATURE: 97.2 F | BODY MASS INDEX: 29.66 KG/M2 | HEART RATE: 66 BPM | SYSTOLIC BLOOD PRESSURE: 124 MMHG | HEIGHT: 72 IN | DIASTOLIC BLOOD PRESSURE: 72 MMHG

## 2020-07-20 PROCEDURE — 4040F PNEUMOC VAC/ADMIN/RCVD: CPT | Performed by: NURSE PRACTITIONER

## 2020-07-20 PROCEDURE — 1036F TOBACCO NON-USER: CPT | Performed by: NURSE PRACTITIONER

## 2020-07-20 PROCEDURE — G8427 DOCREV CUR MEDS BY ELIG CLIN: HCPCS | Performed by: NURSE PRACTITIONER

## 2020-07-20 PROCEDURE — G8926 SPIRO NO PERF OR DOC: HCPCS | Performed by: NURSE PRACTITIONER

## 2020-07-20 PROCEDURE — 3017F COLORECTAL CA SCREEN DOC REV: CPT | Performed by: NURSE PRACTITIONER

## 2020-07-20 PROCEDURE — 3023F SPIROM DOC REV: CPT | Performed by: NURSE PRACTITIONER

## 2020-07-20 PROCEDURE — 1111F DSCHRG MED/CURRENT MED MERGE: CPT | Performed by: NURSE PRACTITIONER

## 2020-07-20 PROCEDURE — 99214 OFFICE O/P EST MOD 30 MIN: CPT | Performed by: NURSE PRACTITIONER

## 2020-07-20 PROCEDURE — 1123F ACP DISCUSS/DSCN MKR DOCD: CPT | Performed by: NURSE PRACTITIONER

## 2020-07-20 PROCEDURE — G8417 CALC BMI ABV UP PARAM F/U: HCPCS | Performed by: NURSE PRACTITIONER

## 2020-07-20 RX ORDER — UMECLIDINIUM BROMIDE AND VILANTEROL TRIFENATATE 62.5; 25 UG/1; UG/1
1 POWDER RESPIRATORY (INHALATION) DAILY
Qty: 30 PUFF | Refills: 11 | Status: SHIPPED | OUTPATIENT
Start: 2020-07-20 | End: 2021-04-26 | Stop reason: SDUPTHER

## 2020-07-20 ASSESSMENT — ENCOUNTER SYMPTOMS
COUGH: 1
SHORTNESS OF BREATH: 1
DIARRHEA: 0
HEMOPTYSIS: 0
CHEST TIGHTNESS: 0
NAUSEA: 0
VOMITING: 0
WHEEZING: 0
STRIDOR: 0
SPUTUM PRODUCTION: 1

## 2020-07-20 ASSESSMENT — COPD QUESTIONNAIRES: COPD: 1

## 2020-07-20 NOTE — PROGRESS NOTES
[facility]    Patient: Christiano Vickers, 71 y.o.   : 1950    Pt of [place]     Subjective     Chief Complaint   Patient presents with    Follow-Up from [facility] follow up discharged 2020 COPD exacerbation        COPD   He complains of cough, shortness of breath and sputum production. There is no hemoptysis or wheezing. This is a chronic problem. The current episode started more than 1 year ago. The problem occurs daily. The problem has been gradually improving (since discharge). The cough is productive of sputum (AM cough). Associated symptoms include dyspnea on exertion. Pertinent negatives include no chest pain or fever. His symptoms are aggravated by strenuous activity and change in weather. His symptoms are alleviated by beta-agonist and rest. He reports significant improvement on treatment. Risk factors for lung disease include smoking/tobacco exposure. His past medical history is significant for COPD. Saranya Camacho is here for follow up from recent hospitalization. Treated for COPD exacerbation with solumedrol and BD's inpatient 2 days and discharged home. Feels that he is about 90% back to normal. Reports good compliance with inhaled regimen. Reports prior to Admission he went on Vacation in [place] and was very active walking 8-9 hrs per day this is much more activity than he has been doing prior to vacation he feels that he started to have more issues as the vacation went on and went to ER after returning from vacation CXR was completed and showed no intrathoracic process. Patient was also treated for dehydration with evidence of elevated lactic acid and PACO on CKD. Smoked approx 50 years Quit in 2020    Reports good compliance with stiolto but states it is very expensive approx. 150 dollars a month. Progress History:   Since last visit any new medical issues? No  New ER or hospital visits?  Yes see above  Any new or mg by mouth daily      albuterol (PROVENTIL) (2.5 MG/3ML) 0.083% nebulizer solution Take 3 mLs by nebulization every 4 hours as needed for Wheezing 1 Package 0    lisinopril-hydrochlorothiazide (PRINZIDE;ZESTORETIC) 10-12.5 MG per tablet Take 1 tablet by mouth daily      Glucosamine 500 MG CAPS Take 1 capsule by mouth daily       B Complex Vitamins (VITAMIN B COMPLEX PO) Take 1 tablet by mouth daily.  Respiratory Therapy Supplies (NEBULIZER COMPRESSOR) KIT 1 kit by Does not apply route once for 1 dose 1 kit 0    diphenhydrAMINE (BENADRYL) 25 MG tablet Take 25 mg by mouth every 8 hours as needed for Itching or Allergies       No current facility-administered medications for this visit. Yanique HER   Review of Systems   Constitutional: Negative for chills, fever and unexpected weight change. Respiratory: Positive for cough, sputum production and shortness of breath. Negative for hemoptysis, chest tightness, wheezing and stridor. Cardiovascular: Positive for dyspnea on exertion. Negative for chest pain and leg swelling. Gastrointestinal: Negative for diarrhea, nausea and vomiting. Genitourinary: Negative for dysuria. Physical exam   /72 (Site: Left Upper Arm, Position: Sitting, Cuff Size: Large Adult)   Pulse 66   Temp 97.2 °F (36.2 °C)   Ht 6' (1.829 m)   Wt 219 lb (99.3 kg)   SpO2 97% Comment: on room air at rest  BMI 29.70 kg/m²    Wt Readings from Last 3 Encounters:   07/20/20 219 lb (99.3 kg)   07/06/20 216 lb 8 oz (98.2 kg)   05/21/20 220 lb (99.8 kg)       Physical Exam  Vitals signs and nursing note reviewed. Constitutional:       General: He is not in acute distress. Appearance: He is well-developed. HENT:      Head: Normocephalic and atraumatic. Neck:      Musculoskeletal: Neck supple. Trachea: No tracheal deviation. Cardiovascular:      Rate and Rhythm: Normal rate and regular rhythm. Heart sounds: Normal heart sounds. No murmur.    Pulmonary: call 911.  -Discussed with Nova Naylor patient wished to transfer care to me for personal reasons she agreed to have patient follow with me  -Discussed increasing exercising slowly overtime and to help combat weight gain reviewed low imapact activities   -Advised to maintain pneumonia vaccine with PCP and to take flu vaccine this coming season.  -Advised patient to call office with any changes, questions, or concerns regarding respiratory status    Will see Olamide Mayers back in: 3 months med check, Keep February with LDCT lung screening   Soham Valentine CNP  7/20/2020

## 2020-07-23 ENCOUNTER — OFFICE VISIT (OUTPATIENT)
Dept: PULMONOLOGY | Age: 70
End: 2020-07-23
Payer: MEDICARE

## 2020-07-23 VITALS
TEMPERATURE: 98.2 F | SYSTOLIC BLOOD PRESSURE: 122 MMHG | WEIGHT: 221 LBS | OXYGEN SATURATION: 98 % | BODY MASS INDEX: 29.93 KG/M2 | HEIGHT: 72 IN | HEART RATE: 79 BPM | DIASTOLIC BLOOD PRESSURE: 70 MMHG

## 2020-07-23 PROCEDURE — 1036F TOBACCO NON-USER: CPT | Performed by: NURSE PRACTITIONER

## 2020-07-23 PROCEDURE — 99212 OFFICE O/P EST SF 10 MIN: CPT | Performed by: NURSE PRACTITIONER

## 2020-07-23 PROCEDURE — 3023F SPIROM DOC REV: CPT | Performed by: NURSE PRACTITIONER

## 2020-07-23 PROCEDURE — G8427 DOCREV CUR MEDS BY ELIG CLIN: HCPCS | Performed by: NURSE PRACTITIONER

## 2020-07-23 PROCEDURE — 1111F DSCHRG MED/CURRENT MED MERGE: CPT | Performed by: NURSE PRACTITIONER

## 2020-07-23 PROCEDURE — 4040F PNEUMOC VAC/ADMIN/RCVD: CPT | Performed by: NURSE PRACTITIONER

## 2020-07-23 PROCEDURE — G8417 CALC BMI ABV UP PARAM F/U: HCPCS | Performed by: NURSE PRACTITIONER

## 2020-07-23 PROCEDURE — 1123F ACP DISCUSS/DSCN MKR DOCD: CPT | Performed by: NURSE PRACTITIONER

## 2020-07-23 PROCEDURE — G8926 SPIRO NO PERF OR DOC: HCPCS | Performed by: NURSE PRACTITIONER

## 2020-07-23 PROCEDURE — 3017F COLORECTAL CA SCREEN DOC REV: CPT | Performed by: NURSE PRACTITIONER

## 2020-07-23 ASSESSMENT — ENCOUNTER SYMPTOMS
SHORTNESS OF BREATH: 1
SPUTUM PRODUCTION: 1
VOMITING: 0
STRIDOR: 0
DIARRHEA: 0
WHEEZING: 1
HEMOPTYSIS: 0
NAUSEA: 0
COUGH: 1
CHEST TIGHTNESS: 0

## 2020-07-23 ASSESSMENT — COPD QUESTIONNAIRES: COPD: 1

## 2020-07-23 NOTE — PATIENT INSTRUCTIONS
-Discussed albuterol inhaler and nebulizer use. Reviewed signs and symptoms indicating need for use including Shortness of breath and wheezing. Discussed with patient the importance of using inhaler or nebulizer within the prescribed time frames. Patient verbalized understanding to use one or the other not both within prescribed time frame. Patient also verbalized understanding that if they experience no relief after using albuterol and resting for 15 minutes they need to go to nearest ER or call 911. Use albuterol 2-3 times per day while recovering from current respiratory illness for the next 5-7 days then can space out to prescribed dosing.

## 2020-07-23 NOTE — PROGRESS NOTES
[facility]    Patient: Remedios Cooper, 71 y.o.   : 1950    Pt of Dr. Susannah Arrieta   Patient presents with    Follow-up     Patient requesting appt for possible bronchitis, cough        COPD   He complains of cough, shortness of breath, sputum production and wheezing. There is no hemoptysis. This is a chronic problem. The current episode started more than 1 year ago. The problem occurs daily. The problem has been waxing and waning. The cough is productive of sputum (clear frothy mostly). Associated symptoms include postnasal drip. Pertinent negatives include no chest pain, dyspnea on exertion, fever or orthopnea. His symptoms are aggravated by strenuous activity, lying down and change in weather. His symptoms are alleviated by beta-agonist and rest. He reports significant improvement on treatment. Risk factors for lung disease include smoking/tobacco exposure. His past medical history is significant for COPD. Mara Trevino is here for follow up for COPD was concerned for worsening of COPD after he finished prednisone on Monday was laying down for bed last night and began to have very productive cough that disrupted his sleep and wanted to be seen today for sick evaluation today. Tried taking benadryl for his symptoms and reports that he was able to sleep This AM he awoke with some SOB and cough took his albuterol treatment prior to coming to the office and he reports that he is feeling better than this morning. He has not picked up his Anoro yet advised to call office regarding cost. Reports he has been doing home exercises as discussed on Monday and was feeling some improvement with completing them. Progress History:   Since last visit any new medical issues? No  New ER or hospital visits? No  Any new or changes in medicines? No  Using inhalers? Yes using remaining stiolto then switching to Anoro  Are they helpful?  Yes, used albuterol this AM with good benefit    Past Medical hx   PMH:  Past Medical History:   Diagnosis Date    Arthritis     Cancer [facility]) 2017    prostate cancer 8-    COPD exacerbation ([address] 2020    Elevated PSA     Gout     History of heart artery stent 2019    LAD with Dr. Efren Ellison at [facility]      SURGICAL HISTORY:  Past Surgical History:   Procedure Laterality Date    APPENDECTOMY      COLONOSCOPY      OTHER SURGICAL HISTORY  2013    mole removal    PROSTATECTOMY N/A 10/6/2017    ROBOTIC RADICAL PROSTATECTOMY, BILATERAL PELVIC LYMPH NODE DISSECTION performed by Edison Mcmanus MD at [place] [address]     SOCIAL HISTORY:  Social History     Tobacco Use    Smoking status: Former Smoker     Packs/day: 1.00     Years: 44.00     Pack years: 44.00     Types: Cigarettes     Last attempt to quit: 2020     Years since quittin.5    Smokeless tobacco: Never Used   Substance Use Topics    Alcohol use: Yes     Comment: 1-2 BEERS DAILY    Drug use: No     ALLERGIES:  Allergies   Allergen Reactions    Cephalosporins Nausea And Vomiting    Tetracyclines & Related Rash     FAMILY HISTORY:No family history on file.   CURRENT MEDICATIONS:  Current Outpatient Medications   Medication Sig Dispense Refill    aspirin 81 MG EC tablet Take 1 tablet by mouth nightly 30 tablet 1    omeprazole (PRILOSEC) 20 MG delayed release capsule Take 1 capsule by mouth every morning (before breakfast) 90 capsule 3    allopurinol (ZYLOPRIM) 300 MG tablet Take 300 mg by mouth daily      metoprolol succinate (TOPROL XL) 25 MG extended release tablet Take 25 mg by mouth daily      Clopidogrel Bisulfate (PLAVIX PO) Take 75 mg by mouth daily       atorvastatin (LIPITOR) 40 MG tablet Take 40 mg by mouth daily      albuterol (PROVENTIL) (2.5 MG/3ML) 0.083% nebulizer solution Take 3 mLs by nebulization every 4 hours as needed for Wheezing 1 Package 0    lisinopril-hydrochlorothiazide (PRINZIDE;ZESTORETIC) 10-12.5 MG per tablet Take 1 tablet by mouth daily      diphenhydrAMINE (BENADRYL) 25 MG tablet Take 25 mg by mouth every 8 hours as needed for Itching or Allergies      Glucosamine 500 MG CAPS Take 1 capsule by mouth daily       B Complex Vitamins (VITAMIN B COMPLEX PO) Take 1 tablet by mouth daily.  umeclidinium-vilanterol (ANORO ELLIPTA) 62.5-25 MCG/INH AEPB inhaler Inhale 1 puff into the lungs daily (Patient not taking: Reported on 7/23/2020) 30 puff 11    Respiratory Therapy Supplies (NEBULIZER COMPRESSOR) KIT 1 kit by Does not apply route once for 1 dose 1 kit 0     No current facility-administered medications for this visit. Michi HER   Review of Systems   Constitutional: Negative for chills, fever and unexpected weight change. HENT: Positive for postnasal drip. Respiratory: Positive for cough, sputum production, shortness of breath and wheezing. Negative for hemoptysis, chest tightness and stridor. Cardiovascular: Negative for chest pain, dyspnea on exertion and leg swelling. Gastrointestinal: Negative for diarrhea, nausea and vomiting. Genitourinary: Negative for dysuria. Physical exam   /70 (Site: Left Upper Arm, Position: Sitting, Cuff Size: Medium Adult)   Pulse 79   Temp 98.2 °F (36.8 °C)   Ht 6' (1.829 m)   Wt 221 lb (100.2 kg)   SpO2 98% Comment: on room air at rest  BMI 29.97 kg/m²    Wt Readings from Last 3 Encounters:   07/23/20 221 lb (100.2 kg)   07/20/20 219 lb (99.3 kg)   07/06/20 216 lb 8 oz (98.2 kg)       Physical Exam  Vitals signs and nursing note reviewed. Constitutional:       General: He is not in acute distress. Appearance: He is well-developed. HENT:      Head: Normocephalic and atraumatic. Neck:      Musculoskeletal: Neck supple. Trachea: No tracheal deviation. Cardiovascular:      Rate and Rhythm: Normal rate and regular rhythm. Heart sounds: Normal heart sounds.  No murmur. Pulmonary:      Effort: Pulmonary effort is normal. No respiratory distress. Breath sounds: Normal breath sounds. No stridor. No rhonchi or rales. Comments: Faint intermittent wheezing with upper airway predominance no stridor  Chest:      Chest wall: No tenderness. Abdominal:      General: Bowel sounds are normal. There is no distension. Palpations: Abdomen is soft. Skin:     General: Skin is warm and dry. Capillary Refill: Capillary refill takes less than 2 seconds. Neurological:      Mental Status: He is alert and oriented to person, place, and time. Psychiatric:         Behavior: Behavior normal.         Thought Content: Thought content normal.          Results   Lung Nodule Screening     [x] Qualifies    [] Does not qualify   [] Declined    [] Completed     The USPSTF recommends annual screening for lung cancer with low-dose computed tomography (LDCT) in adults aged 54 to [de-identified] years who have a 30 pack-year smoking history and currently smoke or have quit within the past 15 years. Screening should be discontinued once a person has not smoked for 15 years or develops a health problem that substantially limits life expectancy or the ability or willingness to have curative lung surgery. Assessment      Diagnosis Orders   1. COPD, moderate ([address]     2. Personal history of tobacco use           Plan   -Advised to use cetirizine for allergies instead of benadryl  -Going to  Anoro  -Discussed component of anxiety and utilizing pursed lip breathing during times of exertion to help aid in managing dyspnea  -Advised to use albuterol 2-3 times per day while recovering from current respiratory illness then resume PRN basis  -Discussed albuterol inhaler and nebulizer use. Reviewed signs and symptoms indicating need for use including Shortness of breath and wheezing. Discussed with patient the importance of using inhaler or nebulizer within the prescribed time frames.  Patient verbalized understanding to use one or the other not both within prescribed time frame.  Patient also verbalized understanding that if they experience no relief after using albuterol and resting for 15 minutes they need to go to nearest ER or call 911.  -Call office if has any worsening of symptoms  -Advised patient to call office with any changes, questions, or concerns regarding respiratory status    Will see Anant Ritter back in: Keep scheduled follow-up    Rey Obando CNP  7/23/2020

## 2020-10-20 ENCOUNTER — OFFICE VISIT (OUTPATIENT)
Dept: PULMONOLOGY | Age: 70
End: 2020-10-20
Payer: MEDICARE

## 2020-10-20 VITALS
SYSTOLIC BLOOD PRESSURE: 118 MMHG | TEMPERATURE: 97.9 F | DIASTOLIC BLOOD PRESSURE: 68 MMHG | HEART RATE: 76 BPM | BODY MASS INDEX: 29.66 KG/M2 | WEIGHT: 219 LBS | HEIGHT: 72 IN | OXYGEN SATURATION: 96 %

## 2020-10-20 PROCEDURE — 3017F COLORECTAL CA SCREEN DOC REV: CPT | Performed by: NURSE PRACTITIONER

## 2020-10-20 PROCEDURE — G8427 DOCREV CUR MEDS BY ELIG CLIN: HCPCS | Performed by: NURSE PRACTITIONER

## 2020-10-20 PROCEDURE — 99213 OFFICE O/P EST LOW 20 MIN: CPT | Performed by: NURSE PRACTITIONER

## 2020-10-20 PROCEDURE — G8484 FLU IMMUNIZE NO ADMIN: HCPCS | Performed by: NURSE PRACTITIONER

## 2020-10-20 PROCEDURE — G8417 CALC BMI ABV UP PARAM F/U: HCPCS | Performed by: NURSE PRACTITIONER

## 2020-10-20 PROCEDURE — 3023F SPIROM DOC REV: CPT | Performed by: NURSE PRACTITIONER

## 2020-10-20 PROCEDURE — 4040F PNEUMOC VAC/ADMIN/RCVD: CPT | Performed by: NURSE PRACTITIONER

## 2020-10-20 PROCEDURE — G8926 SPIRO NO PERF OR DOC: HCPCS | Performed by: NURSE PRACTITIONER

## 2020-10-20 PROCEDURE — 1036F TOBACCO NON-USER: CPT | Performed by: NURSE PRACTITIONER

## 2020-10-20 PROCEDURE — 1123F ACP DISCUSS/DSCN MKR DOCD: CPT | Performed by: NURSE PRACTITIONER

## 2020-10-20 ASSESSMENT — ENCOUNTER SYMPTOMS
SHORTNESS OF BREATH: 1
COUGH: 0
WHEEZING: 0
CHEST TIGHTNESS: 0
VOMITING: 0
STRIDOR: 0
NAUSEA: 0
DIARRHEA: 0

## 2020-10-20 ASSESSMENT — COPD QUESTIONNAIRES: COPD: 1

## 2020-10-20 NOTE — PROGRESS NOTES
[facility]    Patient: Tova Mcclendon, 79 y.o.   : 1950  10/20/2020    Pt of Dr. Liat Prince   Patient presents with    Follow-up      3 month f/u no tests     Other     Neck 18 mp 4        COPD   He complains of shortness of breath. There is no cough or wheezing. This is a chronic problem. The current episode started more than 1 year ago. The problem occurs daily. The problem has been gradually improving. Pertinent negatives include no chest pain or fever. His symptoms are aggravated by strenuous activity and change in weather. His symptoms are alleviated by beta-agonist and rest. He reports significant improvement on treatment. Risk factors for lung disease include smoking/tobacco exposure. His past medical history is significant for COPD. Wilver Chua is here for follow up for COPD. PMH significant for anxiety, CAD s/p stent , COPD, and prostate cancer in . Reports good compliance with inhaled medications. Using albuterol couple times per month. Reports overall breathing has been doing well on current regimen. Notes he has gained some weight since he quit smoking is trying to lose weight. Walks half mile per day. MMRC Dyspnea Scale:   0: Dyspneic on strenuous exercise  1: Dyspneic on walking a slight hill  2: Dyspneic on walking level ground; must stop occasionally due to breathlessness  3: Must stop for breathlessness after walking 100 yards or after a few minutes  4: Cannot leave house; breathless on dressing/undressing    MMRC dyspnea score: 1      Progress History:   Since last visit any new medical issues? No  New ER or hospital visits? No  Any new or changes in medicines? No  Using inhalers? Yes anoro  Are they helpful? Yes using albuterol   Flu vaccine? Up to date   Pneumonia vaccine?  Up to date  Past Medical hx   PMH:  Past Medical History:   Diagnosis Date    Arthritis     Cancer [facility]) 2017    prostate cancer lisinopril-hydrochlorothiazide (PRINZIDE;ZESTORETIC) 10-12.5 MG per tablet Take 1 tablet by mouth daily      diphenhydrAMINE (BENADRYL) 25 MG tablet Take 25 mg by mouth every 8 hours as needed for Itching or Allergies      Glucosamine 500 MG CAPS Take 1 capsule by mouth daily       B Complex Vitamins (VITAMIN B COMPLEX PO) Take 1 tablet by mouth daily.  Respiratory Therapy Supplies (NEBULIZER COMPRESSOR) KIT 1 kit by Does not apply route once for 1 dose 1 kit 0     No current facility-administered medications for this visit. Ric HER   Review of Systems   Constitutional: Negative for chills, fever and unexpected weight change. Respiratory: Positive for shortness of breath. Negative for cough, chest tightness, wheezing and stridor. Cardiovascular: Negative for chest pain and leg swelling. Gastrointestinal: Negative for diarrhea, nausea and vomiting. Genitourinary: Negative for dysuria. Physical exam   /68 (Site: Left Upper Arm, Position: Sitting, Cuff Size: Medium Adult)   Pulse 76   Temp 97.9 °F (36.6 °C)   Ht 6' (1.829 m)   Wt 219 lb (99.3 kg)   SpO2 96% Comment: room air at rest  BMI 29.70 kg/m²    Wt Readings from Last 3 Encounters:   10/20/20 219 lb (99.3 kg)   07/23/20 221 lb (100.2 kg)   07/20/20 219 lb (99.3 kg)       Physical Exam  Vitals signs and nursing note reviewed. Constitutional:       General: He is not in acute distress. Appearance: He is well-developed. HENT:      Head: Normocephalic and atraumatic. Neck:      Musculoskeletal: Neck supple. Trachea: No tracheal deviation. Cardiovascular:      Rate and Rhythm: Normal rate and regular rhythm. Heart sounds: Normal heart sounds. No murmur. Pulmonary:      Effort: Pulmonary effort is normal. No respiratory distress. Breath sounds: Normal breath sounds. No stridor. No wheezing or rales. Chest:      Chest wall: No tenderness.    Abdominal:      General: Bowel sounds are normal. There is no distension. Palpations: Abdomen is soft. Skin:     General: Skin is warm and dry. Capillary Refill: Capillary refill takes less than 2 seconds. Neurological:      Mental Status: He is alert and oriented to person, place, and time. Psychiatric:         Behavior: Behavior normal.         Thought Content: Thought content normal.          Results   Lung Nodule Screening     [x] Qualifies    [] Does not qualify   [] Declined    [] Completed   Qualifies scheduled January 2021     The USPSTF recommends annual screening for lung cancer with low-dose computed tomography (LDCT) in adults aged 54 to 80 years who have a 30 pack-year smoking history and currently smoke or have quit within the past 15 years. Screening should be discontinued once a person has not smoked for 15 years or develops a health problem that substantially limits life expectancy or the ability or willingness to have curative lung surgery. Assessment      Diagnosis Orders   1. COPD, moderate ([address]     2. Personal history of tobacco use           Plan   -Doing well on current regimen continue Anoro   -Discussed albuterol inhaler use. Reviewed signs and symptoms indicating need for use including Shortness of breath and wheezing. Discussed with patient the importance of using inhaler within the prescribed time frames. Patient verbalized understanding to use within prescribed time frame.  Patient also verbalized understanding that if they experience no relief after using albuterol and resting for 15 minutes they need to go to nearest ER or call 911.  -Will maintain LDCT screening LDCT scheduled 1 year after last CT of chest  -Advised to maintain pneumonia vaccine with PCP and to take flu vaccine this coming season.  -Advised patient to call office with any changes, questions, or concerns regarding respiratory status    Will see Sukumar Diaz back in: 4 months with LDCT  Conception Antes CNP  10/20/2020

## 2020-10-31 ENCOUNTER — HOSPITAL ENCOUNTER (EMERGENCY)
Age: 70
Discharge: HOME | End: 2020-10-31
Attending: FAMILY MEDICINE
Payer: MEDICARE

## 2020-10-31 VITALS
TEMPERATURE: 98.9 F | HEIGHT: 72 IN | WEIGHT: 220 LBS | OXYGEN SATURATION: 94 % | BODY MASS INDEX: 29.8 KG/M2 | SYSTOLIC BLOOD PRESSURE: 142 MMHG | RESPIRATION RATE: 20 BRPM | DIASTOLIC BLOOD PRESSURE: 70 MMHG | HEART RATE: 95 BPM

## 2020-10-31 LAB
FLU A ANTIGEN: NEGATIVE
FLU B ANTIGEN: NEGATIVE

## 2020-10-31 PROCEDURE — U0003 INFECTIOUS AGENT DETECTION BY NUCLEIC ACID (DNA OR RNA); SEVERE ACUTE RESPIRATORY SYNDROME CORONAVIRUS 2 (SARS-COV-2) (CORONAVIRUS DISEASE [COVID-19]), AMPLIFIED PROBE TECHNIQUE, MAKING USE OF HIGH THROUGHPUT TECHNOLOGIES AS DESCRIBED BY CMS-2020-01-R: HCPCS

## 2020-10-31 PROCEDURE — 99283 EMERGENCY DEPT VISIT LOW MDM: CPT

## 2020-10-31 PROCEDURE — 87804 INFLUENZA ASSAY W/OPTIC: CPT

## 2020-10-31 ASSESSMENT — ENCOUNTER SYMPTOMS
WHEEZING: 0
VOMITING: 0
SHORTNESS OF BREATH: 0
ABDOMINAL PAIN: 0
SORE THROAT: 0
BACK PAIN: 0
DIARRHEA: 0
COUGH: 1
NAUSEA: 0

## 2020-10-31 NOTE — ED NOTES
Pt presents to the front window with complaints of cough and fever. He has the chills since last night. Patient has a history of COPD and is worried about the fever.       Mahi Holcomb RN  10/31/20 [id number]

## 2020-11-01 NOTE — ED PROVIDER NOTES
[address]/[facility] COMPLAINT       Chief Complaint   Patient presents with    Cough    Fever       Nurses Notes reviewed and I agree except as noted in the HPI. HISTORY OF PRESENT ILLNESS    April Orozco is a 79 y.o. male who presents for evaluation of cough and fever. He developed symptoms starting yesterday. His max temperature was 101. 6. He took no antipyretics but did take a couple of Benadryl tablets yesterday. Cough is productive. He has a history of COPD and he takes his medication as directed. He states that he required 1 nebulizer treatment today. He denies having any wheezing or shortness of breath. No chest tightness or chest pain. He has had some myalgias but no ear pain or sore throat. No abdominal complaints or change in bowel habits. REVIEW OF SYSTEMS     Review of Systems   Constitutional: Positive for fever. Negative for activity change, appetite change and chills. HENT: Negative for ear pain and sore throat. Respiratory: Positive for cough. Negative for shortness of breath and wheezing. Cardiovascular: Negative for chest pain and leg swelling. Gastrointestinal: Negative for abdominal pain, diarrhea, nausea and vomiting. Genitourinary: Negative for dysuria, flank pain and hematuria. Musculoskeletal: Positive for myalgias. Negative for arthralgias, back pain, gait problem and neck pain. Skin: Negative for rash and wound. Neurological: Negative for weakness, light-headedness and headaches. Psychiatric/Behavioral: Negative for agitation and hallucinations. The patient is not nervous/anxious. PAST MEDICAL HISTORY    has a past medical history of Anxiety, Arthritis, Cancer ([address] COPD exacerbation ([facility] Elevated PSA, Gout, History of heart artery stent, and Hypertension. SURGICAL HISTORY      has a past surgical history that includes other surgical history (2013);  Tonsillectomy (1950's); Appendectomy ('s); Colonoscopy (); and Prostatectomy (N/A, 10/6/2017). CURRENT MEDICATIONS       Discharge Medication List as of 10/31/2020  8:29 PM      CONTINUE these medications which have NOT CHANGED    Details   umeclidinium-vilanterol (ANORO ELLIPTA) 62.5-25 MCG/INH AEPB inhaler Inhale 1 puff into the lungs daily, Disp-30 puff,R-11Normal      aspirin 81 MG EC tablet Take 1 tablet by mouth nightly, Disp-30 tablet, R-1Normal      omeprazole (PRILOSEC) 20 MG delayed release capsule Take 1 capsule by mouth every morning (before breakfast), Disp-90 capsule, R-3Normal      allopurinol (ZYLOPRIM) 300 MG tablet Take 300 mg by mouth dailyHistorical Med      metoprolol succinate (TOPROL XL) 25 MG extended release tablet Take 25 mg by mouth dailyHistorical Med      Clopidogrel Bisulfate (PLAVIX PO) Take 75 mg by mouth daily Historical Med      atorvastatin (LIPITOR) 40 MG tablet Take 40 mg by mouth dailyHistorical Med      albuterol (PROVENTIL) (2.5 MG/3ML) 0.083% nebulizer solution Take 3 mLs by nebulization every 4 hours as needed for Wheezing, Disp-1 Package, R-0Print      Respiratory Therapy Supplies (NEBULIZER COMPRESSOR) KIT ONCE Starting Mon 2020, For 1 dose, Disp-1 kit, R-0, Print      lisinopril-hydrochlorothiazide (PRINZIDE;ZESTORETIC) 10-12.5 MG per tablet Take 1 tablet by mouth dailyHistorical Med      diphenhydrAMINE (BENADRYL) 25 MG tablet Take 25 mg by mouth every 8 hours as needed for Itching or AllergiesHistorical Med      Glucosamine 500 MG CAPS Take 1 capsule by mouth daily Historical Med      B Complex Vitamins (VITAMIN B COMPLEX PO) Take 1 tablet by mouth daily. ALLERGIES     is allergic to echinacea-baker seal [nutritional supplements]; cephalosporins; and tetracyclines & related. FAMILY HISTORY     He indicated that his mother is alive. He indicated that his father is . family history is not on file.     SOCIAL HISTORY      reports that he quit smoking about 10 months ago. His smoking use included cigarettes. He has a 44.00 pack-year smoking history. He has never used smokeless tobacco. He reports current alcohol use. He reports that he does not use drugs. PHYSICAL EXAM     INITIAL VITALS:  height is 6' (1.829 m) and weight is 220 lb (99.8 kg). His temporal temperature is 98.9 °F (37.2 °C). His blood pressure is 142/70 (abnormal) and his pulse is 95. His respiration is 20 and oxygen saturation is 94%. Physical Exam  Vitals signs and nursing note reviewed. Constitutional:       General: He is not in acute distress. Appearance: He is not diaphoretic. Cardiovascular:      Rate and Rhythm: Normal rate and regular rhythm. Heart sounds: Normal heart sounds. Pulmonary:      Effort: Pulmonary effort is normal.      Breath sounds: Normal breath sounds. Abdominal:      General: Bowel sounds are normal. There is no distension. Palpations: Abdomen is soft. Tenderness: There is no abdominal tenderness. Lymphadenopathy:      Cervical: No cervical adenopathy. Skin:     General: Skin is warm and dry. Neurological:      Mental Status: He is alert and oriented to person, place, and time. DIFFERENTIAL DIAGNOSIS:   Viral URI, COPD exacerbation, COVID-19, influenza    DIAGNOSTIC RESULTS         LABS:   Labs Reviewed   RAPID INFLUENZA A/B ANTIGENS   COVID-19   COVID-19       DEPARTMENT COURSE:   Vitals:    Vitals:    10/31/20 1910   BP: (!) 142/70   Pulse: 95   Resp: 20   Temp: 98.9 °F (37.2 °C)   TempSrc: Temporal   SpO2: 94%   Weight: 220 lb (99.8 kg)   Height: 6' (1.829 m)       MDM:  Patient presents for evaluation of cough and fever. Patient's examination is normal with lung sounds clear to auscultation bilaterally. X-ray imaging not deemed appropriate today. He has tested for influenza and Covid 19. He will be contacted with any positive test results.   He is recommended to use antipyretics as needed, continue his COPD medications, and follow-up for symptom worsening or for evaluation of new concerning symptoms. Patient blood pressure was noted to be mildly elevated while in the department. He is recommended to take his antihypertensive as directed. He does have history of hypertension. CRITICAL CARE:   None    CONSULTS:  None    PROCEDURES:  None    FINAL IMPRESSION      1.  Viral illness          DISPOSITION/PLAN   Discharge    PATIENT REFERRED TO:  Fabiola Valentine  [address] [place] [address] [address] [place] [address],[address]  [phone]    Call in 1 week  As needed      DISCHARGEMEDICATIONS:  Discharge Medication List as of 10/31/2020  8:29 PM          (Please note that portions of this note were completedwith a voice recognition program.  Efforts were made to edit the dictations but occasionally words are mis-transcribed.)    MD Mc Smith MD  10/31/20 [id number]

## 2020-11-02 ENCOUNTER — CARE COORDINATION (OUTPATIENT)
Dept: CARE COORDINATION | Age: 70
End: 2020-11-02

## 2020-11-02 NOTE — CARE COORDINATION
Attempted outreach for [id number] monitoring. Left message to return phone call to ambulatory care manager at [phone]. 2nd attempt to reach for covid19 monitoring. Left message to return phone call to ambulatory care manager at [phone]. If no call back, unable to complete follow up.

## 2020-11-03 LAB
PERFORMING LAB: ABNORMAL
REPORT: ABNORMAL
SARS-COV-2: DETECTED

## 2020-11-07 ENCOUNTER — APPOINTMENT (OUTPATIENT)
Dept: GENERAL RADIOLOGY | Age: 70
End: 2020-11-07
Payer: MEDICARE

## 2020-11-07 ENCOUNTER — HOSPITAL ENCOUNTER (EMERGENCY)
Age: 70
Discharge: HOME | End: 2020-11-08
Attending: FAMILY MEDICINE
Payer: MEDICARE

## 2020-11-07 PROCEDURE — 99283 EMERGENCY DEPT VISIT LOW MDM: CPT

## 2020-11-07 PROCEDURE — 96372 THER/PROPH/DIAG INJ SC/IM: CPT

## 2020-11-07 PROCEDURE — 71045 X-RAY EXAM CHEST 1 VIEW: CPT

## 2020-11-08 VITALS
RESPIRATION RATE: 16 BRPM | OXYGEN SATURATION: 94 % | SYSTOLIC BLOOD PRESSURE: 128 MMHG | TEMPERATURE: 99.4 F | DIASTOLIC BLOOD PRESSURE: 70 MMHG | HEART RATE: 82 BPM

## 2020-11-08 LAB
ALBUMIN SERPL-MCNC: 3.3 GM/DL (ref 3.4–5)
ALP BLD-CCNC: 54 U/L (ref 46–116)
ALT SERPL-CCNC: 74 U/L (ref 14–63)
ANION GAP: 11 MEQ/L (ref 8–16)
AST SERPL-CCNC: 46 U/L (ref 15–37)
BASOPHILS # BLD: 0.4 % (ref 0–3)
BILIRUB SERPL-MCNC: 0.6 MG/DL (ref 0.2–1)
BUN BLDV-MCNC: 26 MG/DL (ref 7–18)
CHLORIDE BLD-SCNC: 98 MEQ/L (ref 98–107)
CO2: 25 MEQ/L (ref 21–32)
CREAT SERPL-MCNC: 1.7 MG/DL (ref 0.6–1.3)
EOSINOPHILS RELATIVE PERCENT: 0.7 % (ref 0–4)
GFR, ESTIMATED: 43 ML/MIN/1.73M2
GLUCOSE BLD-MCNC: 96 MG/DL (ref 74–106)
HCT VFR BLD CALC: 42.3 % (ref 42–52)
HEMOGLOBIN: 14.3 GM/DL (ref 14–18)
LYMPHOCYTES # BLD: 12.1 % (ref 15–47)
MCH RBC QN AUTO: 30.6 PG (ref 27–31)
MCHC RBC AUTO-ENTMCNC: 33.7 GM/DL (ref 33–37)
MCV RBC AUTO: 90.7 FL (ref 80–94)
MONOCYTES: 10.3 % (ref 0–12)
PDW BLD-RTO: 13.5 % (ref 11.5–14.5)
PLATELET # BLD: 158 THOU/MM3 (ref 130–400)
PMV BLD AUTO: 9.6 FL (ref 7.4–10.4)
POC CALCIUM: 8.2 MG/DL (ref 8.5–10.1)
POTASSIUM SERPL-SCNC: 3.6 MEQ/L (ref 3.5–5.1)
RBC # BLD: 4.66 MILL/MM3 (ref 4.7–6.1)
SCAN OF BLOOD SMEAR: NORMAL
SEGS: 76.5 % (ref 43–75)
SODIUM BLD-SCNC: 134 MEQ/L (ref 136–145)
TOTAL PROTEIN: 7.3 GM/DL (ref 6.4–8.2)
WBC # BLD: 7.5 THOU/MM3 (ref 4.8–10.8)

## 2020-11-08 PROCEDURE — 6360000002 HC RX W HCPCS

## 2020-11-08 PROCEDURE — 36415 COLL VENOUS BLD VENIPUNCTURE: CPT

## 2020-11-08 PROCEDURE — 85025 COMPLETE CBC W/AUTO DIFF WBC: CPT

## 2020-11-08 PROCEDURE — 2709999900 HC NON-CHARGEABLE SUPPLY

## 2020-11-08 PROCEDURE — 80053 COMPREHEN METABOLIC PANEL: CPT

## 2020-11-08 PROCEDURE — 6370000000 HC RX 637 (ALT 250 FOR IP): Performed by: FAMILY MEDICINE

## 2020-11-08 PROCEDURE — 2580000003 HC RX 258: Performed by: FAMILY MEDICINE

## 2020-11-08 RX ORDER — ONDANSETRON 4 MG/1
4 TABLET, ORALLY DISINTEGRATING ORAL ONCE
Status: COMPLETED | OUTPATIENT
Start: 2020-11-08 | End: 2020-11-08

## 2020-11-08 RX ORDER — ONDANSETRON 4 MG/1
4 TABLET, ORALLY DISINTEGRATING ORAL 4 TIMES DAILY PRN
Qty: 20 TABLET | Refills: 0 | Status: SHIPPED | OUTPATIENT
Start: 2020-11-08

## 2020-11-08 RX ORDER — 0.9 % SODIUM CHLORIDE 0.9 %
500 INTRAVENOUS SOLUTION INTRAVENOUS ONCE
Status: COMPLETED | OUTPATIENT
Start: 2020-11-08 | End: 2020-11-08

## 2020-11-08 RX ORDER — METHYLPREDNISOLONE SODIUM SUCCINATE 125 MG/2ML
60 INJECTION, POWDER, LYOPHILIZED, FOR SOLUTION INTRAMUSCULAR; INTRAVENOUS ONCE
Status: COMPLETED | OUTPATIENT
Start: 2020-11-08 | End: 2020-11-08

## 2020-11-08 RX ORDER — METHYLPREDNISOLONE SODIUM SUCCINATE 125 MG/2ML
INJECTION, POWDER, LYOPHILIZED, FOR SOLUTION INTRAMUSCULAR; INTRAVENOUS
Status: COMPLETED
Start: 2020-11-08 | End: 2020-11-08

## 2020-11-08 RX ORDER — PREDNISONE 10 MG/1
TABLET ORAL
Qty: 20 TABLET | Refills: 0 | Status: SHIPPED | OUTPATIENT
Start: 2020-11-08 | End: 2020-11-18

## 2020-11-08 RX ADMIN — ONDANSETRON 4 MG: 4 TABLET, ORALLY DISINTEGRATING ORAL at 00:38

## 2020-11-08 RX ADMIN — METHYLPREDNISOLONE SODIUM SUCCINATE 60 MG: 125 INJECTION, POWDER, FOR SOLUTION INTRAMUSCULAR; INTRAVENOUS at 00:39

## 2020-11-08 RX ADMIN — METHYLPREDNISOLONE SODIUM SUCCINATE 60 MG: 125 INJECTION, POWDER, LYOPHILIZED, FOR SOLUTION INTRAMUSCULAR; INTRAVENOUS at 00:39

## 2020-11-08 RX ADMIN — SODIUM CHLORIDE 500 ML: 9 INJECTION, SOLUTION INTRAVENOUS at 01:11

## 2020-11-08 ASSESSMENT — ENCOUNTER SYMPTOMS
NAUSEA: 0
ABDOMINAL PAIN: 0
VOMITING: 0
WHEEZING: 0
BACK PAIN: 0
DIARRHEA: 0
COUGH: 1
SHORTNESS OF BREATH: 1
SORE THROAT: 0

## 2020-11-08 NOTE — ED NOTES
Pt resting on cot. States feeling better than he did when he came in. States he continues to feel fatigued but that it is manageable.       Marino Mcneal RN  11/08/20 [id number]

## 2020-11-08 NOTE — ED NOTES
Pt states he feels well enough to go home. Respirations easy and unlabored. Pt denies any complaints at this time. Pt alert and oriented. Pt given discharge instructions and prescription. Verbalized understanding and use of meds. Pt left ambulatory per self. Pt in stable condition.       Tyrel Cano RN  11/08/20 7105

## 2020-11-08 NOTE — ED PROVIDER NOTES
[address]/[facility] COMPLAINT       Chief Complaint   Patient presents with    Fatigue       Nurses Notes reviewed and I agree except as noted in the HPI. HISTORY OF PRESENT ILLNESS    Sukumar Diaz is a 79 y.o. male who tested positive for COVID-19 after developing symptoms on 10/30/2020 with test being taken on 10/31/2020. He presents for evaluation of increased fatigue. He states that he is experiencing mental fog. Cough remains but has improved. He has not had any vomiting but he does have some nausea and decreased appetite. He states he is trying to keep well-hydrated. He has not had any increased shortness of breath but it is still present. He believes his fevers have resolved as well. He continues to note some muscle aches. REVIEW OF SYSTEMS     Review of Systems   Constitutional: Positive for fatigue. Negative for activity change, appetite change, chills and fever (resolved). HENT: Negative for ear pain and sore throat. Respiratory: Positive for cough and shortness of breath (not worsened, but present). Negative for wheezing. Cardiovascular: Negative for chest pain and leg swelling. Gastrointestinal: Negative for abdominal pain, diarrhea, nausea and vomiting. Genitourinary: Negative for dysuria, flank pain and hematuria. Musculoskeletal: Positive for myalgias. Negative for arthralgias, back pain, gait problem and neck pain. Skin: Negative for rash and wound. Neurological: Negative for weakness, light-headedness and headaches. Psychiatric/Behavioral: Negative for agitation and hallucinations. The patient is not nervous/anxious. PAST MEDICAL HISTORY    has a past medical history of Anxiety, Arthritis, Cancer ([address] COPD exacerbation ([facility] [address] Elevated PSA, Gout, History of heart artery stent, and Hypertension.     SURGICAL HISTORY      has a past surgical history that includes other surgical history (2013); Tonsillectomy ('s); Appendectomy ('s); Colonoscopy (); and Prostatectomy (N/A, 10/6/2017). CURRENT MEDICATIONS       Discharge Medication List as of 2020  1:36 AM      CONTINUE these medications which have NOT CHANGED    Details   umeclidinium-vilanterol (ANORO ELLIPTA) 62.5-25 MCG/INH AEPB inhaler Inhale 1 puff into the lungs daily, Disp-30 puff,R-11Normal      aspirin 81 MG EC tablet Take 1 tablet by mouth nightly, Disp-30 tablet, R-1Normal      omeprazole (PRILOSEC) 20 MG delayed release capsule Take 1 capsule by mouth every morning (before breakfast), Disp-90 capsule, R-3Normal      allopurinol (ZYLOPRIM) 300 MG tablet Take 300 mg by mouth dailyHistorical Med      metoprolol succinate (TOPROL XL) 25 MG extended release tablet Take 25 mg by mouth dailyHistorical Med      Clopidogrel Bisulfate (PLAVIX PO) Take 75 mg by mouth daily Historical Med      atorvastatin (LIPITOR) 40 MG tablet Take 40 mg by mouth dailyHistorical Med      albuterol (PROVENTIL) (2.5 MG/3ML) 0.083% nebulizer solution Take 3 mLs by nebulization every 4 hours as needed for Wheezing, Disp-1 Package, R-0Print      Respiratory Therapy Supplies (NEBULIZER COMPRESSOR) KIT ONCE Starting Mon 2020, For 1 dose, Disp-1 kit, R-0, Print      lisinopril-hydrochlorothiazide (PRINZIDE;ZESTORETIC) 10-12.5 MG per tablet Take 1 tablet by mouth dailyHistorical Med      diphenhydrAMINE (BENADRYL) 25 MG tablet Take 25 mg by mouth every 8 hours as needed for Itching or AllergiesHistorical Med      Glucosamine 500 MG CAPS Take 1 capsule by mouth daily Historical Med      B Complex Vitamins (VITAMIN B COMPLEX PO) Take 1 tablet by mouth daily. ALLERGIES     is allergic to echinacea-baker seal [nutritional supplements]; cephalosporins; and tetracyclines & related. FAMILY HISTORY     He indicated that his mother is alive. He indicated that his father is . family history is not on file.     SOCIAL HISTORY      reports that he quit smoking about 10 months ago. His smoking use included cigarettes. He has a 44.00 pack-year smoking history. He has never used smokeless tobacco. He reports current alcohol use. He reports that he does not use drugs. PHYSICAL EXAM     INITIAL VITALS:  temperature is 99.4 °F (37.4 °C). His blood pressure is 128/70 and his pulse is 82. His respiration is 16 and oxygen saturation is 94%. Physical Exam  Vitals signs and nursing note reviewed. Constitutional:       General: He is not in acute distress. Appearance: He is not diaphoretic. Cardiovascular:      Rate and Rhythm: Normal rate and regular rhythm. Heart sounds: Normal heart sounds. Pulmonary:      Effort: Pulmonary effort is normal.      Breath sounds: Normal breath sounds. Abdominal:      General: Bowel sounds are normal. There is no distension. Palpations: Abdomen is soft. Tenderness: There is no abdominal tenderness. Lymphadenopathy:      Cervical: No cervical adenopathy. Skin:     General: Skin is warm and dry.          DIFFERENTIAL DIAGNOSIS:   COVID-19, viral pneumonia, PACO    DIAGNOSTIC RESULTS       RADIOLOGY: non-plain filmimages(s) such as CT, Ultrasound and MRI are read by the radiologist.  XR CHEST PORTABLE   Final Result   Impression:   Normal chest.      This document has been electronically signed by: Ania Johnson MD on    11/07/2020 11:54 PM               LABS:   Labs Reviewed   CBC WITH AUTO DIFFERENTIAL - Abnormal; Notable for the following components:       Result Value    RBC 4.66 (*)     SEGS 76.5 (*)     Lymphocytes 12.1 (*)     All other components within normal limits   COMPREHENSIVE METABOLIC PANEL - Abnormal; Notable for the following components:    CREATININE 1.7 (*)     BUN 26 (*)     Sodium 134 (*)     POC CALCIUM 8.2 (*)     AST 46 (*)     Alb 3.3 (*)     ALT 74 (*)     All other components within normal limits   GLOMERULAR FILTRATION RATE, ESTIMATED - Abnormal; Notable for the

## 2020-11-08 NOTE — ED TRIAGE NOTES
Pt states diagnosied with COVID last week. The last day he has had increased fatigue. States no pain, vomiting or increased shortness of breath. Pulse ox with ambulation is 91%. Pulse ox at rest is 94%.

## 2020-11-09 ENCOUNTER — CARE COORDINATION (OUTPATIENT)
Dept: CARE COORDINATION | Age: 70
End: 2020-11-09

## 2020-11-09 NOTE — CARE COORDINATION
Attempted to reach patient for ED follow up in regards to COVID-19 education/ monitoring. Patient was unavailable at the time of my call, and a generic voicemail message was left asking patient to return my call at [phone].

## 2020-11-10 NOTE — CARE COORDINATION
Attempted to reach patient for ED follow up in regards to COVID-19 education/ monitoring. Patient was unavailable at the time of my call, and a generic voicemail message was left asking patient to return my call at [phone].

## 2021-02-17 ENCOUNTER — HOSPITAL ENCOUNTER (OUTPATIENT)
Dept: CT IMAGING | Age: 71
Discharge: HOME | End: 2021-02-17
Payer: MEDICARE

## 2021-02-17 DIAGNOSIS — Z87.891 PERSONAL HISTORY OF TOBACCO USE: ICD-10-CM

## 2021-02-17 PROCEDURE — 71271 CT THORAX LUNG CANCER SCR C-: CPT

## 2021-03-03 ENCOUNTER — OFFICE VISIT (OUTPATIENT)
Dept: PULMONOLOGY | Age: 71
End: 2021-03-03
Payer: MEDICARE

## 2021-03-03 VITALS
DIASTOLIC BLOOD PRESSURE: 68 MMHG | OXYGEN SATURATION: 96 % | BODY MASS INDEX: 28.55 KG/M2 | HEIGHT: 72 IN | SYSTOLIC BLOOD PRESSURE: 118 MMHG | WEIGHT: 210.8 LBS | TEMPERATURE: 98.6 F | HEART RATE: 92 BPM

## 2021-03-03 DIAGNOSIS — J44.9 COPD, MODERATE (HCC): ICD-10-CM

## 2021-03-03 DIAGNOSIS — J44.1 COPD EXACERBATION (HCC): Primary | ICD-10-CM

## 2021-03-03 DIAGNOSIS — Z87.891 PERSONAL HISTORY OF TOBACCO USE: ICD-10-CM

## 2021-03-03 DIAGNOSIS — Z86.16 HISTORY OF 2019 NOVEL CORONAVIRUS DISEASE (COVID-19): ICD-10-CM

## 2021-03-03 LAB
ALBUMIN SERPL-MCNC: 4.1 G/DL
ALP BLD-CCNC: 56 U/L
ALT SERPL-CCNC: 37 U/L
ANION GAP SERPL CALCULATED.3IONS-SCNC: 13 MMOL/L
AST SERPL-CCNC: 21 U/L
AVERAGE GLUCOSE: 91
BASOPHILS ABSOLUTE: 0.1 /ΜL
BASOPHILS RELATIVE PERCENT: 1.2 %
BILIRUB SERPL-MCNC: 0.4 MG/DL (ref 0.1–1.4)
BUN BLDV-MCNC: 15 MG/DL
CALCIUM SERPL-MCNC: 8.9 MG/DL
CHLORIDE BLD-SCNC: 105 MMOL/L
CHOLESTEROL, TOTAL: 96 MG/DL
CHOLESTEROL/HDL RATIO: ABNORMAL
CO2: 26 MMOL/L
CREAT SERPL-MCNC: 1.3 MG/DL
EOSINOPHILS ABSOLUTE: 1.6 /ΜL
EOSINOPHILS RELATIVE PERCENT: 17 %
GFR CALCULATED: NORMAL
GLUCOSE BLD-MCNC: 112 MG/DL
HBA1C MFR BLD: 4.8 %
HCT VFR BLD CALC: 43.6 % (ref 41–53)
HDLC SERPL-MCNC: 31 MG/DL (ref 35–70)
HEMOGLOBIN: 14.2 G/DL (ref 13.5–17.5)
LDL CHOLESTEROL CALCULATED: 27 MG/DL (ref 0–160)
LYMPHOCYTES ABSOLUTE: 1.8 /ΜL
LYMPHOCYTES RELATIVE PERCENT: 18.3 %
MCH RBC QN AUTO: 29.7 PG
MCHC RBC AUTO-ENTMCNC: 32.5 G/DL
MCV RBC AUTO: 91.2 FL
MONOCYTES ABSOLUTE: 0.9 /ΜL
MONOCYTES RELATIVE PERCENT: 9.1 %
NEUTROPHILS ABSOLUTE: 5.3 /ΜL
NEUTROPHILS RELATIVE PERCENT: 54.4 %
NONHDLC SERPL-MCNC: ABNORMAL MG/DL
PDW BLD-RTO: 16.1 %
PLATELET # BLD: 265 K/ΜL
PMV BLD AUTO: 10.1 FL
POTASSIUM SERPL-SCNC: 3.9 MMOL/L
PROSTATE SPECIFIC ANTIGEN: 0.04 NG/ML
RBC # BLD: 4.79 10^6/ΜL
SODIUM BLD-SCNC: 140 MMOL/L
TOTAL PROTEIN: 6.7
TRIGL SERPL-MCNC: 193 MG/DL
VLDLC SERPL CALC-MCNC: 39 MG/DL
WBC # BLD: 9.6 10^3/ML

## 2021-03-03 PROCEDURE — G8926 SPIRO NO PERF OR DOC: HCPCS | Performed by: NURSE PRACTITIONER

## 2021-03-03 PROCEDURE — G8482 FLU IMMUNIZE ORDER/ADMIN: HCPCS | Performed by: NURSE PRACTITIONER

## 2021-03-03 PROCEDURE — G8427 DOCREV CUR MEDS BY ELIG CLIN: HCPCS | Performed by: NURSE PRACTITIONER

## 2021-03-03 PROCEDURE — 3017F COLORECTAL CA SCREEN DOC REV: CPT | Performed by: NURSE PRACTITIONER

## 2021-03-03 PROCEDURE — 1123F ACP DISCUSS/DSCN MKR DOCD: CPT | Performed by: NURSE PRACTITIONER

## 2021-03-03 PROCEDURE — 99214 OFFICE O/P EST MOD 30 MIN: CPT | Performed by: NURSE PRACTITIONER

## 2021-03-03 PROCEDURE — 3023F SPIROM DOC REV: CPT | Performed by: NURSE PRACTITIONER

## 2021-03-03 PROCEDURE — 4040F PNEUMOC VAC/ADMIN/RCVD: CPT | Performed by: NURSE PRACTITIONER

## 2021-03-03 PROCEDURE — G8417 CALC BMI ABV UP PARAM F/U: HCPCS | Performed by: NURSE PRACTITIONER

## 2021-03-03 PROCEDURE — 1036F TOBACCO NON-USER: CPT | Performed by: NURSE PRACTITIONER

## 2021-03-03 PROCEDURE — 94618 PULMONARY STRESS TESTING: CPT | Performed by: NURSE PRACTITIONER

## 2021-03-03 RX ORDER — PREDNISONE 20 MG/1
40 TABLET ORAL DAILY
Qty: 10 TABLET | Refills: 0 | Status: SHIPPED | OUTPATIENT
Start: 2021-03-03 | End: 2021-03-08

## 2021-03-03 RX ORDER — AZITHROMYCIN 500 MG/1
500 TABLET, FILM COATED ORAL DAILY
Qty: 3 TABLET | Refills: 0 | Status: SHIPPED | OUTPATIENT
Start: 2021-03-03 | End: 2021-03-06

## 2021-03-03 ASSESSMENT — ENCOUNTER SYMPTOMS
NAUSEA: 0
SHORTNESS OF BREATH: 1
SPUTUM PRODUCTION: 1
WHEEZING: 1
DIARRHEA: 0
CHEST TIGHTNESS: 0
VOMITING: 0
HEMOPTYSIS: 0
STRIDOR: 0
COUGH: 1

## 2021-03-03 ASSESSMENT — COPD QUESTIONNAIRES: COPD: 1

## 2021-03-03 NOTE — PROGRESS NOTES
[facility]    Patient: Monica Dean, 79 y.o.   : 1950  3/3/2021    Pt of Dr. Jhony Schaefer   Patient presents with    Follow-up     COPD 4 month pulmonary follow up CT 2021        COPD  He complains of cough, shortness of breath, sputum production and wheezing. There is no hemoptysis. This is a chronic problem. The current episode started more than 1 year ago. The problem occurs daily. The problem has been gradually worsening. The cough is productive of sputum (clear). Associated symptoms include dyspnea on exertion and nasal congestion. Pertinent negatives include no chest pain, fever or postnasal drip. His symptoms are aggravated by change in weather and strenuous activity. His symptoms are alleviated by beta-agonist and rest. He reports significant improvement on treatment. Risk factors for lung disease include smoking/tobacco exposure. His past medical history is significant for COPD. [facility] is here for follow up for COPD. PMH significant for anxiety, CAD s/p stent , COPD, and prostate cancer in . Overall patient reports respiratory symptoms have been worse since Covid-19. Patient reports good compliance with inhaled medications (Anoro). Patient using albuterol 3 times per day on average. Patient reports moderate physical limitation due to respiratory symptoms. Diagnosed with Covid-19, 11/3/2020 from [facility] was never hospitalized treated with prednisone from . Reports he has not had respiratory symptoms return to baseline yet reports some mental Fog feels things have been worse in the last 2-3 weeks with increased wheezing and cough productive of thick white phlegm.      Previously walking 0.5 mi per day, SOB doing dishes now    MMRC Dyspnea Scale:   0: Dyspneic on strenuous exercise  1: Dyspneic on walking a slight hill  2: Dyspneic on walking level ground; must stop occasionally due to breathlessness 3: Must stop for breathlessness after walking 100 yards or after a few minutes  4: Cannot leave house; breathless on dressing/undressing    MMRC dyspnea score: 3    Wells Score:    Sign/Symptom:                  Score:    Symptoms of  DVT :    0.       (3)                                 Tachycardia:               0. (1.5)  Immobilization:           0. (1.5)  History of VTE:           0. (1)  Malignancy:                0. (1)  Hemoptysis:               0. (1)  No alternative diagnosis: 1  (3)    Total score:         1      High Probability > 6. Low  Probability < 2. Likely > 4. Unlikely < 4. Progress History:   Since last visit any new medical issues? Yes Covid-19 in 2020  New ER or hospital visits? Yes US in [place] diagnosed with covid-19  Any new or changes in medicines? No  Using inhalers? Yes Anoro  Are they helpful? Yes   Flu vaccine? UTD  Pneumonia vaccine?  UTD  Past Medical hx   PMH:  Past Medical History:   Diagnosis Date    Anxiety     Arthritis     Cancer ([facility] [address]     prostate cancer     COPD exacerbation ([facility] [address] 2020    Elevated PSA     Gout     History of heart artery stent 2019    LAD with Dr. Valeria Rangel at [facility] Hypertension      SURGICAL HISTORY:  Past Surgical History:   Procedure Laterality Date    APPENDECTOMY  's    COLONOSCOPY      OTHER SURGICAL HISTORY  2013    mole removal    PROSTATECTOMY N/A 10/6/2017    ROBOTIC RADICAL PROSTATECTOMY, BILATERAL PELVIC LYMPH NODE DISSECTION performed by Ananth Simmons MD at [place] [id number]     SOCIAL HISTORY:  Social History     Tobacco Use    Smoking status: Former Smoker     Packs/day: 1.00     Years: 44.00     Pack years: 44.00     Types: Cigarettes     Quit date: 2020     Years since quittin.1    Smokeless tobacco: Never Used   Substance Use Topics    Alcohol use: Yes     Comment: 1-2 BEERS DAILY    Drug use: No     ALLERGIES:  Allergies   Allergen Reactions  Echinacea-Sigala Seal [Nutritional Supplements]     Cephalosporins Nausea And Vomiting    Tetracyclines & Related Rash     FAMILY HISTORY:No family history on file.   CURRENT MEDICATIONS:  Current Outpatient Medications   Medication Sig Dispense Refill    ZINC PO Take by mouth      NONFORMULARY Indications: joint lax       guaiFENesin (MUCINEX) 600 MG extended release tablet Take 1 tablet by mouth 2 times daily 60 tablet 6    predniSONE (DELTASONE) 20 MG tablet Take 2 tablets by mouth daily for 5 days 10 tablet 0    azithromycin (ZITHROMAX) 500 MG tablet Take 1 tablet by mouth daily for 3 days 3 tablet 0    ondansetron (ZOFRAN ODT) 4 MG disintegrating tablet Take 1 tablet by mouth 4 times daily as needed for Nausea or Vomiting 20 tablet 0    umeclidinium-vilanterol (ANORO ELLIPTA) 62.5-25 MCG/INH AEPB inhaler Inhale 1 puff into the lungs daily 30 puff 11    aspirin 81 MG EC tablet Take 1 tablet by mouth nightly 30 tablet 1    omeprazole (PRILOSEC) 20 MG delayed release capsule Take 1 capsule by mouth every morning (before breakfast) 90 capsule 3    allopurinol (ZYLOPRIM) 300 MG tablet Take 300 mg by mouth daily      metoprolol succinate (TOPROL XL) 25 MG extended release tablet Take 25 mg by mouth daily      Clopidogrel Bisulfate (PLAVIX PO) Take 75 mg by mouth daily       atorvastatin (LIPITOR) 40 MG tablet Take 40 mg by mouth daily      albuterol (PROVENTIL) (2.5 MG/3ML) 0.083% nebulizer solution Take 3 mLs by nebulization every 4 hours as needed for Wheezing 1 Package 0    Respiratory Therapy Supplies (NEBULIZER COMPRESSOR) KIT 1 kit by Does not apply route once for 1 dose 1 kit 0    lisinopril-hydrochlorothiazide (PRINZIDE;ZESTORETIC) 10-12.5 MG per tablet Take 1 tablet by mouth daily      diphenhydrAMINE (BENADRYL) 25 MG tablet Take 25 mg by mouth every 8 hours as needed for Itching or Allergies      Glucosamine 500 MG CAPS Take 1 capsule by mouth daily  B Complex Vitamins (VITAMIN B COMPLEX PO) Take 1 tablet by mouth daily. No current facility-administered medications for this visit. [facility]   Review of Systems   Constitutional: Negative for chills, fever and unexpected weight change. HENT: Negative for postnasal drip. Respiratory: Positive for cough, sputum production, shortness of breath and wheezing. Negative for hemoptysis, chest tightness and stridor. Cardiovascular: Positive for dyspnea on exertion. Negative for chest pain and leg swelling. Gastrointestinal: Negative for diarrhea, nausea and vomiting. Genitourinary: Negative for dysuria. Physical exam   /68 (Site: Left Upper Arm, Position: Sitting)   Pulse 92   Temp 98.6 °F (37 °C)   Ht 6' (1.829 m)   Wt 210 lb 12.8 oz (95.6 kg)   SpO2 96% Comment: on RA  BMI 28.59 kg/m²    Wt Readings from Last 3 Encounters:   03/03/21 210 lb 12.8 oz (95.6 kg)   10/31/20 220 lb (99.8 kg)   10/20/20 219 lb (99.3 kg)       Physical Exam  Vitals signs and nursing note reviewed. Constitutional:       General: He is not in acute distress. Appearance: He is well-developed. HENT:      Head: Normocephalic and atraumatic. Neck:      Musculoskeletal: Neck supple. Trachea: No tracheal deviation. Cardiovascular:      Rate and Rhythm: Normal rate and regular rhythm. Heart sounds: Normal heart sounds. No murmur. Pulmonary:      Effort: Pulmonary effort is normal. No respiratory distress. Breath sounds: No stridor. Wheezing present. No rales. Comments: Bilateral expiratory wheezes  Chest:      Chest wall: No tenderness. Abdominal:      General: Bowel sounds are normal. There is no distension. Palpations: Abdomen is soft. Skin:     General: Skin is warm and dry. Capillary Refill: Capillary refill takes less than 2 seconds. Neurological:      Mental Status: He is alert and oriented to person, place, and time.    Psychiatric: Six minute walk test done in my office today by my medical assistant. Markel's oxygen saturation at rest on room air was 94%. His oxygen saturation dropped to 94% on room air with exertion after walking 864 feet in 6 minutes. The patient did not require supplemental O2, no order was generated. Assessment      Diagnosis Orders   1. COPD exacerbation (HCC)  guaiFENesin (MUCINEX) 600 MG extended release tablet    predniSONE (DELTASONE) 20 MG tablet    azithromycin (ZITHROMAX) 500 MG tablet    6 Minute Walk Test   2. COPD, moderate ([address]     3. History of 2019 novel coronavirus disease (COVID-19)     4. Personal history of tobacco use         Low wells score  Plan   -Start guaifenesin 600 mg PO BID  -Treat for COPD exacerbation LDCT shows no acute intrathoracic process  -Prednisone 40 mg PO Daily x5  -Azithromycin 500 mg PO Daily x 3 days   -6 MWT today for DON  -Continue Anoro 1 puff Daily  -Albuterol 2 puff Q6 hrs PRN for SOB/wheezing   -Discussed albuterol inhaler and nebulizer use. Reviewed signs and symptoms indicating need for use including Shortness of breath and wheezing. Discussed with patient the importance of using inhaler or nebulizer within the prescribed time frames. Patient verbalized understanding to use one or the other not both within prescribed time frame.  Patient also verbalized understanding that if they experience no relief after using albuterol and resting for 15 minutes they need to go to nearest ER or call 911.  -Reviewed LDCT Rads 1 repeat in 1 year no acute process noted s/p covid  -Advised to maintain pneumonia vaccine with PCP and to take flu vaccine this coming season.  -Advised patient to call office with any changes, questions, or concerns regarding respiratory status    Will see Dionne Gunderson back in: 3 months    Malian Pair CNP  3/3/2021

## 2021-03-11 ENCOUNTER — TELEPHONE (OUTPATIENT)
Dept: PULMONOLOGY | Age: 71
End: 2021-03-11

## 2021-03-11 NOTE — TELEPHONE ENCOUNTER
Called and spoke with Mr Hugo Subramanian he states that he is doing much better. He had no further questions or issues at time of our call.  Will continue on current regimen and follow-up schedule

## 2021-03-11 NOTE — TELEPHONE ENCOUNTER
Patient called in stating that you wanted him to call you back this week to let you know how the medications are working for him. He states he is doing so much better and the wheezing is about 90% gone. He said thank you and if you have any question you can call him.

## 2021-04-19 ENCOUNTER — TELEPHONE (OUTPATIENT)
Dept: PULMONOLOGY | Age: 71
End: 2021-04-19

## 2021-04-19 DIAGNOSIS — Z87.891 PERSONAL HISTORY OF TOBACCO USE: ICD-10-CM

## 2021-04-19 DIAGNOSIS — J44.9 COPD, MODERATE (HCC): ICD-10-CM

## 2021-04-19 NOTE — TELEPHONE ENCOUNTER
Received fax from patients pharmacy requesting the pt would benefit from a 90 day supply of the Anoro. See Media. Please advise.

## 2021-04-19 NOTE — TELEPHONE ENCOUNTER
Can reach out to patient and offer but this usually results in paying 3 copays at once which is sometimes not feasible if wishes to switch to 90 day let me know and I will change order

## 2021-04-26 RX ORDER — UMECLIDINIUM BROMIDE AND VILANTEROL TRIFENATATE 62.5; 25 UG/1; UG/1
1 POWDER RESPIRATORY (INHALATION) DAILY
Qty: 90 PUFF | Refills: 3 | Status: SHIPPED | OUTPATIENT
Start: 2021-04-26 | End: 2021-04-27 | Stop reason: SDUPTHER

## 2021-04-27 DIAGNOSIS — J44.9 COPD, MODERATE (HCC): Primary | ICD-10-CM

## 2021-04-27 NOTE — TELEPHONE ENCOUNTER
Patient was last seen : 3.3.21  Scheduled f/u: 6.3.21  [facility] send to [facility].

## 2021-04-27 NOTE — TELEPHONE ENCOUNTER
If patient wishes to transfer Rx to different pharmacy they can call the pharmacy and have them transfer it does not need new order will send to new pharmacy this time educate patient for karyn

## 2021-04-27 NOTE — TELEPHONE ENCOUNTER
Patient calling in regarding his anoro inhaler prescription that was just refilled. When he spoke to someone previously he forgot to check the pharmacy. He said he does not use [facility] any longer. He needs the prescription sent to [facility] in [place] for 90 supplies. Please verify and resend to [facility].

## 2021-05-06 DIAGNOSIS — K21.9 GASTROESOPHAGEAL REFLUX DISEASE: ICD-10-CM

## 2021-05-12 RX ORDER — OMEPRAZOLE 20 MG/1
CAPSULE, DELAYED RELEASE ORAL
Qty: 90 CAPSULE | Refills: 0 | Status: SHIPPED | OUTPATIENT
Start: 2021-05-12 | End: 2021-08-06

## 2021-08-19 LAB
ALBUMIN SERPL-MCNC: 4.2 G/DL
ALP BLD-CCNC: 61 U/L
ALT SERPL-CCNC: 26 U/L
ANION GAP SERPL CALCULATED.3IONS-SCNC: 9 MMOL/L
AST SERPL-CCNC: 16 U/L
AVERAGE GLUCOSE: 91
BILIRUB SERPL-MCNC: 0.6 MG/DL (ref 0.1–1.4)
BUN BLDV-MCNC: 14 MG/DL
CALCIUM SERPL-MCNC: 8.9 MG/DL
CHLORIDE BLD-SCNC: 107 MMOL/L
CO2: 30 MMOL/L
CREAT SERPL-MCNC: 1.4 MG/DL
GFR CALCULATED: NORMAL
GLUCOSE BLD-MCNC: 110 MG/DL
HBA1C MFR BLD: 4.8 %
POTASSIUM SERPL-SCNC: 4.4 MMOL/L
PROSTATE SPECIFIC ANTIGEN: 0.04 NG/ML
SODIUM BLD-SCNC: 142 MMOL/L
TOTAL PROTEIN: 6.5

## 2021-08-26 ENCOUNTER — OFFICE VISIT (OUTPATIENT)
Dept: PULMONOLOGY | Age: 71
End: 2021-08-26
Payer: MEDICARE

## 2021-08-26 ENCOUNTER — TELEPHONE (OUTPATIENT)
Dept: PULMONOLOGY | Age: 71
End: 2021-08-26

## 2021-08-26 VITALS
SYSTOLIC BLOOD PRESSURE: 118 MMHG | DIASTOLIC BLOOD PRESSURE: 78 MMHG | WEIGHT: 214 LBS | HEART RATE: 75 BPM | HEIGHT: 72 IN | OXYGEN SATURATION: 97 % | TEMPERATURE: 98.4 F | BODY MASS INDEX: 28.99 KG/M2

## 2021-08-26 DIAGNOSIS — Z86.16 HISTORY OF 2019 NOVEL CORONAVIRUS DISEASE (COVID-19): ICD-10-CM

## 2021-08-26 DIAGNOSIS — J44.9 COPD, MODERATE (HCC): Primary | ICD-10-CM

## 2021-08-26 DIAGNOSIS — Z87.891 PERSONAL HISTORY OF TOBACCO USE: ICD-10-CM

## 2021-08-26 PROCEDURE — 1036F TOBACCO NON-USER: CPT | Performed by: NURSE PRACTITIONER

## 2021-08-26 PROCEDURE — 99214 OFFICE O/P EST MOD 30 MIN: CPT | Performed by: NURSE PRACTITIONER

## 2021-08-26 PROCEDURE — 4040F PNEUMOC VAC/ADMIN/RCVD: CPT | Performed by: NURSE PRACTITIONER

## 2021-08-26 PROCEDURE — G8417 CALC BMI ABV UP PARAM F/U: HCPCS | Performed by: NURSE PRACTITIONER

## 2021-08-26 PROCEDURE — 1123F ACP DISCUSS/DSCN MKR DOCD: CPT | Performed by: NURSE PRACTITIONER

## 2021-08-26 PROCEDURE — 3023F SPIROM DOC REV: CPT | Performed by: NURSE PRACTITIONER

## 2021-08-26 PROCEDURE — 3017F COLORECTAL CA SCREEN DOC REV: CPT | Performed by: NURSE PRACTITIONER

## 2021-08-26 PROCEDURE — G0296 VISIT TO DETERM LDCT ELIG: HCPCS | Performed by: NURSE PRACTITIONER

## 2021-08-26 PROCEDURE — G8427 DOCREV CUR MEDS BY ELIG CLIN: HCPCS | Performed by: NURSE PRACTITIONER

## 2021-08-26 PROCEDURE — G8926 SPIRO NO PERF OR DOC: HCPCS | Performed by: NURSE PRACTITIONER

## 2021-08-26 RX ORDER — ALBUTEROL SULFATE 90 UG/1
2 AEROSOL, METERED RESPIRATORY (INHALATION) EVERY 6 HOURS PRN
Qty: 1 INHALER | Refills: 3 | Status: SHIPPED | OUTPATIENT
Start: 2021-08-26 | End: 2022-09-20 | Stop reason: SDUPTHER

## 2021-08-26 ASSESSMENT — ENCOUNTER SYMPTOMS
WHEEZING: 0
STRIDOR: 0
CHEST TIGHTNESS: 0
VOMITING: 0
SPUTUM PRODUCTION: 1
SHORTNESS OF BREATH: 1
NAUSEA: 0
COUGH: 1
DIARRHEA: 0

## 2021-08-26 ASSESSMENT — COPD QUESTIONNAIRES: COPD: 1

## 2021-08-26 NOTE — PROGRESS NOTES
[facility]    Patient: Gerardo Victor, 70 y.o.   : 1950    Pt of Dr. Shahzad Dupree   Patient presents with    Follow-up     3 mo COPD pulm f/u with no test        COPD  He complains of cough, shortness of breath and sputum production. There is no wheezing. This is a chronic problem. The problem occurs daily. Progression since onset: improved. The cough is productive of sputum (clear phlegm). Associated symptoms include dyspnea on exertion and postnasal drip. Pertinent negatives include no chest pain or fever. His symptoms are aggravated by change in weather and strenuous activity. His symptoms are alleviated by beta-agonist and rest. He reports significant improvement on treatment. Risk factors for lung disease include smoking/tobacco exposure. His past medical history is significant for COPD. Rosibel Salas is here for follow up for COPD. PMH significant for anxiety, CAD s/p stent , COPD, Covid-19 11/3/2020 did not require hospitalization and prostate cancer in . Overall patient reports respiratory symptoms have been improved since treated for COPD exacerbation. Patient reports good compliance with inhaled medications (Anoro). Patient using albuterol 1 times every other week on average. Patient reports no physical limitation due to respiratory symptoms. 6 MWT 3/3/2021 did not require supplemental O2    Progress History:   Since last visit any new medical issues? No  New ER or hospital visits? No  Any new or changes in medicines? Yes saw Derm for psoriasis started Anneliese Shed injection 2 Monday reports improvement and no side effects today  Using inhalers? Yes  Are they helpful? Yes   Pneumonia vaccine?  Last dose   Past Medical hx   PMH:  Past Medical History:   Diagnosis Date    Anxiety     Arthritis     Cancer ([facility] [address] 2017    prostate cancer 8-    COPD exacerbation ([facility] [address] 2020    Elevated PSA     Gout     History of heart artery stent 2019    LAD with Dr. Marvel Dancer at [facility], [place] Hypertension      SURGICAL HISTORY:  Past Surgical History:   Procedure Laterality Date    APPENDECTOMY  1970's    COLONOSCOPY      OTHER SURGICAL HISTORY  2013    mole removal    PROSTATECTOMY N/A 10/6/2017    ROBOTIC RADICAL PROSTATECTOMY, BILATERAL PELVIC LYMPH NODE DISSECTION performed by Mayank Vasquez MD at [place] [address]     SOCIAL HISTORY:  Social History     Tobacco Use    Smoking status: Former Smoker     Packs/day: 1.00     Years: 44.00     Pack years: 44.00     Types: Cigarettes     Quit date: 2020     Years since quittin.6    Smokeless tobacco: Never Used   Substance Use Topics    Alcohol use: Yes     Comment: 1-2 BEERS DAILY    Drug use: No     ALLERGIES:  Allergies   Allergen Reactions    Echinacea-Sigala Seal [Nutritional Supplements]     Cephalosporins Nausea And Vomiting    Tetracyclines & Related Rash     FAMILY HISTORY:No family history on file.   CURRENT MEDICATIONS:  Current Outpatient Medications   Medication Sig Dispense Refill    albuterol sulfate HFA (PROVENTIL HFA) 108 (90 Base) MCG/ACT inhaler Inhale 2 puffs into the lungs every 6 hours as needed for Wheezing 1 Inhaler 3    omeprazole (PRILOSEC) 20 MG delayed release capsule Take 1 capsule by mouth Daily 90 capsule 3    umeclidinium-vilanterol (ANORO ELLIPTA) 62.5-25 MCG/INH AEPB inhaler Inhale 1 puff into the lungs daily 30 puff 11    ZINC PO Take by mouth      NONFORMULARY Indications: joint lax       ondansetron (ZOFRAN ODT) 4 MG disintegrating tablet Take 1 tablet by mouth 4 times daily as needed for Nausea or Vomiting 20 tablet 0    aspirin 81 MG EC tablet Take 1 tablet by mouth nightly 30 tablet 1    allopurinol (ZYLOPRIM) 300 MG tablet Take 300 mg by mouth daily      metoprolol succinate (TOPROL XL) 25 MG extended release tablet Take 25 mg by mouth daily      Clopidogrel Bisulfate (PLAVIX PO) Take 75 mg by mouth daily       atorvastatin (LIPITOR) 40 MG tablet Take 40 mg by mouth daily      albuterol (PROVENTIL) (2.5 MG/3ML) 0.083% nebulizer solution Take 3 mLs by nebulization every 4 hours as needed for Wheezing 1 Package 0    lisinopril-hydrochlorothiazide (PRINZIDE;ZESTORETIC) 10-12.5 MG per tablet Take 1 tablet by mouth daily      diphenhydrAMINE (BENADRYL) 25 MG tablet Take 25 mg by mouth every 8 hours as needed for Itching or Allergies      Glucosamine 500 MG CAPS Take 1 capsule by mouth daily       B Complex Vitamins (VITAMIN B COMPLEX PO) Take 1 tablet by mouth daily.  Respiratory Therapy Supplies (NEBULIZER COMPRESSOR) KIT 1 kit by Does not apply route once for 1 dose 1 kit 0     No current facility-administered medications for this visit. Madelaine HER   Review of Systems   Constitutional: Negative for chills, fever and unexpected weight change. HENT: Positive for postnasal drip. Respiratory: Positive for cough, sputum production and shortness of breath. Negative for chest tightness, wheezing and stridor. Cardiovascular: Positive for dyspnea on exertion. Negative for chest pain and leg swelling. Gastrointestinal: Negative for diarrhea, nausea and vomiting. Genitourinary: Negative for dysuria. Physical exam   /78 (Site: Left Upper Arm, Position: Sitting, Cuff Size: Small Adult)   Pulse 75   Temp 98.4 °F (36.9 °C)   Ht 6' (1.829 m)   Wt 214 lb (97.1 kg)   SpO2 97% Comment: on r/a  BMI 29.02 kg/m²    Wt Readings from Last 3 Encounters:   08/26/21 214 lb (97.1 kg)   03/03/21 210 lb 12.8 oz (95.6 kg)   10/31/20 220 lb (99.8 kg)       Physical Exam  Vitals and nursing note reviewed. Constitutional:       General: He is not in acute distress. Appearance: He is well-developed. HENT:      Head: Normocephalic and atraumatic. Neck:      Trachea: No tracheal deviation. Cardiovascular:      Rate and Rhythm: Normal rate and regular rhythm.       Heart sounds: Normal heart sounds. No murmur heard. Pulmonary:      Effort: Pulmonary effort is normal. No respiratory distress. Breath sounds: Normal breath sounds. No stridor. No wheezing or rales. Chest:      Chest wall: No tenderness. Abdominal:      General: Bowel sounds are normal. There is no distension. Palpations: Abdomen is soft. Musculoskeletal:      Cervical back: Neck supple. Skin:     General: Skin is warm and dry. Capillary Refill: Capillary refill takes less than 2 seconds. Neurological:      Mental Status: He is alert and oriented to person, place, and time. Psychiatric:         Behavior: Behavior normal.         Thought Content: Thought content normal.          Results   Lung Nodule Screening     [x] Qualifies    [] Does not qualify   [] Declined    [] Completed  LDCT 2/17/2021 44 pack yrs Quit 2020   The USPSTF recommends annual screening for lung cancer with low-dose computed tomography (LDCT) in adults aged 48 to [de-identified] years who have a 20 pack-year smoking history and currently smoke or have quit within the past 15 years. Screening should be discontinued once a person has not smoked for 15 years or develops a health problem that substantially limits life expectancy or the ability or willingness to have curative lung surgery. Assessment      Diagnosis Orders   1. COPD, moderate (HCC)  albuterol sulfate HFA (PROVENTIL HFA) 108 (90 Base) MCG/ACT inhaler   2. Personal history of tobacco use  [place] VISIT TO DISCUSS LUNG CA SCREEN W LDCT    CT Lung Screen (Annual)   3. History of 2019 novel coronavirus disease (COVID-19)           Plan   -Continue Anoro  -Albuterol 2 puff Q6 hrs PRN for SOB/wheezing   -Discussed albuterol inhaler use. Reviewed signs and symptoms indicating need for use including Shortness of breath and wheezing. Discussed with patient the importance of using inhaler within the prescribed time frames.  Patient verbalized understanding to use within prescribed time frame. Patient also verbalized understanding that if they experience no relief after using albuterol and resting for 15 minutes they need to go to nearest ER or call 911.  -Discussed LDCT next scan February 2022  -Advised to maintain pneumonia vaccine with PCP and to take flu vaccine this coming season.  -Advised patient to call office with any changes, questions, or concerns regarding respiratory status    Will see Tamela Jim back in: 6 months with LDCT    Camillecarol Kenney FESTUS  8/26/2021       Low Dose CT (LDCT) Lung Screening criteria met   Age 55-77   Pack year smoking >30   Still smoking or less than 15 year since quit   No sign or symptoms of lung cancer   > 11 months since last LDCT     Risks and benefits of lung cancer screening with LDCT scans discussed:    Significance of positive screen - False-positive LDCT results often occur. 95% of all positive results do not lead to a diagnosis of cancer. Usually further imaging can resolve most false-positive results; however, some patients may require invasive procedures. Over diagnosis risk - 10% to 12% of screen-detected lung cancer cases are over diagnosed--that is, the cancer would not have been detected in the patient's lifetime without the screening. Need for follow up screens annually to continue lung cancer screening effectiveness     Risks associated with radiation from annual LDCT- Radiation exposure is about the same as for a mammogram, which is about 1/3 of the annual background radiation exposure from everyday life. Starting screening at age 54 is not likely to increase cancer risk from radiation exposure. Patients with comorbidities resulting in life expectancy of < 10 years, or that would preclude treatment of an abnormality identified on CT, should not be screened due to lack of benefit.     To obtain maximal benefit from this screening, smoking cessation and long-term abstinence from smoking is critical

## 2021-08-26 NOTE — TELEPHONE ENCOUNTER
[address] called and the Albuterol covered is not proventil so they are giving him Proair or Ventolin. I ok's since dictation stated Albuterol.

## 2022-02-15 LAB
ALBUMIN SERPL-MCNC: 4.3 G/DL
ALP BLD-CCNC: 59 U/L
ALT SERPL-CCNC: 36 U/L
ANION GAP SERPL CALCULATED.3IONS-SCNC: 12 MMOL/L
AST SERPL-CCNC: 18 U/L
AVERAGE GLUCOSE: 94
BASOPHILS ABSOLUTE: 0.1 /ΜL
BASOPHILS RELATIVE PERCENT: 0.8 %
BILIRUB SERPL-MCNC: 0.5 MG/DL (ref 0.1–1.4)
BUN BLDV-MCNC: 16 MG/DL
CALCIUM SERPL-MCNC: 8.9 MG/DL
CHLORIDE BLD-SCNC: 107 MMOL/L
CHOLESTEROL, TOTAL: 99 MG/DL
CHOLESTEROL/HDL RATIO: ABNORMAL
CO2: 29 MMOL/L
CREAT SERPL-MCNC: 1.5 MG/DL
CREATININE, URINE: 270.1
EOSINOPHILS ABSOLUTE: 0.5 /ΜL
EOSINOPHILS RELATIVE PERCENT: 5.4 %
GFR CALCULATED: NORMAL
GLUCOSE BLD-MCNC: 118 MG/DL
HBA1C MFR BLD: 4.9 %
HCT VFR BLD CALC: 42 % (ref 41–53)
HDLC SERPL-MCNC: 32 MG/DL (ref 35–70)
HEMOGLOBIN: 13.8 G/DL (ref 13.5–17.5)
LDL CHOLESTEROL CALCULATED: 31 MG/DL (ref 0–160)
LYMPHOCYTES ABSOLUTE: 1.7 /ΜL
LYMPHOCYTES RELATIVE PERCENT: 17.8 %
MCH RBC QN AUTO: 29.9 PG
MCHC RBC AUTO-ENTMCNC: 32.9 G/DL
MCV RBC AUTO: 90.9 FL
MICROALBUMIN/CREAT 24H UR: 8.7 MG/G{CREAT}
MICROALBUMIN/CREAT UR-RTO: 3.2
MONOCYTES ABSOLUTE: 0.8 /ΜL
MONOCYTES RELATIVE PERCENT: 8.9 %
NEUTROPHILS ABSOLUTE: 6.3 /ΜL
NEUTROPHILS RELATIVE PERCENT: 67.1 %
NONHDLC SERPL-MCNC: ABNORMAL MG/DL
PDW BLD-RTO: 15.8 %
PLATELET # BLD: 261 K/ΜL
PMV BLD AUTO: 10.2 FL
POTASSIUM SERPL-SCNC: 4.6 MMOL/L
PROSTATE SPECIFIC ANTIGEN: 0.07 NG/ML
RBC # BLD: 4.63 10^6/ΜL
SODIUM BLD-SCNC: 143 MMOL/L
TOTAL PROTEIN: 6.6
TRIGL SERPL-MCNC: 179 MG/DL
VLDLC SERPL CALC-MCNC: 36 MG/DL
WBC # BLD: 9.3 10^3/ML

## 2022-02-28 ENCOUNTER — HOSPITAL ENCOUNTER (OUTPATIENT)
Dept: CT IMAGING | Age: 72
Discharge: HOME | End: 2022-02-28
Payer: MEDICARE

## 2022-02-28 DIAGNOSIS — Z87.891 PERSONAL HISTORY OF TOBACCO USE: ICD-10-CM

## 2022-02-28 PROCEDURE — 71271 CT THORAX LUNG CANCER SCR C-: CPT

## 2022-03-06 ENCOUNTER — HOSPITAL ENCOUNTER (EMERGENCY)
Age: 72
Discharge: HOME | End: 2022-03-06
Attending: FAMILY MEDICINE
Payer: MEDICARE

## 2022-03-06 VITALS
HEIGHT: 72 IN | TEMPERATURE: 96.9 F | RESPIRATION RATE: 18 BRPM | OXYGEN SATURATION: 96 % | DIASTOLIC BLOOD PRESSURE: 77 MMHG | WEIGHT: 215 LBS | SYSTOLIC BLOOD PRESSURE: 122 MMHG | HEART RATE: 70 BPM | BODY MASS INDEX: 29.12 KG/M2

## 2022-03-06 DIAGNOSIS — R09.81 NASAL CONGESTION: Primary | ICD-10-CM

## 2022-03-06 PROCEDURE — 99285 EMERGENCY DEPT VISIT HI MDM: CPT

## 2022-03-06 RX ORDER — FLUTICASONE PROPIONATE 50 MCG
2 SPRAY, SUSPENSION (ML) NASAL DAILY
Qty: 16 G | Refills: 2 | Status: SHIPPED | OUTPATIENT
Start: 2022-03-06

## 2022-03-06 ASSESSMENT — ENCOUNTER SYMPTOMS
EYE PAIN: 0
WHEEZING: 0
DIARRHEA: 0
NAUSEA: 0
SHORTNESS OF BREATH: 0
VOMITING: 0
CONSTIPATION: 0
EYE DISCHARGE: 0
EYE REDNESS: 0
RHINORRHEA: 0
SORE THROAT: 0
COUGH: 0
ABDOMINAL PAIN: 0

## 2022-03-06 NOTE — ED TRIAGE NOTES
Pt comes into ER room 2 from triage walking. Pt states that he has had a sinus infection x 3 weeks and he is has not gotten any sleep. He states that he has already been on 2 antibiotics AMOXICILLIN AND ZITHROMAX. He is having some sinus congestion / drainage and is wanting an antibiotic that will actually work for him.

## 2022-03-06 NOTE — ED NOTES
Discharge instructions reviewed with patient and questions answered. Skin warm and dry, color normal for ethnicity. Remains alert and cooperative. Denies further questions or concerns at this time.      Vinny Fry RN  03/06/22 8255

## 2022-03-06 NOTE — ED PROVIDER NOTES
[address]/[facility] COMPLAINT       Chief Complaint   Patient presents with    Sinusitis     X 3 WEEKS        Nurses Notes reviewed and I agree except as noted in the HPI. HISTORY OF PRESENT ILLNESS    Devan Brand is a 70 y.o. male who presents for evaluation of nasal congestion. He states that he feels like he has a break in his head. Patient has had nasal congestion for the past 3 weeks. He was treated with a Z-Jossue in addition to amoxicillin but has had no change in his symptoms. No fevers or chills. No sinus pain. No ear pain or pressure. REVIEW OF SYSTEMS     Review of Systems   Constitutional: Negative for chills and fever. HENT: Positive for congestion. Negative for ear pain, rhinorrhea and sore throat. Eyes: Negative for pain, discharge and redness. Respiratory: Negative for cough, shortness of breath and wheezing. Cardiovascular: Negative for chest pain and leg swelling. Gastrointestinal: Negative for abdominal pain, constipation, diarrhea, nausea and vomiting. Genitourinary: Negative for dysuria and flank pain. Musculoskeletal: Negative for myalgias. Skin: Negative for rash. Neurological: Positive for headaches. Negative for dizziness. PAST MEDICAL HISTORY    has a past medical history of Anxiety, Arthritis, Cancer ([address] COPD exacerbation ([facility] [address] Elevated PSA, Gout, History of heart artery stent, and Hypertension. SURGICAL HISTORY      has a past surgical history that includes other surgical history (2013); Tonsillectomy (1950's); Appendectomy (1970's); Colonoscopy (2009); and Prostatectomy (N/A, 10/6/2017).     CURRENT MEDICATIONS       Discharge Medication List as of 3/6/2022  7:28 AM      CONTINUE these medications which have NOT CHANGED    Details   albuterol sulfate HFA (PROVENTIL HFA) 108 (90 Base) MCG/ACT inhaler Inhale 2 puffs into the lungs every 6 hours as needed for Wheezing, Disp-1 Inhaler, R-3Normal      omeprazole (PRILOSEC) 20 MG delayed release capsule Take 1 capsule by mouth Daily, Disp-90 capsule, R-3Normal      umeclidinium-vilanterol (ANORO ELLIPTA) 62.5-25 MCG/INH AEPB inhaler Inhale 1 puff into the lungs daily, Disp-30 puff, R-11Normal      ZINC PO Take by mouthHistorical Med      NONFORMULARY Indications: joint lax Historical Med      ondansetron (ZOFRAN ODT) 4 MG disintegrating tablet Take 1 tablet by mouth 4 times daily as needed for Nausea or Vomiting, Disp-20 tablet,R-0Print      aspirin 81 MG EC tablet Take 1 tablet by mouth nightly, Disp-30 tablet, R-1Normal      allopurinol (ZYLOPRIM) 300 MG tablet Take 300 mg by mouth dailyHistorical Med      metoprolol succinate (TOPROL XL) 25 MG extended release tablet Take 25 mg by mouth dailyHistorical Med      Clopidogrel Bisulfate (PLAVIX PO) Take 75 mg by mouth daily Historical Med      atorvastatin (LIPITOR) 40 MG tablet Take 40 mg by mouth dailyHistorical Med      albuterol (PROVENTIL) (2.5 MG/3ML) 0.083% nebulizer solution Take 3 mLs by nebulization every 4 hours as needed for Wheezing, Disp-1 Package, R-0Print      Respiratory Therapy Supplies (NEBULIZER COMPRESSOR) KIT ONCE Starting 2020, Disp-1 kit, R-0, Print      lisinopril-hydrochlorothiazide (PRINZIDE;ZESTORETIC) 10-12.5 MG per tablet Take 1 tablet by mouth dailyHistorical Med      diphenhydrAMINE (BENADRYL) 25 MG tablet Take 25 mg by mouth every 8 hours as needed for Itching or AllergiesHistorical Med      Glucosamine 500 MG CAPS Take 1 capsule by mouth daily Historical Med      B Complex Vitamins (VITAMIN B COMPLEX PO) Take 1 tablet by mouth daily. ALLERGIES     is allergic to echinacea-baker seal [nutritional supplements], cephalosporins, and tetracyclines & related. FAMILY HISTORY     He indicated that his mother is alive. He indicated that his father is . family history is not on file.     SOCIAL HISTORY      reports that he quit smoking about 2 years ago. His smoking use included cigarettes. He has a 44.00 pack-year smoking history. He has never used smokeless tobacco. He reports current alcohol use. He reports that he does not use drugs. PHYSICAL EXAM     INITIAL VITALS:  height is 6' (1.829 m) and weight is 215 lb (97.5 kg). His temporal temperature is 96.9 °F (36.1 °C). His blood pressure is 122/77 and his pulse is 70. His respiration is 18 and oxygen saturation is 96%. Physical Exam  Vitals and nursing note reviewed. Constitutional:       General: He is not in acute distress. Appearance: He is not diaphoretic. HENT:      Head: Normocephalic and atraumatic. Right Ear: Tympanic membrane, ear canal and external ear normal.      Left Ear: Tympanic membrane, ear canal and external ear normal.      Nose:      Comments: Nasal turbinates engorged and pale pink bilaterally. There is cloudy discharge noted in bilateral nares. No maxillary ttp. Mouth/Throat:      Mouth: Mucous membranes are moist.      Pharynx: No oropharyngeal exudate or posterior oropharyngeal erythema. Eyes:      General:         Right eye: No discharge. Left eye: No discharge. Extraocular Movements: Extraocular movements intact. Conjunctiva/sclera: Conjunctivae normal.      Pupils: Pupils are equal, round, and reactive to light. Cardiovascular:      Rate and Rhythm: Normal rate and regular rhythm. Heart sounds: Normal heart sounds. Pulmonary:      Effort: Pulmonary effort is normal.      Breath sounds: Normal breath sounds. Abdominal:      General: Bowel sounds are normal. There is no distension. Palpations: Abdomen is soft. Tenderness: There is no abdominal tenderness. Lymphadenopathy:      Cervical: No cervical adenopathy. Skin:     General: Skin is warm and dry. Neurological:      General: No focal deficit present. Mental Status: He is alert and oriented to person, place, and time.    Psychiatric: Mood and Affect: Mood normal.         Behavior: Behavior normal.         DIFFERENTIAL DIAGNOSIS:   Allergic rhinitis, bacterial sinusitis    DIAGNOSTIC RESULTS         LABS:   Labs Reviewed - No data to display    DEPARTMENT COURSE:   Vitals:    Vitals:    03/06/22 0701   BP: 122/77   Pulse: 70   Resp: 18   Temp: 96.9 °F (36.1 °C)   TempSrc: Temporal   SpO2: 96%   Weight: 215 lb (97.5 kg)   Height: 6' (1.829 m)       MDM:  Patient presents for evaluation of nasal congestion. He has almost completed therapy for bacterial sinusitis, 1 dose of amoxicillin left. He is recommended instead to use saline nasal spray and Flonase. He has occasionally used Benadryl and just purchased himself Afrin. Recommended avoiding use of Afrin as he does not have intensified his pain. Follow-up with his PCP as needed. Return to the department for symptom worsening or for evaluation of new concerning symptoms. CRITICAL CARE:   None    CONSULTS:  None    PROCEDURES:  None    FINAL IMPRESSION      1.  Nasal congestion          DISPOSITION/PLAN   Discharge    PATIENT REFERRED TO:  Radha King  [address] [place] [address]  [address]  [phone]    Schedule an appointment as soon as possible for a visit in 10 days  As needed      DISCHARGEMEDICATIONS:  Discharge Medication List as of 3/6/2022  7:28 AM      START taking these medications    Details   fluticasone (FLONASE) 50 MCG/ACT nasal spray 2 sprays by Each Nostril route daily, Disp-16 g, R-2Normal             (Please note that portions of this note were completedwith a voice recognition program.  Efforts were made to edit the dictations but occasionally words are mis-transcribed.)    MD Svetlana Rodriguez MD  03/06/22 7009

## 2022-03-07 ENCOUNTER — OFFICE VISIT (OUTPATIENT)
Dept: UROLOGY | Age: 72
End: 2022-03-07
Payer: MEDICARE

## 2022-03-07 VITALS
BODY MASS INDEX: 29.39 KG/M2 | DIASTOLIC BLOOD PRESSURE: 70 MMHG | WEIGHT: 217 LBS | HEIGHT: 72 IN | SYSTOLIC BLOOD PRESSURE: 126 MMHG

## 2022-03-07 DIAGNOSIS — C61 PROSTATE CANCER (HCC): Primary | ICD-10-CM

## 2022-03-07 DIAGNOSIS — N39.3 SUI (STRESS URINARY INCONTINENCE), MALE: ICD-10-CM

## 2022-03-07 DIAGNOSIS — N52.31 ERECTILE DYSFUNCTION AFTER RADICAL PROSTATECTOMY: ICD-10-CM

## 2022-03-07 PROCEDURE — 99204 OFFICE O/P NEW MOD 45 MIN: CPT | Performed by: UROLOGY

## 2022-03-07 PROCEDURE — G8427 DOCREV CUR MEDS BY ELIG CLIN: HCPCS | Performed by: UROLOGY

## 2022-03-07 PROCEDURE — 1123F ACP DISCUSS/DSCN MKR DOCD: CPT | Performed by: UROLOGY

## 2022-03-07 PROCEDURE — G8484 FLU IMMUNIZE NO ADMIN: HCPCS | Performed by: UROLOGY

## 2022-03-07 PROCEDURE — 4040F PNEUMOC VAC/ADMIN/RCVD: CPT | Performed by: UROLOGY

## 2022-03-07 PROCEDURE — G8417 CALC BMI ABV UP PARAM F/U: HCPCS | Performed by: UROLOGY

## 2022-03-07 PROCEDURE — 3017F COLORECTAL CA SCREEN DOC REV: CPT | Performed by: UROLOGY

## 2022-03-07 PROCEDURE — 1036F TOBACCO NON-USER: CPT | Performed by: UROLOGY

## 2022-03-09 ENCOUNTER — OFFICE VISIT (OUTPATIENT)
Dept: PULMONOLOGY | Age: 72
End: 2022-03-09
Payer: MEDICARE

## 2022-03-09 VITALS
BODY MASS INDEX: 29.47 KG/M2 | TEMPERATURE: 97.4 F | DIASTOLIC BLOOD PRESSURE: 72 MMHG | HEART RATE: 82 BPM | HEIGHT: 72 IN | SYSTOLIC BLOOD PRESSURE: 120 MMHG | OXYGEN SATURATION: 97 % | WEIGHT: 217.6 LBS

## 2022-03-09 DIAGNOSIS — J44.9 COPD, MODERATE (HCC): Primary | ICD-10-CM

## 2022-03-09 DIAGNOSIS — Z87.891 PERSONAL HISTORY OF TOBACCO USE: ICD-10-CM

## 2022-03-09 PROCEDURE — 3017F COLORECTAL CA SCREEN DOC REV: CPT | Performed by: NURSE PRACTITIONER

## 2022-03-09 PROCEDURE — 1123F ACP DISCUSS/DSCN MKR DOCD: CPT | Performed by: NURSE PRACTITIONER

## 2022-03-09 PROCEDURE — 99214 OFFICE O/P EST MOD 30 MIN: CPT | Performed by: NURSE PRACTITIONER

## 2022-03-09 PROCEDURE — 1036F TOBACCO NON-USER: CPT | Performed by: NURSE PRACTITIONER

## 2022-03-09 PROCEDURE — G8484 FLU IMMUNIZE NO ADMIN: HCPCS | Performed by: NURSE PRACTITIONER

## 2022-03-09 PROCEDURE — G8417 CALC BMI ABV UP PARAM F/U: HCPCS | Performed by: NURSE PRACTITIONER

## 2022-03-09 PROCEDURE — G8427 DOCREV CUR MEDS BY ELIG CLIN: HCPCS | Performed by: NURSE PRACTITIONER

## 2022-03-09 PROCEDURE — 4040F PNEUMOC VAC/ADMIN/RCVD: CPT | Performed by: NURSE PRACTITIONER

## 2022-03-09 PROCEDURE — 3023F SPIROM DOC REV: CPT | Performed by: NURSE PRACTITIONER

## 2022-03-09 ASSESSMENT — ENCOUNTER SYMPTOMS
SPUTUM PRODUCTION: 0
WHEEZING: 1
COUGH: 0
DIARRHEA: 0
STRIDOR: 0
CHEST TIGHTNESS: 0
SHORTNESS OF BREATH: 1
VOMITING: 0
NAUSEA: 0

## 2022-03-09 ASSESSMENT — COPD QUESTIONNAIRES: COPD: 1

## 2022-03-09 NOTE — PROGRESS NOTES
Hypertension      SURGICAL HISTORY:  Past Surgical History:   Procedure Laterality Date    APPENDECTOMY  's    COLONOSCOPY      OTHER SURGICAL HISTORY  2013    mole removal    PROSTATECTOMY N/A 10/6/2017    ROBOTIC RADICAL PROSTATECTOMY, BILATERAL PELVIC LYMPH NODE DISSECTION performed by Charly Beckman MD at [place] [address]     SOCIAL HISTORY:  Social History     Tobacco Use    Smoking status: Former Smoker     Packs/day: 1.00     Years: 44.00     Pack years: 44.00     Types: Cigarettes     Quit date: 2020     Years since quittin.1    Smokeless tobacco: Never Used   Substance Use Topics    Alcohol use: Yes     Comment: 1-2 BEERS DAILY    Drug use: No     ALLERGIES:  Allergies   Allergen Reactions    Echinacea-Sigala Seal [Nutritional Supplements]     Cephalosporins Nausea And Vomiting    Tetracyclines & Related Rash     FAMILY HISTORY:No family history on file.   CURRENT MEDICATIONS:  Current Outpatient Medications   Medication Sig Dispense Refill    umeclidinium-vilanterol (ANORO ELLIPTA) 62.5-25 MCG/INH AEPB inhaler Inhale 1 puff into the lungs daily 1 each 11    fluticasone (FLONASE) 50 MCG/ACT nasal spray 2 sprays by Each Nostril route daily 16 g 2    albuterol sulfate HFA (PROVENTIL HFA) 108 (90 Base) MCG/ACT inhaler Inhale 2 puffs into the lungs every 6 hours as needed for Wheezing 1 Inhaler 3    omeprazole (PRILOSEC) 20 MG delayed release capsule Take 1 capsule by mouth Daily 90 capsule 3    ZINC PO Take by mouth      NONFORMULARY Indications: joint lax       ondansetron (ZOFRAN ODT) 4 MG disintegrating tablet Take 1 tablet by mouth 4 times daily as needed for Nausea or Vomiting 20 tablet 0    aspirin 81 MG EC tablet Take 1 tablet by mouth nightly 30 tablet 1    allopurinol (ZYLOPRIM) 300 MG tablet Take 300 mg by mouth daily      metoprolol succinate (TOPROL XL) 25 MG extended release tablet Take 25 mg by mouth daily      atorvastatin (LIPITOR) 40 MG tablet Take 40 mg by mouth daily      albuterol (PROVENTIL) (2.5 MG/3ML) 0.083% nebulizer solution Take 3 mLs by nebulization every 4 hours as needed for Wheezing 1 Package 0    lisinopril-hydrochlorothiazide (PRINZIDE;ZESTORETIC) 10-12.5 MG per tablet Take 1 tablet by mouth daily      diphenhydrAMINE (BENADRYL) 25 MG tablet Take 25 mg by mouth every 8 hours as needed for Itching or Allergies      Glucosamine 500 MG CAPS Take 1 capsule by mouth daily       B Complex Vitamins (VITAMIN B COMPLEX PO) Take 1 tablet by mouth daily.  Respiratory Therapy Supplies (NEBULIZER COMPRESSOR) KIT 1 kit by Does not apply route once for 1 dose 1 kit 0     No current facility-administered medications for this visit. Amanda HER   Review of Systems   Constitutional: Negative for chills, fever and unexpected weight change. HENT: Positive for congestion. Respiratory: Positive for shortness of breath and wheezing. Negative for cough, sputum production, chest tightness and stridor. Cardiovascular: Positive for dyspnea on exertion and PND. Negative for chest pain and leg swelling. Gastrointestinal: Negative for diarrhea, nausea and vomiting. Genitourinary: Negative for dysuria. Physical exam   /72 (Site: Left Upper Arm, Position: Sitting, Cuff Size: Small Adult)   Pulse 82   Temp 97.4 °F (36.3 °C)   Ht 6' (1.829 m)   Wt 217 lb 9.6 oz (98.7 kg)   SpO2 97% Comment: on r/a  BMI 29.51 kg/m²    Wt Readings from Last 3 Encounters:   03/09/22 217 lb 9.6 oz (98.7 kg)   03/07/22 217 lb (98.4 kg)   03/06/22 215 lb (97.5 kg)       Physical Exam  Vitals and nursing note reviewed. Constitutional:       General: He is not in acute distress. Appearance: He is well-developed. HENT:      Head: Normocephalic and atraumatic. Nose: No congestion. Comments: Noted erythema flecks of dried blood Left nare  Neck:      Trachea: No tracheal deviation.    Cardiovascular:      Rate and Rhythm: Normal rate and regular rhythm. Heart sounds: Normal heart sounds. No murmur heard. Pulmonary:      Effort: Pulmonary effort is normal. No respiratory distress. Breath sounds: Normal breath sounds. No stridor. No wheezing or rales. Chest:      Chest wall: No tenderness. Abdominal:      General: Bowel sounds are normal. There is no distension. Palpations: Abdomen is soft. Musculoskeletal:      Cervical back: Neck supple. Skin:     General: Skin is warm and dry. Capillary Refill: Capillary refill takes less than 2 seconds. Neurological:      Mental Status: He is alert and oriented to person, place, and time. Psychiatric:         Behavior: Behavior normal.         Thought Content: Thought content normal.          Results   Lung Nodule Screening     [x] Qualifies    [] Does not qualify   [] Declined    [] Completed     The USPSTF recommends annual screening for lung cancer with low-dose computed tomography (LDCT) in adults aged 48 to [de-identified] years who have a 20 pack-year smoking history and currently smoke or have quit within the past 15 years. Screening should be discontinued once a person has not smoked for 15 years or develops a health problem that substantially limits life expectancy or the ability or willingness to have curative lung surgery. NONCONTRAST SCREENING CT CHEST   2/28/2022   FINDINGS:   Heart/mediastinum: The heart size is normal. Coronary artery calcifications are observed. Calcification of the right pericardium is unchanged. No pericardial effusion is observed. The aorta is not dilated. No lymphadenopathy is present. Lungs: No focal consolidation, pleural effusion, or pneumothorax is identified. No pulmonary mass or nodules are observed. Dependent atelectasis versus mild scarring at the lung bases is unchanged. The central airways are patent and unremarkable bilaterally. Upper abdomen: No acute findings are noted in the limited images through the upper abdomen. Musculoskeletal: Diffuse osteopenia is present. Degenerative disc disease in the thoracic spine is unchanged. The visualized skeletal structures appear intact. Impression:  1. No suspicious pulmonary mass or nodules. 2. No acute intrathoracic process. 3. LUNGRADS ASSESSMENT VALUE: 1  LUNG RADS: Category 1  RECOMMENDATION: Annual low-dose noncontrast CT thorax for lung cancer screening. The LUNG RADS RECOMMENDATIONS for monitoring lung nodules listed below (ACR- Lung-RADS Version 1.0 Assessment Categories Release date\" April 28, 2014)  LUNG RADS RECOMMENDATIONS;   1.  Normal, continue annual screening   2. Benign appearance or behavior, continue annual screening   3.  6 month CT recommended   4A.  3 month CT recommended; may consider PET/CT   4B. Additional diagnostics and/or tissue sampling recommended   4X. Additional diagnostics and/or tissue sampling recommended        Assessment      Diagnosis Orders   1. COPD, moderate (HCC)  umeclidinium-vilanterol (ANORO ELLIPTA) 62.5-25 MCG/INH AEPB inhaler   2. Personal history of tobacco use           Plan   -Reports doing well with Anoro no exacerbations since last appointment  -Albuterol 2 puff Q6 hrs PRN for SOB/wheezing   -Discussed albuterol inhaler use. Reviewed signs and symptoms indicating need for use including Shortness of breath and wheezing. Discussed with patient the importance of using inhaler within the prescribed time frames. Patient verbalized understanding to use within prescribed time frame.  Patient also verbalized understanding that if they experience no relief after using albuterol and resting for 15 minutes they need to go to nearest ER or call 911.  -LDCT reviewed with patient will need repeat in 1 yr   -Completed azithromycin and amoxicillin residual symptoms possibly viral  -Advised to maintain pneumonia vaccine with PCP and to take flu vaccine this coming season.  -Advised patient to call office with any changes, questions, or concerns regarding respiratory status    Will see Altaf Rodriguez back in: 6 months no test    Juan Carlos Cornejo CNP  3/9/2022

## 2022-04-25 ENCOUNTER — TELEPHONE (OUTPATIENT)
Dept: PULMONOLOGY | Age: 72
End: 2022-04-25

## 2022-04-25 DIAGNOSIS — J44.9 COPD, MODERATE (HCC): ICD-10-CM

## 2022-04-25 NOTE — TELEPHONE ENCOUNTER
John called in [address] in march for 1 month supply, Fabian Moyer is calling to request his rx be changed to a 3 month supply. He uses [facility] [place].

## 2022-08-11 ENCOUNTER — TELEPHONE (OUTPATIENT)
Dept: FAMILY MEDICINE CLINIC | Age: 72
End: 2022-08-11

## 2022-08-11 DIAGNOSIS — K21.9 GASTROESOPHAGEAL REFLUX DISEASE: ICD-10-CM

## 2022-08-11 RX ORDER — OMEPRAZOLE 20 MG/1
20 CAPSULE, DELAYED RELEASE ORAL DAILY
Qty: 90 CAPSULE | Refills: 0 | Status: SHIPPED | OUTPATIENT
Start: 2022-08-11

## 2022-08-11 NOTE — TELEPHONE ENCOUNTER
Andrew Tan called requesting a refill on the following medications:  Requested Prescriptions     Pending Prescriptions Disp Refills    omeprazole (PRILOSEC) 20 MG delayed release capsule 90 capsule 3     Sig: Take 1 capsule by mouth in the morning.      Pharmacy verified:  [email or web address]  [address] [facility], [address] [place] [address]  [address] [phone] [facility] [phone]    Date of last visit: 03/09/2022  Date of next visit (if applicable): 7/35/9527

## 2022-08-11 NOTE — TELEPHONE ENCOUNTER
Left message for patient to return call. We received a medication refill request for Lisinopril/HCTZ 10-12.5 once daily for 90 day supply. Checking to see if patient is going to continue to see Dr Yessica Smith in [place] or if he is going to establish with provider in [place].

## 2022-08-11 NOTE — TELEPHONE ENCOUNTER
Patient called back, stated he is not following Dr. Jenny Calderon to [facility] and that he found a temporary PCP in [place] [address] until Dr. Jenny Calderon returns to [place] [address] in 2023. He stated the prescription request was taken care of already.

## 2022-09-20 ENCOUNTER — OFFICE VISIT (OUTPATIENT)
Dept: PULMONOLOGY | Age: 72
End: 2022-09-20
Payer: MEDICARE

## 2022-09-20 VITALS
OXYGEN SATURATION: 94 % | HEIGHT: 72 IN | WEIGHT: 212.8 LBS | HEART RATE: 68 BPM | DIASTOLIC BLOOD PRESSURE: 70 MMHG | BODY MASS INDEX: 28.82 KG/M2 | SYSTOLIC BLOOD PRESSURE: 122 MMHG | TEMPERATURE: 97.1 F

## 2022-09-20 DIAGNOSIS — Z87.891 PERSONAL HISTORY OF TOBACCO USE: ICD-10-CM

## 2022-09-20 DIAGNOSIS — J44.9 COPD, MODERATE (HCC): Primary | ICD-10-CM

## 2022-09-20 PROCEDURE — 1036F TOBACCO NON-USER: CPT | Performed by: NURSE PRACTITIONER

## 2022-09-20 PROCEDURE — 99213 OFFICE O/P EST LOW 20 MIN: CPT | Performed by: NURSE PRACTITIONER

## 2022-09-20 PROCEDURE — 3017F COLORECTAL CA SCREEN DOC REV: CPT | Performed by: NURSE PRACTITIONER

## 2022-09-20 PROCEDURE — G8417 CALC BMI ABV UP PARAM F/U: HCPCS | Performed by: NURSE PRACTITIONER

## 2022-09-20 PROCEDURE — G8427 DOCREV CUR MEDS BY ELIG CLIN: HCPCS | Performed by: NURSE PRACTITIONER

## 2022-09-20 PROCEDURE — 1123F ACP DISCUSS/DSCN MKR DOCD: CPT | Performed by: NURSE PRACTITIONER

## 2022-09-20 PROCEDURE — 3023F SPIROM DOC REV: CPT | Performed by: NURSE PRACTITIONER

## 2022-09-20 PROCEDURE — G0296 VISIT TO DETERM LDCT ELIG: HCPCS | Performed by: NURSE PRACTITIONER

## 2022-09-20 RX ORDER — ALBUTEROL SULFATE 2.5 MG/3ML
2.5 SOLUTION RESPIRATORY (INHALATION) EVERY 4 HOURS PRN
Qty: 360 EACH | Refills: 3 | Status: SHIPPED | OUTPATIENT
Start: 2022-09-20 | End: 2022-09-20

## 2022-09-20 RX ORDER — OMEPRAZOLE 20 MG/1
20 CAPSULE, DELAYED RELEASE ORAL DAILY
Qty: 90 CAPSULE | Refills: 3 | Status: CANCELLED | OUTPATIENT
Start: 2022-09-20

## 2022-09-20 RX ORDER — ALBUTEROL SULFATE 90 UG/1
2 AEROSOL, METERED RESPIRATORY (INHALATION) EVERY 4 HOURS PRN
Qty: 1 EACH | Refills: 3 | Status: SHIPPED | OUTPATIENT
Start: 2022-09-20

## 2022-09-20 RX ORDER — ALBUTEROL SULFATE 2.5 MG/3ML
2.5 SOLUTION RESPIRATORY (INHALATION) EVERY 4 HOURS PRN
Qty: 360 EACH | Refills: 3 | Status: SHIPPED | OUTPATIENT
Start: 2022-09-20

## 2022-09-20 ASSESSMENT — ENCOUNTER SYMPTOMS
VOMITING: 0
STRIDOR: 0
GASTROINTESTINAL NEGATIVE: 1
SHORTNESS OF BREATH: 1
CHEST TIGHTNESS: 0
NAUSEA: 0
ALLERGIC/IMMUNOLOGIC NEGATIVE: 1
WHEEZING: 0
EYES NEGATIVE: 1
DIARRHEA: 0

## 2022-09-20 NOTE — PROGRESS NOTES
Patient is experiencing SOB: no    Patient is experiencing wheezing: Yes     Patient states they have had a Weak, productive = 2 cough. Phlegm is occasional    Patient is coughing up blood: no    Patient has been experiencing chest pains: non-existent    Patient is currently taking the following inhaler(s): Albuterol , Anoro     Patient is currently taking the following nebulizer treatment(s): Albuterol     Patient is using their rescue inhaler 1 times per month . Patient needs refills of the following medications: Patient is unsure at this time if he needs any refills.

## 2022-09-20 NOTE — PROGRESS NOTES
[facility]    Patient: Mendez Villarreal, 67 y.o.   : 1950    Pt of Dr. Jennifer Cannon   Patient presents with    Follow-up     6 month COPD follow up         HPI  Lidya Carmen is here for follow up for his Moderate COPD with no testing to review today. LDCT done 2022 no significant findings   Former smoker quit  with a 44 PYH  6MWT done 3/3//21- no home oxygen needed   A1A MM  Omeprazole daily for GERD- currently following with GI having episodes once a month or every two months of in the middle of the night diarrhea and dry heaves - Colonoscopy/EGD on 10/3/2022  Follows with allergy specialists- currently receiving injections per them     Patient is experiencing SOB: no     Patient is experiencing wheezing: Yes      Patient states they have had a Weak, productive = 2 cough. Phlegm is occasional     Patient is coughing up blood: no     Patient has been experiencing chest pains: non-existent     Patient is currently taking the following inhaler(s): Albuterol , Anoro      Patient is currently taking the following nebulizer treatment(s): Albuterol      Patient is using their rescue inhaler 1 times per month . Patient needs refills of the following medications: Patient is unsure at this time if he needs any refills. Progress History:   Since last visit any new medical issues? No  New ER or hospital visits? No  Any new or changes in medicines? No  Using inhalers? Yes   Are they helpful?  Yes   Flu vaccine not for this season yet  Pneumonia vaccine per PCP   Covid vaccine x 1 J&J no boosters   Past Medical hx   PMH:  Past Medical History:   Diagnosis Date    Anxiety     Arthritis     Cancer ([facility] 2017    prostate cancer 8-    COPD exacerbation ([facility] 2020    Elevated PSA     Gout     History of heart artery stent 2019    LAD with Dr. Brad Guillermo at [facility]     Hypertension      SURGICAL HISTORY:  Past Surgical History:   Procedure Laterality Date    APPENDECTOMY      COLONOSCOPY      OTHER SURGICAL HISTORY  2013    mole removal    PROSTATECTOMY N/A 10/6/2017    ROBOTIC RADICAL PROSTATECTOMY, BILATERAL PELVIC LYMPH NODE DISSECTION performed by Sam Draper MD at [address] [place] [id number]     SOCIAL HISTORY:  Social History     Tobacco Use    Smoking status: Former     Packs/day: 1.00     Years: 44.00     Pack years: 44.00     Types: Cigarettes     Quit date: 2020     Years since quittin.7    Smokeless tobacco: Never   Substance Use Topics    Alcohol use: Yes     Comment: 1-2 BEERS DAILY    Drug use: No     ALLERGIES:  Allergies   Allergen Reactions    Echinacea-Sigala Seal [Nutritional Supplements]     Cephalosporins Nausea And Vomiting    Tetracyclines & Related Rash     FAMILY HISTORY:History reviewed. No pertinent family history. CURRENT MEDICATIONS:  Current Outpatient Medications   Medication Sig Dispense Refill    Ixekizumab (TALTZ SC) Inject into the skin Patient stated he takes this once a month      albuterol (PROVENTIL) (2.5 MG/3ML) 0.083% nebulizer solution Take 3 mLs by nebulization every 4 hours as needed for Wheezing or Shortness of Breath 360 each 3    albuterol sulfate HFA (PROVENTIL HFA) 108 (90 Base) MCG/ACT inhaler Inhale 2 puffs into the lungs every 4 hours as needed for Wheezing or Shortness of Breath 1 each 3    omeprazole (PRILOSEC) 20 MG delayed release capsule Take 1 capsule by mouth in the morning.  90 capsule 0    umeclidinium-vilanterol (ANORO ELLIPTA) 62.5-25 MCG/INH AEPB inhaler Inhale 1 puff into the lungs daily 3 each 3    fluticasone (FLONASE) 50 MCG/ACT nasal spray 2 sprays by Each Nostril route daily 16 g 2    ZINC PO Take by mouth      NONFORMULARY Indications: joint lax       ondansetron (ZOFRAN ODT) 4 MG disintegrating tablet Take 1 tablet by mouth 4 times daily as needed for Nausea or Vomiting 20 tablet 0    aspirin 81 MG EC tablet Take 1 tablet by mouth nightly 30 tablet 1    allopurinol (ZYLOPRIM) 300 MG tablet Take 300 mg by mouth daily      metoprolol succinate (TOPROL XL) 25 MG extended release tablet Take 25 mg by mouth daily      atorvastatin (LIPITOR) 40 MG tablet Take 40 mg by mouth daily      Respiratory Therapy Supplies (NEBULIZER COMPRESSOR) KIT 1 kit by Does not apply route once for 1 dose 1 kit 0    lisinopril-hydrochlorothiazide (PRINZIDE;ZESTORETIC) 10-12.5 MG per tablet Take 1 tablet by mouth daily      diphenhydrAMINE (BENADRYL) 25 MG tablet Take 25 mg by mouth every 8 hours as needed for Itching or Allergies      Glucosamine 500 MG CAPS Take 1 capsule by mouth daily       B Complex Vitamins (VITAMIN B COMPLEX PO) Take 1 tablet by mouth daily. No current facility-administered medications for this visit. ROS   Review of Systems   Constitutional: Negative. Negative for chills, fever and unexpected weight change. HENT: Negative. Eyes: Negative. Respiratory:  Positive for shortness of breath (with exertion only). Negative for chest tightness, wheezing and stridor. Cardiovascular:  Negative for chest pain and leg swelling. Gastrointestinal: Negative. Negative for diarrhea, nausea and vomiting. Endocrine: Negative. Genitourinary: Negative. Negative for dysuria. Musculoskeletal: Negative. Skin: Negative. Allergic/Immunologic: Negative. Neurological: Negative. Hematological: Negative. Psychiatric/Behavioral: Negative. Physical exam   /70   Pulse 68   Temp 97.1 °F (36.2 °C)   Ht 6' (1.829 m)   Wt 212 lb 12.8 oz (96.5 kg)   SpO2 94% Comment: Patient on room air  BMI 28.86 kg/m²    Wt Readings from Last 3 Encounters:   09/20/22 212 lb 12.8 oz (96.5 kg)   03/09/22 217 lb 9.6 oz (98.7 kg)   03/07/22 217 lb (98.4 kg)       Physical Exam  Vitals and nursing note reviewed. Constitutional:       General: He is not in acute distress. Appearance: He is well-developed.    HENT: Head: Normocephalic and atraumatic. Neck:      Trachea: No tracheal deviation. Cardiovascular:      Rate and Rhythm: Normal rate and regular rhythm. Heart sounds: Normal heart sounds. No murmur heard. Pulmonary:      Effort: Pulmonary effort is normal. No respiratory distress. Breath sounds: Normal breath sounds. No stridor. No wheezing or rales. Chest:      Chest wall: No tenderness. Abdominal:      General: Bowel sounds are normal. There is no distension. Palpations: Abdomen is soft. Skin:     General: Skin is warm and dry. Capillary Refill: Capillary refill takes less than 2 seconds. Neurological:      Mental Status: He is alert and oriented to person, place, and time. Psychiatric:         Behavior: Behavior normal.         Thought Content: Thought content normal.         Judgment: Judgment normal.        results   Lung Nodule Screening     [x] Qualifies    [] Does not qualify   [] Declined    [x] Completed    The USPSTF recommends annual screening for lung cancer with low-dose computed tomography (LDCT) in adults aged 48 to [de-identified] years who have a 30 pack-year smoking history and currently smoke or have quit within the past 20 years. Screening should be discontinued once a person has not smoked for 20 years or develops a health problem that substantially limits life expectancy or the ability or willingness to have curative lung surgery. NONCONTRAST SCREENING CT CHEST   2/28/2022   FINDINGS:   Heart/mediastinum: The heart size is normal. Coronary artery calcifications are observed. Calcification of the right pericardium is unchanged. No pericardial effusion is observed. The aorta is not dilated. No lymphadenopathy is present. Lungs: No focal consolidation, pleural effusion, or pneumothorax is identified. No pulmonary mass or nodules are observed. Dependent atelectasis versus mild scarring at the lung bases is unchanged.  The central airways are patent and unremarkable bilaterally. Upper abdomen: No acute findings are noted in the limited images through the upper abdomen. Musculoskeletal: Diffuse osteopenia is present. Degenerative disc disease in the thoracic spine is unchanged. The visualized skeletal structures appear intact. Impression:  1. No suspicious pulmonary mass or nodules. 2. No acute intrathoracic process. 3. LUNGRADS ASSESSMENT VALUE: 1  LUNG RADS: Category 1  RECOMMENDATION: Annual low-dose noncontrast CT thorax for lung cancer screening. The LUNG RADS RECOMMENDATIONS for monitoring lung nodules listed below (ACR- Lung-RADS Version 1.0 Assessment Categories Release date\" April 28, 2014)  LUNG RADS RECOMMENDATIONS;   1.  Normal, continue annual screening   2. Benign appearance or behavior, continue annual screening   3.  6 month CT recommended   4A.  3 month CT recommended; may consider PET/CT   4B. Additional diagnostics and/or tissue sampling recommended   4X. Additional diagnostics and/or tissue sampling recommended         Assessment      Diagnosis Orders   1. COPD, moderate (HCC)  albuterol (PROVENTIL) (2.5 MG/3ML) 0.083% nebulizer solution    albuterol sulfate HFA (PROVENTIL HFA) 108 (90 Base) MCG/ACT inhaler      2. Gastroesophageal reflux disease        3.  Personal history of tobacco use  WI VISIT TO DISCUSS LUNG CA SCREEN W LDCT    CT Lung Screen (Annual)            Plan   -LDCT in 6 months for annual lung cancer screening   -Continue Anoro 1 puff daily refill not needed today   -RX done for Albuterol neb every 4 hours PRN for SOB/wheezing  -RX done for Albuterol HFA every 4 hours PRN- inhaler and nebulizer NOT to be used within 2 hours of each other  -Advised to maintain pneumonia vaccine with PCP and to take flu vaccine this coming season.  -Advised patient to call office with any changes, questions, or concerns regarding respiratory status    Will see Jonas wall in: 6 months    Ester Gee CNP  9/20/2022    Low Dose CT (LDCT) Lung Screening criteria met:     Age 50-77(Medicare) or 50-80 ([facility])   Pack year smoking >20   Still smoking or less than 15 year since quit   No sign or symptoms of lung cancer   > 11 months since last LDCT     Risks and benefits of lung cancer screening with LDCT scans discussed:    Significance of positive screen - False-positive LDCT results often occur. 95% of all positive results do not lead to a diagnosis of cancer. Usually further imaging can resolve most false-positive results; however, some patients may require invasive procedures. Over diagnosis risk - 10% to 12% of screen-detected lung cancer cases are over diagnosed--that is, the cancer would not have been detected in the patient's lifetime without the screening. Need for follow up screens annually to continue lung cancer screening effectiveness     Risks associated with radiation from annual LDCT- Radiation exposure is about the same as for a mammogram, which is about 1/3 of the annual background radiation exposure from everyday life. Starting screening at age 54 is not likely to increase cancer risk from radiation exposure. Patients with comorbidities resulting in life expectancy of < 10 years, or that would preclude treatment of an abnormality identified on CT, should not be screened due to lack of benefit.     To obtain maximal benefit from this screening, smoking cessation and long-term abstinence from smoking is critical

## 2022-10-04 ENCOUNTER — TELEPHONE (OUTPATIENT)
Dept: UROLOGY | Age: 72
End: 2022-10-04

## 2022-10-04 NOTE — TELEPHONE ENCOUNTER
Patient is scheduled 10/11/2022 with Dr Kobe Hyatt and is due to have his psa checked. [facility] Please advise.

## 2022-10-07 LAB — PROSTATE SPECIFIC ANTIGEN: 0.1 NG/ML

## 2022-10-11 ENCOUNTER — TELEPHONE (OUTPATIENT)
Dept: UROLOGY | Age: 72
End: 2022-10-11

## 2022-10-11 ENCOUNTER — OFFICE VISIT (OUTPATIENT)
Dept: UROLOGY | Age: 72
End: 2022-10-11
Payer: MEDICARE

## 2022-10-11 VITALS — WEIGHT: 207 LBS | BODY MASS INDEX: 28.04 KG/M2 | HEIGHT: 72 IN

## 2022-10-11 DIAGNOSIS — N39.3 SUI (STRESS URINARY INCONTINENCE), MALE: ICD-10-CM

## 2022-10-11 DIAGNOSIS — C61 PROSTATE CANCER (HCC): Primary | ICD-10-CM

## 2022-10-11 DIAGNOSIS — N52.31 ERECTILE DYSFUNCTION AFTER RADICAL PROSTATECTOMY: ICD-10-CM

## 2022-10-11 PROCEDURE — 1123F ACP DISCUSS/DSCN MKR DOCD: CPT | Performed by: UROLOGY

## 2022-10-11 PROCEDURE — 3017F COLORECTAL CA SCREEN DOC REV: CPT | Performed by: UROLOGY

## 2022-10-11 PROCEDURE — 99214 OFFICE O/P EST MOD 30 MIN: CPT | Performed by: UROLOGY

## 2022-10-11 PROCEDURE — 1036F TOBACCO NON-USER: CPT | Performed by: UROLOGY

## 2022-10-11 PROCEDURE — G8427 DOCREV CUR MEDS BY ELIG CLIN: HCPCS | Performed by: UROLOGY

## 2022-10-11 PROCEDURE — G8484 FLU IMMUNIZE NO ADMIN: HCPCS | Performed by: UROLOGY

## 2022-10-11 PROCEDURE — G8417 CALC BMI ABV UP PARAM F/U: HCPCS | Performed by: UROLOGY

## 2022-10-11 NOTE — TELEPHONE ENCOUNTER
Patient scheduled for PET CT TUMOR IMAGE SKULL THIGH PSMA  at [facility] on 10/24/2022 ARRIVAL OF 115PM FOR A 130PM SCAN.     Order mailed with instructions or given to the patient in the office

## 2022-10-11 NOTE — PROGRESS NOTES
Cecilia Kruger MD.    [address] [facility] [id number]  Dept: [phone]  Dept Fax: [phone]  Loc: [address] [facility] Office Note -     Patient:  Madalyn Bowen  YOB: 1950    The patient is a 67 y.o. male who presents today for evaluation of the following problems:   Chief Complaint   Patient presents with    Prostate Cancer     PSA prior     referred/consultation requested by AIDA Hanson CNP. History of Present Illness:    Prostate cancer  S/p prostatectomy with mosqueda in past  Margins were negative  Rising PSA    ED  Not bothered    TAMELA  Resolved  No issues with this    Summary of Previous Records:  Madalyn Bowen is a 79 y.o. male was seen in follow up for prostate cancer. He  underwent Robotic-Assisted Laparoscopic Radical Prostatectomy (RALRP) and bilateral pelvic lymph node dissection 10/6/2017. Final pathology resulted pT2cN0, margins negative, Gleasons 3+4, perineural invasion. He is doing well. He was noting gross hematuria for a day and this resolved. He states he is ambulating well, appetite is well, bowel movements occurring. Pain is minimal, catheter is more discomforting than anything. He also denies night sweats, fatigue, malaise, nausea, vomiting, chest pain, SOB, fever, chills. Requested/reviewed records from AIDA Hanson CNP office and/or outside physician/EMR    (Patient's old records have been requested, reviewed and pertinent findings summarized in today's note.)    Procedures Today: N/A      Last several PSA's:  Lab Results   Component Value Date    PSA 0.10 10/07/2022    PSA 0.07 02/15/2022    PSA 0.04 08/19/2021       Last total testosterone:  No results found for: TESTOSTERONE    Urinalysis today:  No results found for this visit on 10/11/22.     Last BUN and creatinine:  Lab Results   Component Value Date    BUN 16 02/15/2022     Lab Results   Component Value Date    CREATININE 1.5 02/15/2022         Imaging Reviewed during this Office Visit:   Soham Adkins MD independently reviewed the images and verified the radiology reports from:    No results found. PAST MEDICAL, FAMILY AND SOCIAL HISTORY:  Past Medical History:   Diagnosis Date    Anxiety     Arthritis     Cancer ([facility] [address] 2017    prostate cancer 8-2017    COPD exacerbation ([facility] [address] 7/5/2020    Elevated PSA     Gout     History of heart artery stent 03/14/2019    LAD with Dr. Ranjit Carey at [facility]     Hypertension      Past Surgical History:   Procedure Laterality Date    APPENDECTOMY  1970's    COLONOSCOPY  2009    OTHER SURGICAL HISTORY  2013    mole removal    PROSTATECTOMY N/A 10/6/2017    ROBOTIC RADICAL PROSTATECTOMY, BILATERAL PELVIC LYMPH NODE DISSECTION performed by Karuna Castillo MD at [address] [facility]  [address]     No family history on file. Outpatient Medications Marked as Taking for the 10/11/22 encounter (Office Visit) with Jazmyn Friend MD   Medication Sig Dispense Refill    Ixekizumab (TALTZ SC) Inject into the skin Patient stated he takes this once a month      albuterol sulfate HFA (PROVENTIL HFA) 108 (90 Base) MCG/ACT inhaler Inhale 2 puffs into the lungs every 4 hours as needed for Wheezing or Shortness of Breath 1 each 3    albuterol (PROVENTIL) (2.5 MG/3ML) 0.083% nebulizer solution Take 3 mLs by nebulization every 4 hours as needed for Wheezing or Shortness of Breath 360 each 3    omeprazole (PRILOSEC) 20 MG delayed release capsule Take 1 capsule by mouth in the morning.  90 capsule 0    umeclidinium-vilanterol (ANORO ELLIPTA) 62.5-25 MCG/INH AEPB inhaler Inhale 1 puff into the lungs daily 3 each 3    fluticasone (FLONASE) 50 MCG/ACT nasal spray 2 sprays by Each Nostril route daily 16 g 2    ZINC PO Take by mouth      NONFORMULARY Indications: joint lax       ondansetron (ZOFRAN ODT) 4 MG disintegrating tablet Take 1 tablet by mouth 4 times daily as needed for Nausea or Vomiting 20 tablet 0    aspirin 81 MG EC tablet Take 1 tablet by mouth nightly 30 tablet 1    allopurinol (ZYLOPRIM) 300 MG tablet Take 300 mg by mouth daily      metoprolol succinate (TOPROL XL) 25 MG extended release tablet Take 25 mg by mouth daily      atorvastatin (LIPITOR) 40 MG tablet Take 40 mg by mouth daily      lisinopril-hydrochlorothiazide (PRINZIDE;ZESTORETIC) 10-12.5 MG per tablet Take 1 tablet by mouth daily      diphenhydrAMINE (BENADRYL) 25 MG tablet Take 25 mg by mouth every 8 hours as needed for Itching or Allergies      Glucosamine 500 MG CAPS Take 1 capsule by mouth daily       B Complex Vitamins (VITAMIN B COMPLEX PO) Take 1 tablet by mouth daily. Echinacea-baker seal [nutritional supplements], Cephalosporins, and Tetracyclines & related  Social History     Tobacco Use   Smoking Status Former    Packs/day: 1.00    Years: 44.00    Pack years: 44.00    Types: Cigarettes    Quit date: 2020    Years since quittin.7   Smokeless Tobacco Never      (If patient a smoker, smoking cessation counseling offered)   Social History     Substance and Sexual Activity   Alcohol Use Yes    Comment: 1-2 BEERS DAILY       REVIEW OF SYSTEMS:  Constitutional: negative  Eyes: negative  Respiratory: negative  Cardiovascular: negative  Gastrointestinal: negative  Genitourinary: see HPI  Musculoskeletal: negative  Skin: negative   Neurological: negative  Hematological/Lymphatic: negative  Psychological: negative      Physical Exam:    This a 67 y.o. male  There were no vitals filed for this visit. Body mass index is 28.07 kg/m². Constitutional: Patient in no acute distress;       Assessment and Plan        1. Prostate cancer ([facility] [address]    2. TAMELA (stress urinary incontinence), male    3. Erectile dysfunction after radical prostatectomy               Plan:      Prostate Cancer- PSA rising. Hx of prostatectomy. Concern that this may trend in the direction of biochemical recurrence.  Will obtain PSMA scan. TAMEAL- not bothered  ED- not bothered    Follow up with PSMA scan--may benefit from xrt       Prescriptions Ordered:  No orders of the defined types were placed in this encounter. Orders Placed:  No orders of the defined types were placed in this encounter.            Zee Sanchez MD

## 2022-10-25 ENCOUNTER — HOSPITAL ENCOUNTER (OUTPATIENT)
Dept: CT IMAGING | Age: 72
Discharge: HOME | End: 2022-10-25

## 2022-10-25 DIAGNOSIS — Z00.6 ENCOUNTER FOR EXAMINATION FOR NORMAL COMPARISON AND CONTROL IN CLINICAL RESEARCH PROGRAM: ICD-10-CM

## 2022-11-01 ENCOUNTER — SCHEDULED TELEPHONE ENCOUNTER (OUTPATIENT)
Dept: UROLOGY | Age: 72
End: 2022-11-01
Payer: MEDICARE

## 2022-11-01 DIAGNOSIS — C61 PROSTATE CANCER (HCC): Primary | ICD-10-CM

## 2022-11-01 PROCEDURE — 99441 PR PHYS/QHP TELEPHONE EVALUATION 5-10 MIN: CPT | Performed by: UROLOGY

## 2022-11-03 ENCOUNTER — OFFICE VISIT (OUTPATIENT)
Dept: PULMONOLOGY | Age: 72
End: 2022-11-03
Payer: MEDICARE

## 2022-11-03 VITALS
HEIGHT: 72 IN | BODY MASS INDEX: 28.47 KG/M2 | TEMPERATURE: 97.7 F | SYSTOLIC BLOOD PRESSURE: 102 MMHG | WEIGHT: 210.2 LBS | DIASTOLIC BLOOD PRESSURE: 58 MMHG | OXYGEN SATURATION: 95 % | HEART RATE: 68 BPM

## 2022-11-03 DIAGNOSIS — J44.1 COPD EXACERBATION (HCC): ICD-10-CM

## 2022-11-03 DIAGNOSIS — R05.1 ACUTE COUGH: ICD-10-CM

## 2022-11-03 DIAGNOSIS — R06.02 SOB (SHORTNESS OF BREATH): Primary | ICD-10-CM

## 2022-11-03 DIAGNOSIS — J44.9 COPD, MODERATE (HCC): ICD-10-CM

## 2022-11-03 DIAGNOSIS — Z87.891 PERSONAL HISTORY OF TOBACCO USE: ICD-10-CM

## 2022-11-03 PROCEDURE — 99214 OFFICE O/P EST MOD 30 MIN: CPT | Performed by: NURSE PRACTITIONER

## 2022-11-03 PROCEDURE — 1036F TOBACCO NON-USER: CPT | Performed by: NURSE PRACTITIONER

## 2022-11-03 PROCEDURE — G8417 CALC BMI ABV UP PARAM F/U: HCPCS | Performed by: NURSE PRACTITIONER

## 2022-11-03 PROCEDURE — G8484 FLU IMMUNIZE NO ADMIN: HCPCS | Performed by: NURSE PRACTITIONER

## 2022-11-03 PROCEDURE — 94618 PULMONARY STRESS TESTING: CPT | Performed by: NURSE PRACTITIONER

## 2022-11-03 PROCEDURE — 3023F SPIROM DOC REV: CPT | Performed by: NURSE PRACTITIONER

## 2022-11-03 PROCEDURE — 3017F COLORECTAL CA SCREEN DOC REV: CPT | Performed by: NURSE PRACTITIONER

## 2022-11-03 PROCEDURE — G8427 DOCREV CUR MEDS BY ELIG CLIN: HCPCS | Performed by: NURSE PRACTITIONER

## 2022-11-03 PROCEDURE — 1123F ACP DISCUSS/DSCN MKR DOCD: CPT | Performed by: NURSE PRACTITIONER

## 2022-11-03 RX ORDER — PREDNISONE 10 MG/1
TABLET ORAL
Qty: 30 TABLET | Refills: 0 | Status: SHIPPED | OUTPATIENT
Start: 2022-11-03 | End: 2022-11-13

## 2022-11-03 RX ORDER — AZITHROMYCIN 250 MG/1
250 TABLET, FILM COATED ORAL SEE ADMIN INSTRUCTIONS
Qty: 6 TABLET | Refills: 0 | Status: SHIPPED | OUTPATIENT
Start: 2022-11-03 | End: 2022-11-08

## 2022-11-03 ASSESSMENT — ENCOUNTER SYMPTOMS
NAUSEA: 0
COUGH: 1
DIARRHEA: 0
STRIDOR: 0
GASTROINTESTINAL NEGATIVE: 1
RHINORRHEA: 1
WHEEZING: 1
CHEST TIGHTNESS: 0
SHORTNESS OF BREATH: 1
ALLERGIC/IMMUNOLOGIC NEGATIVE: 1
EYES NEGATIVE: 1
VOMITING: 0

## 2022-11-03 NOTE — PATIENT INSTRUCTIONS
-6MWT today in the office no oxygen needed with rest or activity   -Continue Anoro 1 puff daily   -Continue Albuterol PRN for SOB/wheezing   -Prednisone taper   -[facility] as directed   -LDCT unable to be done in March due to recent PET scan   -Advised to maintain pneumonia vaccine with PCP and to take flu vaccine this coming season.  -Advised patient to call office with any changes, questions, or concerns regarding respiratory status  -Breztri samples given to patient x 2.  Patient will try and let us know if he wants to switch   -Mucinex at home twice a day   Will see Veronica Meyers back in: 3 months

## 2022-11-03 NOTE — PROGRESS NOTES
Patient is experiencing SOB: Yes    Patient is experiencing wheezing: Yes    Patient states they have had a Strong, productive = 1 cough. Having coughing spells. Phlegm is occasional    Patient is coughing up blood: no    Patient has been experiencing chest pains: non-existent    Patient is currently taking the following inhaler(s): Anoro, Albuterol    Patient is currently taking the following nebulizer treatment(s): Albuterol    Patient is using their rescue inhaler 3-4 times per day. Other: Pt states he was in the hospital twice since we last seen him for COPD.

## 2022-11-03 NOTE — PROGRESS NOTES
[facility]    Patient: Omer Gordillo, 67 y.o.   : 1950  11/3/2022    Pt of Dr. Viridiana Mejia   Patient presents with    Follow-up     6 month f/u pt having a hard time breathing. HPI  Little Mason is here for follow up for his Moderate COPD. Patient wanted to move up appt due to being more SOB  Former smoker - LDCT done 2022 w/no significant findings   Current inhaler use of Anoro and Albuterol PRN   Current home oxygen order at 2LPM at rest and 3LPM with activity- last 6MWT done 20  PET CT done recently with no abnormal lung findings   HX of prostate cancer with prostatectomy   Patient is experiencing SOB: Yes     Patient is experiencing wheezing: Yes     Patient states they have had a Strong, productive = 1 cough. Having coughing spells. Phlegm is occasional     Patient is coughing up blood: no     Patient has been experiencing chest pains: non-existent     Patient is currently taking the following inhaler(s): Anoro, Albuterol     Patient is currently taking the following nebulizer treatment(s): Albuterol     Patient is using their rescue inhaler 3-4 times per day. Other: Pt states he was in the hospital twice since we last seen him for COPD. Progress History:   Since last visit any new medical issues? Yes increase in SOB  New ER or hospital visits? No  Any new or changes in medicines? No  Using inhalers? Yes   Are they helpful? At times   Flu vaccine? Pneumonia vaccine?   Covid vaccine- J&J    Past Medical hx   PMH:  Past Medical History:   Diagnosis Date    Anxiety     Arthritis     Cancer ([facility] [address] 2017    prostate cancer 8-    COPD exacerbation ([facility] [address] 2020    Elevated PSA     Gout     History of heart artery stent 2019    LAD with Dr. Lata Sandhu at [facility]     Hypertension      SURGICAL HISTORY:  Past Surgical History:   Procedure Laterality Date    APPENDECTOMY  1970's    COLONOSCOPY  2009    OTHER SURGICAL HISTORY  2013    mole removal    PROSTATECTOMY N/A 10/6/2017    ROBOTIC RADICAL PROSTATECTOMY, BILATERAL PELVIC LYMPH NODE DISSECTION performed by Joseph Osuna MD at [facility]  [address]     SOCIAL HISTORY:  Social History     Tobacco Use    Smoking status: Former     Packs/day: 1.00     Years: 44.00     Pack years: 44.00     Types: Cigarettes     Quit date: 2020     Years since quittin.8    Smokeless tobacco: Never   Substance Use Topics    Alcohol use: Not Currently     Comment: 1-2 BEERS DAILY    Drug use: No     ALLERGIES:  Allergies   Allergen Reactions    Echinacea-Sigala Seal [Nutritional Supplements]     Cephalosporins Nausea And Vomiting    Tetracyclines & Related Rash     FAMILY HISTORY:History reviewed. No pertinent family history. CURRENT MEDICATIONS:  Current Outpatient Medications   Medication Sig Dispense Refill    predniSONE (DELTASONE) 10 MG tablet 4 tablets for 3 days, then 3 tablets for 3 days, then 2 tablets for 3 days, then 1 tablet for 3 days 30 tablet 0    azithromycin (ZITHROMAX) 250 MG tablet Take 1 tablet by mouth See Admin Instructions for 5 days 500mg on day 1 followed by 250mg on days 2 - 5 6 tablet 0    Ixekizumab (TALTZ SC) Inject into the skin Patient stated he takes this once a month      albuterol sulfate HFA (PROVENTIL HFA) 108 (90 Base) MCG/ACT inhaler Inhale 2 puffs into the lungs every 4 hours as needed for Wheezing or Shortness of Breath 1 each 3    albuterol (PROVENTIL) (2.5 MG/3ML) 0.083% nebulizer solution Take 3 mLs by nebulization every 4 hours as needed for Wheezing or Shortness of Breath 360 each 3    omeprazole (PRILOSEC) 20 MG delayed release capsule Take 1 capsule by mouth in the morning.  90 capsule 0    umeclidinium-vilanterol (ANORO ELLIPTA) 62.5-25 MCG/INH AEPB inhaler Inhale 1 puff into the lungs daily 3 each 3    fluticasone (FLONASE) 50 MCG/ACT nasal spray 2 sprays by Each Nostril route daily 16 g 2    ZINC PO Take by mouth NONFORMULARY Indications: joint lax       ondansetron (ZOFRAN ODT) 4 MG disintegrating tablet Take 1 tablet by mouth 4 times daily as needed for Nausea or Vomiting 20 tablet 0    aspirin 81 MG EC tablet Take 1 tablet by mouth nightly 30 tablet 1    allopurinol (ZYLOPRIM) 300 MG tablet Take 300 mg by mouth daily      metoprolol succinate (TOPROL XL) 25 MG extended release tablet Take 25 mg by mouth daily      atorvastatin (LIPITOR) 40 MG tablet Take 40 mg by mouth daily      lisinopril-hydrochlorothiazide (PRINZIDE;ZESTORETIC) 10-12.5 MG per tablet Take 1 tablet by mouth daily      diphenhydrAMINE (BENADRYL) 25 MG tablet Take 25 mg by mouth every 8 hours as needed for Itching or Allergies      Glucosamine 500 MG CAPS Take 1 capsule by mouth daily       B Complex Vitamins (VITAMIN B COMPLEX PO) Take 1 tablet by mouth daily. Respiratory Therapy Supplies (NEBULIZER COMPRESSOR) KIT 1 kit by Does not apply route once for 1 dose 1 kit 0     No current facility-administered medications for this visit. ROS   Review of Systems   Constitutional:  Positive for fatigue. Negative for chills, fever and unexpected weight change. HENT:  Positive for congestion and rhinorrhea. Eyes: Negative. Respiratory:  Positive for cough (NP), shortness of breath and wheezing. Negative for chest tightness and stridor. Cardiovascular:  Negative for chest pain and leg swelling. Gastrointestinal: Negative. Negative for diarrhea, nausea and vomiting. Endocrine: Negative. Genitourinary: Negative. Negative for dysuria. Musculoskeletal: Negative. Skin: Negative. Allergic/Immunologic: Negative. Neurological: Negative. Hematological: Negative. Psychiatric/Behavioral: Negative.         Physical exam   BP (!) 102/58   Pulse 68   Temp 97.7 °F (36.5 °C)   Ht 6' (1.829 m)   Wt 210 lb 3.2 oz (95.3 kg)   SpO2 95%   BMI 28.51 kg/m²    Wt Readings from Last 3 Encounters:   11/03/22 210 lb 3.2 oz (95.3 kg) 10/11/22 207 lb (93.9 kg)   09/20/22 212 lb 12.8 oz (96.5 kg)       Physical Exam  Vitals and nursing note reviewed. Constitutional:       General: He is not in acute distress. Appearance: He is well-developed. HENT:      Head: Normocephalic and atraumatic. Neck:      Trachea: No tracheal deviation. Cardiovascular:      Rate and Rhythm: Normal rate and regular rhythm. Heart sounds: Normal heart sounds. No murmur heard. Pulmonary:      Effort: Pulmonary effort is normal. No respiratory distress. Breath sounds: No stridor. Wheezing (I/E) present. No rales. Chest:      Chest wall: No tenderness. Abdominal:      General: Bowel sounds are normal. There is no distension. Palpations: Abdomen is soft. Skin:     General: Skin is warm and dry. Capillary Refill: Capillary refill takes less than 2 seconds. Neurological:      Mental Status: He is alert and oriented to person, place, and time. Psychiatric:         Behavior: Behavior normal.         Thought Content: Thought content normal.         Judgment: Judgment normal.        Results   Lung Nodule Screening     [x] Qualifies    [] Does not qualify   [] Declined    [] Completed    The USPSTF recommends annual screening for lung cancer with low-dose computed tomography (LDCT) in adults aged 48 to [de-identified] years who have a 30 pack-year smoking history and currently smoke or have quit within the past 20 years. Screening should be discontinued once a person has not smoked for 20 years or develops a health problem that substantially limits life expectancy or the ability or willingness to have curative lung surgery. Six Minute Walk Test  Olinda Paul 1950    Six minute walk test done in my office today by my medical assistant. Markel's oxygen saturation at rest on room air was 96%. His oxygen saturation dropped to 93% on room air with exertion after walking 864 feet and within 6 minutes.      Patient ambulated a total of 864 feet with oxygen. Resting Dyspnea/Kenya score was 4 / 7  and 8 / 9  upon completion of the walk. Resting heart rate was  78 bpm and 96 bpm upon completing the walk. 10/25/22   PET/CT Prostate Skull-Thigh     Findings:  Normal and physiologic distribution of radioisotope identified in the  expected intensity of the hepatic and splenic parenchyma, urinary tract  and gastrointestinal structures. There is gross anatomic distribution of  the intracranial contents. Physiologic activity in the salivary glands. INDEX LESION SIZE SUV INTERPRETATION:  1. None    CT portion of the exam:    The lungs are normal.  There is no demonstrated pleural abnormality. Normal heart and pericardium. There are calcifications of the coronary  arteries. Normal mediastinum. Normal hilar regions. Normal unenhanced pulmonary  arteries. Normal aorta arch and descending thoracic aorta. Normal liver. There are multiple gallstones. Normal spleen. Normal  pancreas. Normal bilateral adrenal glands. Normal right kidney. Normal left kidney. Normal visualized stomach. Normal small intestine. There are multiple  colonic diverticula consistent with diverticulosis. Mildly prominent  lymph nodes in the right lower quadrant measure up to 7 mm in short axis  without associated abnormal isotope activity. No dominant shiela mass;  likely represent reactive mesenteric adenitis. There is diffuse atherosclerotic calcification of the abdominal aorta with  elongation and tortuosity, but without a demonstrated aneurysm. Normal  inferior vena cava. Normal urinary bladder. There are diffuse degenerative changes of the visualized lumbar spine. No  lytic or sclerotic bone lesion. IMPRESSION:    1. NEGATIVE EXAMINATION. No scintigraphic evidence of viable neoplasm.     2.  Mildly prominent right lower quadrant mesenteric lymph nodes  (subcentimeter) without dominant shiela mass (or abnormal isotope activity)  likely represent reactive mesenteric shiela enlargement/adenitis, although  nonspecific. 3.  Chronic changes, as detailed above. Assessment      Diagnosis Orders   1. SOB (shortness of breath)  6 Minute Walk Test    ECHO Complete 2D W Doppler W Color      2. COPD, moderate ([address]  6 Minute Walk Test      3. Personal history of tobacco use        4. COPD exacerbation (HCC)  predniSONE (DELTASONE) 10 MG tablet    azithromycin (ZITHROMAX) 250 MG tablet      5. Acute cough  predniSONE (DELTASONE) 10 MG tablet    azithromycin (ZITHROMAX) 250 MG tablet            Plan   -6MWT today in the office no oxygen needed with rest or activity   -Continue Anoro 1 puff daily   -Continue Albuterol PRN for SOB/wheezing   -Prednisone taper   -Cobyang Yuver as directed   -LDCT unable to be done in March due to recent PET scan   -Advised to maintain pneumonia vaccine with PCP and to take flu vaccine this coming season.  -Advised patient to call office with any changes, questions, or concerns regarding respiratory status  -Breztri samples given to patient x 2.  Patient will try and let us know if he wants to switch   -Mucinex at home twice a day   Will see Prabha Bettencourt back in: 3 months    [facility]  11/3/2022

## 2023-01-02 NOTE — PROGRESS NOTES
Arianna Main is a 67 y.o. male evaluated via telephone on 11/1/2022 for      Documentation:  I communicated with the patient and/or health care decision maker about     Prostate Cancer- PSA rising. Hx of prostatectomy. Concern that this may trend in the direction of biochemical recurrence. PSMA scan showed no mets    TAMELA- not bothered  ED- not bothered    Plan:  Pt prefers close monitoring  PSA in three months    . Details of this discussion including any medical advice provided: psa three months    Total Time: minutes: 5-10 minutes    Arianna Main was evaluated through a synchronous (real-time) audio encounter. Patient identification was verified at the start of the visit. He (or guardian if applicable) is aware that this is a billable service, which includes applicable co-pays. This visit was conducted with the patient's (and/or legal guardian's) verbal consent. He has not had a related appointment within my department in the past 7 days or scheduled within the next 24 hours. The patient was located at Home: [address] [place] [address]. [address] [place] [address] [address] [address]. The provider was located at [facility] ([address] [place], [place] [address]t): [address] [place] [address] [address]  [place],  [address].     Note: not billable if this call serves to triage the patient into an appointment for the relevant concern    Collin Wright MD

## 2023-02-02 ENCOUNTER — OFFICE VISIT (OUTPATIENT)
Dept: PULMONOLOGY | Age: 73
End: 2023-02-02
Payer: MEDICARE

## 2023-02-02 VITALS
OXYGEN SATURATION: 96 % | BODY MASS INDEX: 30.4 KG/M2 | HEART RATE: 82 BPM | HEIGHT: 72 IN | DIASTOLIC BLOOD PRESSURE: 68 MMHG | SYSTOLIC BLOOD PRESSURE: 110 MMHG | WEIGHT: 224.4 LBS

## 2023-02-02 DIAGNOSIS — J44.9 COPD, MODERATE (HCC): Primary | ICD-10-CM

## 2023-02-02 DIAGNOSIS — Z87.891 PERSONAL HISTORY OF TOBACCO USE: ICD-10-CM

## 2023-02-02 LAB — PROSTATE SPECIFIC ANTIGEN: 0.09 NG/ML

## 2023-02-02 PROCEDURE — G8427 DOCREV CUR MEDS BY ELIG CLIN: HCPCS | Performed by: NURSE PRACTITIONER

## 2023-02-02 PROCEDURE — 99213 OFFICE O/P EST LOW 20 MIN: CPT | Performed by: NURSE PRACTITIONER

## 2023-02-02 PROCEDURE — G8484 FLU IMMUNIZE NO ADMIN: HCPCS | Performed by: NURSE PRACTITIONER

## 2023-02-02 PROCEDURE — 3023F SPIROM DOC REV: CPT | Performed by: NURSE PRACTITIONER

## 2023-02-02 PROCEDURE — G8417 CALC BMI ABV UP PARAM F/U: HCPCS | Performed by: NURSE PRACTITIONER

## 2023-02-02 PROCEDURE — 3017F COLORECTAL CA SCREEN DOC REV: CPT | Performed by: NURSE PRACTITIONER

## 2023-02-02 PROCEDURE — G0296 VISIT TO DETERM LDCT ELIG: HCPCS | Performed by: NURSE PRACTITIONER

## 2023-02-02 PROCEDURE — 1036F TOBACCO NON-USER: CPT | Performed by: NURSE PRACTITIONER

## 2023-02-02 PROCEDURE — 1123F ACP DISCUSS/DSCN MKR DOCD: CPT | Performed by: NURSE PRACTITIONER

## 2023-02-02 RX ORDER — UMECLIDINIUM BROMIDE AND VILANTEROL TRIFENATATE 62.5; 25 UG/1; UG/1
1 POWDER RESPIRATORY (INHALATION) DAILY
Qty: 3 EACH | Refills: 3 | Status: SHIPPED | OUTPATIENT
Start: 2023-02-02

## 2023-02-02 ASSESSMENT — ENCOUNTER SYMPTOMS
CHEST TIGHTNESS: 0
ALLERGIC/IMMUNOLOGIC NEGATIVE: 1
DIARRHEA: 0
EYES NEGATIVE: 1
WHEEZING: 0
VOMITING: 0
NAUSEA: 0
STRIDOR: 0
SHORTNESS OF BREATH: 1
GASTROINTESTINAL NEGATIVE: 1

## 2023-02-02 NOTE — PROGRESS NOTES
[facility]    Patient: Romeo Degroot, 67 y.o.   : 1950      Subjective     Chief Complaint   Patient presents with    Follow-up     3 month copd med check        HPI  Cash Lieberman is here for follow up for Moderate COPD  Former smoker quit  with a 40 PYH  No home oxygen use - last 6MWT 22  PET scan done 10/24/22 due to Prostate issues- lungs unremarkable at that time     [facility] [place]  Not having any issues on Anoro. Would like to stick with Anoro, he states that Comiso worked but not as well as Anoro. Using albuterol about once every 2 weeks. No pulmonary issues or concerns today     Progress History:   Since last visit any new medical issues? No  New ER or hospital visits? No  Any new or changes in medicines? No  Using inhalers? Yes   Are they helpful? Yes   Flu vaccine- No  Pneumonia vaccine- patient states UTD  Covid vaccine- J&J    Past Medical hx   PMH:  Past Medical History:   Diagnosis Date    Anxiety     Arthritis     Cancer ([facility] [address] 2017    prostate cancer -    COPD exacerbation ([facility] [address] 2020    Elevated PSA     Gout     History of heart artery stent 2019    LAD with Dr. Ho Gaviria at [facility]     Hypertension      SURGICAL HISTORY:  Past Surgical History:   Procedure Laterality Date    APPENDECTOMY  's    COLONOSCOPY      OTHER SURGICAL HISTORY  2013    mole removal    PROSTATECTOMY N/A 10/6/2017    ROBOTIC RADICAL PROSTATECTOMY, BILATERAL PELVIC LYMPH NODE DISSECTION performed by Jose L Navas MD at [address] [facility]  [address]     SOCIAL HISTORY:  Social History     Tobacco Use    Smoking status: Former     Packs/day: 1.00     Years: 44.00     Pack years: 44.00     Types: Cigarettes     Quit date: 2020     Years since quitting: 3.0    Smokeless tobacco: Never   Substance Use Topics    Alcohol use: Not Currently     Comment: 1-2 BEERS DAILY    Drug use:  No ALLERGIES:  Allergies   Allergen Reactions    Echinacea-Sigala Seal [Nutritional Supplements]     Cephalosporins Nausea And Vomiting    Tetracyclines & Related Rash     FAMILY HISTORY:History reviewed. No pertinent family history. CURRENT MEDICATIONS:  Current Outpatient Medications   Medication Sig Dispense Refill    Umeclidinium-Vilanterol (ANORO ELLIPTA) 62.5-25 MCG/ACT AEPB Inhale 1 puff into the lungs daily 3 each 3    Ixekizumab (TALTZ SC) Inject into the skin Patient stated he takes this once a month      albuterol sulfate HFA (PROVENTIL HFA) 108 (90 Base) MCG/ACT inhaler Inhale 2 puffs into the lungs every 4 hours as needed for Wheezing or Shortness of Breath 1 each 3    albuterol (PROVENTIL) (2.5 MG/3ML) 0.083% nebulizer solution Take 3 mLs by nebulization every 4 hours as needed for Wheezing or Shortness of Breath 360 each 3    omeprazole (PRILOSEC) 20 MG delayed release capsule Take 1 capsule by mouth in the morning. 90 capsule 0    fluticasone (FLONASE) 50 MCG/ACT nasal spray 2 sprays by Each Nostril route daily 16 g 2    NONFORMULARY Indications: joint lax       aspirin 81 MG EC tablet Take 1 tablet by mouth nightly 30 tablet 1    allopurinol (ZYLOPRIM) 300 MG tablet Take 300 mg by mouth daily      metoprolol succinate (TOPROL XL) 25 MG extended release tablet Take 25 mg by mouth daily      atorvastatin (LIPITOR) 40 MG tablet Take 40 mg by mouth daily      lisinopril-hydrochlorothiazide (PRINZIDE;ZESTORETIC) 10-12.5 MG per tablet Take 1 tablet by mouth daily      diphenhydrAMINE (BENADRYL) 25 MG tablet Take 25 mg by mouth every 8 hours as needed for Itching or Allergies      Glucosamine 500 MG CAPS Take 1 capsule by mouth daily       B Complex Vitamins (VITAMIN B COMPLEX PO) Take 1 tablet by mouth daily. Respiratory Therapy Supplies (NEBULIZER COMPRESSOR) KIT 1 kit by Does not apply route once for 1 dose 1 kit 0     No current facility-administered medications for this visit.         ROS Review of Systems   Constitutional: Negative. Negative for chills, fever and unexpected weight change. HENT: Negative. Eyes: Negative. Respiratory:  Positive for shortness of breath (exertion only). Negative for chest tightness, wheezing and stridor. Cardiovascular:  Negative for chest pain and leg swelling. Gastrointestinal: Negative. Negative for diarrhea, nausea and vomiting. Endocrine: Negative. Genitourinary: Negative. Negative for dysuria. Musculoskeletal: Negative. Skin: Negative. Allergic/Immunologic: Negative. Neurological: Negative. Hematological: Negative. Psychiatric/Behavioral: Negative. Physical exam   /68 (Site: Left Upper Arm, Position: Sitting, Cuff Size: Medium Adult)   Pulse 82   Ht 6' (1.829 m)   Wt 224 lb 6.4 oz (101.8 kg)   SpO2 96%   BMI 30.43 kg/m²    Wt Readings from Last 3 Encounters:   02/02/23 224 lb 6.4 oz (101.8 kg)   11/03/22 210 lb 3.2 oz (95.3 kg)   10/11/22 207 lb (93.9 kg)       Physical Exam  Vitals and nursing note reviewed. Constitutional:       General: He is not in acute distress. Appearance: He is well-developed. HENT:      Head: Normocephalic and atraumatic. Neck:      Trachea: No tracheal deviation. Cardiovascular:      Rate and Rhythm: Normal rate and regular rhythm. Heart sounds: Normal heart sounds. No murmur heard. Pulmonary:      Effort: Pulmonary effort is normal. No respiratory distress. Breath sounds: Normal breath sounds. No stridor. No wheezing or rales. Chest:      Chest wall: No tenderness. Abdominal:      General: Bowel sounds are normal. There is no distension. Palpations: Abdomen is soft. Skin:     General: Skin is warm and dry. Capillary Refill: Capillary refill takes less than 2 seconds. Neurological:      Mental Status: He is alert and oriented to person, place, and time. Psychiatric:         Behavior: Behavior normal.         Thought Content:  Thought content normal.         Judgment: Judgment normal.        Results   Lung Nodule Screening     [x] Qualifies    [] Does not qualify   [] Declined    [] Completed    The USPSTF recommends annual screening for lung cancer with low-dose computed tomography (LDCT) in adults aged 48 to 80 years who have a 30 pack-year smoking history and currently smoke or have quit within the past 20 years. Screening should be discontinued once a person has not smoked for 20 years or develops a health problem that substantially limits life expectancy or the ability or willingness to have curative lung surgery. 10/24/22      PET/CT Prostate Skull-Thigh      Findings:   Normal and physiologic distribution of radioisotope identified in the   expected intensity of the hepatic and splenic parenchyma, urinary tract   and gastrointestinal structures. There is gross anatomic distribution of   the intracranial contents. Physiologic activity in the salivary glands. INDEX LESION SIZE SUV INTERPRETATION:   1. None     CT portion of the exam:     The lungs are normal.  There is no demonstrated pleural abnormality. Normal heart and pericardium. There are calcifications of the coronary   arteries. Normal mediastinum. Normal hilar regions. Normal unenhanced pulmonary   arteries. Normal aorta arch and descending thoracic aorta. Normal liver. There are multiple gallstones. Normal spleen. Normal   pancreas. Normal bilateral adrenal glands. Normal right kidney. Normal left kidney. Normal visualized stomach. Normal small intestine. There are multiple   colonic diverticula consistent with diverticulosis. Mildly prominent   lymph nodes in the right lower quadrant measure up to 7 mm in short axis   without associated abnormal isotope activity. No dominant shiela mass;   likely represent reactive mesenteric adenitis.      There is diffuse atherosclerotic calcification of the abdominal aorta with   elongation and tortuosity, but without a demonstrated aneurysm. Normal   inferior vena cava. Normal urinary bladder. There are diffuse degenerative changes of the visualized lumbar spine. No   lytic or sclerotic bone lesion. IMPRESSION:      1. NEGATIVE EXAMINATION. No scintigraphic evidence of viable neoplasm. 2. Mildly prominent right lower quadrant mesenteric lymph nodes   (subcentimeter) without dominant shiela mass (or abnormal isotope activity)   likely represent reactive mesenteric shiela enlargement/adenitis, although   nonspecific. 3. Chronic changes, as detailed above. Assessment      Diagnosis Orders   1. COPD, moderate ([address]  Umeclidinium-Vilanterol (ANORO ELLIPTA) 62.5-25 MCG/ACT AEPB      2. Personal history of tobacco use  KY VISIT TO DISCUSS LUNG CA SCREEN W LDCT    CT Lung Screen (Annual/Baseline)            Plan   -LDCT 8 months  -Continue Anoro 1 puff daily  -Continue Albuterol PRN for SOB/wheezing   -Advised to maintain pneumonia vaccine with PCP and to take flu vaccine this coming season.  -Advised patient to call office with any changes, questions, or concerns regarding respiratory status  -Activity as tolerated     Will see Lc Hudson back in: 8 months    Joanna Zepeda CNP  2/2/2023    Discussed with the patient the current USPSTF guidelines released March 9, 2021 for screening for lung cancer. For adults aged 48 to [de-identified] years who have a 20 pack-year smoking history and currently smoke or have quit within the past 15 years the grade B recommendation is to:  Screen for lung cancer with low-dose computed tomography (LDCT) every year. Stop screening once a person has not smoked for 15 years or has a health problem that limits life expectancy or the ability to have lung surgery. The patient  reports that he quit smoking about 3 years ago. His smoking use included cigarettes. He has a 44.00 pack-year smoking history.  He has never used smokeless tobacco.. Discussed with patient the risks and benefits of screening, including over-diagnosis, false positive rate, and total radiation exposure. The patient currently exhibits no signs or symptoms suggestive of lung cancer. Discussed with patient the importance of compliance with yearly annual lung cancer screenings and willingness to undergo diagnosis and treatment if screening scan is positive. In addition, the patient was counseled regarding the importance of remaining smoke free and/or total smoking cessation.     Also reviewed the following if the patient has Medicare that as of February 10, 2022, Medicare only covers LDCT screening in patients aged 51-72 with at least a 20 pack-year smoking history who currently smoke or have quit in the last 15 years

## 2023-02-02 NOTE — PATIENT INSTRUCTIONS

## 2023-02-02 NOTE — PROGRESS NOTES
Needs refills on [facility] to [facility] in [place]    Not having any issues on Anoro. Would like to stick with Anoro, he states that Comiso worked but not as well as Anoro. Interested in knowing if he can have breztri samples for [place]. Using albuterol about once every 2 weeks. Has questions about his Anoro script running out in march?

## 2023-02-07 ENCOUNTER — OFFICE VISIT (OUTPATIENT)
Dept: UROLOGY | Age: 73
End: 2023-02-07
Payer: MEDICARE

## 2023-02-07 VITALS — WEIGHT: 220 LBS | BODY MASS INDEX: 29.8 KG/M2 | HEIGHT: 72 IN | RESPIRATION RATE: 18 BRPM

## 2023-02-07 DIAGNOSIS — C61 PROSTATE CANCER (HCC): Primary | ICD-10-CM

## 2023-02-07 PROCEDURE — 3017F COLORECTAL CA SCREEN DOC REV: CPT

## 2023-02-07 PROCEDURE — G8417 CALC BMI ABV UP PARAM F/U: HCPCS

## 2023-02-07 PROCEDURE — 99214 OFFICE O/P EST MOD 30 MIN: CPT

## 2023-02-07 PROCEDURE — G8427 DOCREV CUR MEDS BY ELIG CLIN: HCPCS

## 2023-02-07 PROCEDURE — G8484 FLU IMMUNIZE NO ADMIN: HCPCS

## 2023-02-07 PROCEDURE — 1123F ACP DISCUSS/DSCN MKR DOCD: CPT

## 2023-02-07 PROCEDURE — 1036F TOBACCO NON-USER: CPT

## 2023-02-07 NOTE — PROGRESS NOTES
HPI  Allison Graham is a 67 y.o. male that presents to the urology clinic for PSA review        2/7/23  Allison Graham is a 79 y.o. male was seen in follow up for prostate cancer. He  underwent Robotic-Assisted Laparoscopic Radical Prostatectomy (RALRP) and bilateral pelvic lymph node dissection 10/6/2017. Final pathology resulted pT2cN0, margins negative, Gleasons 3+4, perineural invasion. He is doing well. Patient follows with Dr. Kal Guaman. Prostate Cancer- PSA rising. Hx of prostatectomy. Concern that this may trend in the direction of biochemical recurrence. PSMA scan showed no mets     TAMELA- not bothered    ED- not bothered      No fevers or chills. No nausea or vomiting. No new concerns at this OV      Most recent PSA:   Lab Results   Component Value Date    PSA 0.09 02/02/2023    PSA 0.10 10/07/2022    PSA 0.07 02/15/2022       Last total testosterone:  No results found for: TESTOSTERONE      Last BUN and creatinine:  Lab Results   Component Value Date    BUN 16 02/15/2022     Lab Results   Component Value Date    CREATININE 1.5 02/15/2022           PAST MEDICAL, FAMILY AND SOCIAL HISTORY UPDATE:  Past Medical History:   Diagnosis Date    Anxiety     Arthritis     Cancer ([facility] [address] 2017    prostate cancer 8-2017    COPD exacerbation ([facility] [address] 7/5/2020    Elevated PSA     Gout     History of heart artery stent 03/14/2019    LAD with Dr. Aileen Mora at [facility]     Hypertension      Past Surgical History:   Procedure Laterality Date    APPENDECTOMY  1970's    COLONOSCOPY  2009    OTHER SURGICAL HISTORY  2013    mole removal    PROSTATECTOMY N/A 10/6/2017    ROBOTIC RADICAL PROSTATECTOMY, BILATERAL PELVIC LYMPH NODE DISSECTION performed by Rafael Plummer MD at [facility],[address]  [address]     No family history on file. No outpatient medications have been marked as taking for the 2/7/23 encounter (Appointment) with Afshan Bloom PA-C.        Echinacea-baker seal [nutritional supplements], Cephalosporins, and Tetracyclines & related  Social History     Tobacco Use   Smoking Status Former    Packs/day: 1.00    Years: 44.00    Pack years: 44.00    Types: Cigarettes    Quit date: 01/2020    Years since quitting: 3.1   Smokeless Tobacco Never       Social History     Substance and Sexual Activity   Alcohol Use Not Currently    Comment: 1-2 BEERS DAILY       REVIEW OF SYSTEMS:  Constitutional: negative  Eyes: negative  Respiratory: negative  Cardiovascular: negative  Gastrointestinal: negative  Musculoskeletal: negative  Genitourinary: negative except for what is in HPI  Skin: negative   Neurological: negative  Hematological/Lymphatic: negative  Psychological: negative    Physical Exam:    There were no vitals filed for this visit. Patient is a 67 y.o. male in no acute distress and alert and oriented to person, place and time. NAD, A/o  Non labored respiration  Normal peripheral pulses  Skin- warm and dry  Psych- normal mood and affect      Assessment and Plan   Prostate Cancer  Elevated PSA  Stress Urinary Incontinence- Resolved. No concerns  ED- No concerns    -S/p prostatectomy. Patient prefers close monitoring.   -will repeat PSA in 6 months.      Case discussed with Dr. Sammi Rivero PA-C  Urology

## 2023-07-28 ENCOUNTER — HOSPITAL ENCOUNTER (OUTPATIENT)
Dept: CT IMAGING | Age: 73
Discharge: HOME | End: 2023-07-28
Payer: MEDICARE

## 2023-07-28 DIAGNOSIS — J32.9 RECURRENT RHINOSINUSITIS: ICD-10-CM

## 2023-07-28 PROCEDURE — 70486 CT MAXILLOFACIAL W/O DYE: CPT

## 2023-08-10 ENCOUNTER — TELEPHONE (OUTPATIENT)
Dept: UROLOGY | Age: 73
End: 2023-08-10

## 2023-08-10 NOTE — TELEPHONE ENCOUNTER
Recommend patient continue with PSA checks given hx of prostate cancer. He did not get his PSA checked or make it to his appointment on 8/10/2023. Please offer to reschedule.      Can do a virtual visit if this would be easier

## 2023-08-24 LAB — PROSTATE SPECIFIC ANTIGEN: 0.07 NG/ML

## 2023-08-31 ENCOUNTER — TELEPHONE (OUTPATIENT)
Dept: UROLOGY | Age: 73
End: 2023-08-31

## 2023-09-14 ENCOUNTER — OFFICE VISIT (OUTPATIENT)
Dept: UROLOGY | Age: 73
End: 2023-09-14
Payer: MEDICARE

## 2023-09-14 VITALS — HEIGHT: 72 IN | WEIGHT: 229 LBS | BODY MASS INDEX: 31.02 KG/M2 | RESPIRATION RATE: 16 BRPM

## 2023-09-14 DIAGNOSIS — N52.31 ERECTILE DYSFUNCTION AFTER RADICAL PROSTATECTOMY: ICD-10-CM

## 2023-09-14 DIAGNOSIS — N39.3 SUI (STRESS URINARY INCONTINENCE), MALE: ICD-10-CM

## 2023-09-14 DIAGNOSIS — C61 PROSTATE CANCER (HCC): Primary | ICD-10-CM

## 2023-09-14 PROCEDURE — 3017F COLORECTAL CA SCREEN DOC REV: CPT | Performed by: UROLOGY

## 2023-09-14 PROCEDURE — 1123F ACP DISCUSS/DSCN MKR DOCD: CPT | Performed by: UROLOGY

## 2023-09-14 PROCEDURE — 1036F TOBACCO NON-USER: CPT | Performed by: UROLOGY

## 2023-09-14 PROCEDURE — 99214 OFFICE O/P EST MOD 30 MIN: CPT | Performed by: UROLOGY

## 2023-09-14 PROCEDURE — G8427 DOCREV CUR MEDS BY ELIG CLIN: HCPCS | Performed by: UROLOGY

## 2023-09-14 PROCEDURE — G8417 CALC BMI ABV UP PARAM F/U: HCPCS | Performed by: UROLOGY

## 2023-09-14 NOTE — PROGRESS NOTES
Elda Echevarria MD.    [address]  [address] [address]  Dept: [phone]  Dept Fax: [phone]  Loc: [facility] Urology Office Note -     Patient:  Luba Lynn  YOB: 1950    The patient is a 68 y.o. male who presents today for evaluation of the following problems:   Chief Complaint   Patient presents with    Prostate Cancer    Follow-up     6 month follow up, psa done prior    referred/consultation requested by AIDA Renee CNP. History of Present Illness:    Prostate cancer  S/p prostatectomy with mosqueda in past  Margins were negative  Rising PSA    ED  Not bothered    TAMELA  Resolved  No issues with this    Summary of Previous Records:  Luba Lnyn is a 79 y.o. male was seen in follow up for prostate cancer. He  underwent Robotic-Assisted Laparoscopic Radical Prostatectomy (RALRP) and bilateral pelvic lymph node dissection 10/6/2017. Final pathology resulted pT2cN0, margins negative, Gleasons 3+4, perineural invasion. He is doing well. He was noting gross hematuria for a day and this resolved. He states he is ambulating well, appetite is well, bowel movements occurring. Pain is minimal, catheter is more discomforting than anything. He also denies night sweats, fatigue, malaise, nausea, vomiting, chest pain, SOB, fever, chills. Requested/reviewed records from AIDA Renee CNP office and/or outside physician/EMR    (Patient's old records have been requested, reviewed and pertinent findings summarized in today's note.)    Procedures Today: N/A      Last several PSA's:  Lab Results   Component Value Date    PSA 0.07 08/24/2023    PSA 0.09 02/02/2023    PSA 0.10 10/07/2022       Last total testosterone:  No results found for: \"TESTOSTERONE\"    Urinalysis today:  No results found for this visit on 09/14/23.     Last BUN and creatinine:  Lab Results   Component Value

## 2023-10-26 ENCOUNTER — HOSPITAL ENCOUNTER (OUTPATIENT)
Dept: CT IMAGING | Age: 73
Discharge: HOME | End: 2023-10-26
Payer: MEDICARE

## 2023-10-26 DIAGNOSIS — Z87.891 PERSONAL HISTORY OF TOBACCO USE: ICD-10-CM

## 2023-10-26 PROCEDURE — 71271 CT THORAX LUNG CANCER SCR C-: CPT

## 2023-10-31 ENCOUNTER — OFFICE VISIT (OUTPATIENT)
Dept: PULMONOLOGY | Age: 73
End: 2023-10-31
Payer: MEDICARE

## 2023-10-31 VITALS
TEMPERATURE: 97.7 F | HEART RATE: 68 BPM | SYSTOLIC BLOOD PRESSURE: 118 MMHG | BODY MASS INDEX: 31.07 KG/M2 | DIASTOLIC BLOOD PRESSURE: 84 MMHG | HEIGHT: 72 IN | OXYGEN SATURATION: 95 % | WEIGHT: 229.4 LBS

## 2023-10-31 DIAGNOSIS — J44.9 COPD, MODERATE (HCC): Primary | ICD-10-CM

## 2023-10-31 DIAGNOSIS — Z87.891 PERSONAL HISTORY OF TOBACCO USE: ICD-10-CM

## 2023-10-31 PROCEDURE — G0296 VISIT TO DETERM LDCT ELIG: HCPCS | Performed by: NURSE PRACTITIONER

## 2023-10-31 PROCEDURE — 99213 OFFICE O/P EST LOW 20 MIN: CPT | Performed by: NURSE PRACTITIONER

## 2023-10-31 PROCEDURE — G8427 DOCREV CUR MEDS BY ELIG CLIN: HCPCS | Performed by: NURSE PRACTITIONER

## 2023-10-31 PROCEDURE — 1123F ACP DISCUSS/DSCN MKR DOCD: CPT | Performed by: NURSE PRACTITIONER

## 2023-10-31 PROCEDURE — 3017F COLORECTAL CA SCREEN DOC REV: CPT | Performed by: NURSE PRACTITIONER

## 2023-10-31 PROCEDURE — 3023F SPIROM DOC REV: CPT | Performed by: NURSE PRACTITIONER

## 2023-10-31 PROCEDURE — G8484 FLU IMMUNIZE NO ADMIN: HCPCS | Performed by: NURSE PRACTITIONER

## 2023-10-31 PROCEDURE — G8417 CALC BMI ABV UP PARAM F/U: HCPCS | Performed by: NURSE PRACTITIONER

## 2023-10-31 PROCEDURE — 1036F TOBACCO NON-USER: CPT | Performed by: NURSE PRACTITIONER

## 2023-10-31 RX ORDER — ALBUTEROL SULFATE 2.5 MG/3ML
2.5 SOLUTION RESPIRATORY (INHALATION) EVERY 4 HOURS PRN
Qty: 360 EACH | Refills: 3 | Status: SHIPPED | OUTPATIENT
Start: 2023-10-31

## 2023-10-31 RX ORDER — ALBUTEROL SULFATE 90 UG/1
2 AEROSOL, METERED RESPIRATORY (INHALATION) EVERY 4 HOURS PRN
Qty: 1 EACH | Refills: 3 | Status: SHIPPED | OUTPATIENT
Start: 2023-10-31

## 2023-10-31 RX ORDER — UMECLIDINIUM BROMIDE AND VILANTEROL TRIFENATATE 62.5; 25 UG/1; UG/1
1 POWDER RESPIRATORY (INHALATION) DAILY
Qty: 3 EACH | Refills: 3 | Status: SHIPPED | OUTPATIENT
Start: 2023-10-31

## 2023-10-31 ASSESSMENT — ENCOUNTER SYMPTOMS
VOMITING: 0
DIARRHEA: 0
EYES NEGATIVE: 1
CHEST TIGHTNESS: 0
WHEEZING: 0
GASTROINTESTINAL NEGATIVE: 1
RESPIRATORY NEGATIVE: 1
STRIDOR: 0
NAUSEA: 0
ALLERGIC/IMMUNOLOGIC NEGATIVE: 1

## 2023-10-31 NOTE — PROGRESS NOTES
[facility]    Patient: Daralyn Goldmann, 68 y.o.   : 1950  10/31/2023    Pt of Nydia Montilla CNP     Subjective     Chief Complaint   Patient presents with    Follow-up     8 mo copd with ldct        HPI  Brennen Das is here for follow up for his moderate COPD with LDCT to review. Former smoker quit in  with a 40 PYH  PFT and ECHO never completed - ordered last year   LDCT done on 10/26/23- with no acute or worrisome findings   Current inhaler use of Anoro and Albuterol PRN for Sob/wheezing   BMI 31  A1AT MM- normal   No home oxygen use   SOB more with exertion   Intermittent coughing and wheezing- he feels related to sinus issues     Progress History:   Since last visit any new medical issues? No  New ER or hospital visits? No  Any new or changes in medicines? No  Using inhalers? Yes   Are they helpful?  Yes   Flu vaccine- UTD- 10/8/23  Pneumonia vaccine- UTD per patient   Covid vaccine- J&J only     Past Medical hx   PMH:  Past Medical History:   Diagnosis Date    Anxiety     Arthritis     Cancer ([address]) 2017    prostate cancer -    COPD exacerbation ([address]) 2020    Elevated PSA     Gout     History of heart artery stent 2019    LAD with Dr. Chapincito Mcwilliams at [facility]     Hypertension      SURGICAL HISTORY:  Past Surgical History:   Procedure Laterality Date    APPENDECTOMY  's    COLONOSCOPY  2009    KNEE SURGERY  2023    OTHER SURGICAL HISTORY      mole removal    OTHER SURGICAL HISTORY  2018    Heart Stent placement    PROSTATECTOMY N/A 10/06/2017    ROBOTIC RADICAL PROSTATECTOMY, BILATERAL PELVIC LYMPH NODE DISSECTION performed by Na Todd MD at [facility]  [address]     SOCIAL HISTORY:  Social History     Tobacco Use    Smoking status: Former     Packs/day: 1.00     Years: 44.00     Additional pack years: 0.00     Total pack years: 44.00     Types: Cigarettes     Quit date: 2020     Years since quitting: 3.8

## 2023-11-27 ENCOUNTER — HOSPITAL ENCOUNTER (EMERGENCY)
Age: 73
Discharge: HOME | End: 2023-11-27
Attending: EMERGENCY MEDICINE
Payer: MEDICARE

## 2023-11-27 VITALS
WEIGHT: 230 LBS | HEART RATE: 84 BPM | HEIGHT: 72 IN | TEMPERATURE: 97 F | SYSTOLIC BLOOD PRESSURE: 124 MMHG | RESPIRATION RATE: 16 BRPM | BODY MASS INDEX: 31.15 KG/M2 | OXYGEN SATURATION: 94 % | DIASTOLIC BLOOD PRESSURE: 82 MMHG

## 2023-11-27 DIAGNOSIS — J44.1 COPD EXACERBATION (HCC): Primary | ICD-10-CM

## 2023-11-27 LAB
FLUAV AG SPEC QL: NEGATIVE
FLUBV AG SPEC QL: NEGATIVE
SARS-COV-2 RDRP RESP QL NAA+PROBE: NOT  DETECTED

## 2023-11-27 PROCEDURE — 99283 EMERGENCY DEPT VISIT LOW MDM: CPT

## 2023-11-27 PROCEDURE — 87635 SARS-COV-2 COVID-19 AMP PRB: CPT

## 2023-11-27 PROCEDURE — 87804 INFLUENZA ASSAY W/OPTIC: CPT

## 2023-11-27 RX ORDER — AZITHROMYCIN 250 MG/1
250 TABLET, FILM COATED ORAL SEE ADMIN INSTRUCTIONS
Qty: 6 TABLET | Refills: 0 | Status: SHIPPED | OUTPATIENT
Start: 2023-11-27 | End: 2023-12-02

## 2023-11-27 RX ORDER — PREDNISONE 10 MG/1
TABLET ORAL
Qty: 36 TABLET | Refills: 0 | Status: SHIPPED | OUTPATIENT
Start: 2023-11-27

## 2023-11-27 ASSESSMENT — PAIN - FUNCTIONAL ASSESSMENT
PAIN_FUNCTIONAL_ASSESSMENT: NONE - DENIES PAIN
PAIN_FUNCTIONAL_ASSESSMENT: NONE - DENIES PAIN

## 2023-11-27 NOTE — ED NOTES
Pt pink, warm and dry, breathing with ease. No cough at this time. Prescriptions explained, pt states understanding. AVS reviewed. Denies questions or concerns. Pt remains alert and oriented. Pt discharged in stable condition.        Casimiro Ogden RN  11/27/23 0006

## 2023-11-27 NOTE — ED PROVIDER NOTES
[address]  Phone: [phone] [facility]      Pt Name: Anai Ayoub  MRN: [id number]  [facility] 1950  Date of evaluation: 11/27/2023  Provider: Nolan Wheeler MD    CHIEF COMPLAINT       Chief Complaint   Patient presents with    Shortness of Breath     SOB for a week and half. HISTORY OF PRESENT ILLNESS      Anai Ayoub is a 68 y.o. male who presents to the emergency department with above-noted complaint. Cough congestion not feeling good is been going on for the last week or so. Has had some shortness of breath and wheezing and congestion. Denies headache neck pain chest pain or abdominal pain        REVIEW OF SYSTEMS     Positive cough and URI. Maybe some dyspnea. Review of Systems  All systems negative except as marked.      PAST MEDICAL HISTORY     Past Medical History:   Diagnosis Date    Anxiety     Arthritis     Cancer ([facility]) 2017    prostate cancer 8-2017    COPD exacerbation ([facility]) 7/5/2020    Elevated PSA     Gout     History of heart artery stent 03/14/2019    LAD with Dr. Estefania Blue at [facility]     Hypertension          SURGICAL HISTORY       Past Surgical History:   Procedure Laterality Date    APPENDECTOMY  1970's    COLONOSCOPY  2009    KNEE SURGERY  03/2023    OTHER SURGICAL HISTORY  2013    mole removal    OTHER SURGICAL HISTORY  2018    Heart Stent placement    PROSTATECTOMY N/A 10/06/2017    ROBOTIC RADICAL PROSTATECTOMY, BILATERAL PELVIC LYMPH NODE DISSECTION performed by Torie Encinas MD at [address]  [address]         [address] [place] [address]       Discharge Medication List as of 11/27/2023  1:09 PM        CONTINUE these medications which have NOT CHANGED    Details   albuterol sulfate HFA (PROVENTIL HFA) 108 (90 Base) MCG/ACT inhaler Inhale 2 puffs into the lungs every 4 hours as needed for Wheezing or Shortness of Breath, Disp-1 each, R-3Normal      albuterol

## 2023-11-27 NOTE — DISCHARGE INSTRUCTIONS
Take prednisone daily. This will help decrease inflammation and wheezing. Continue your breathing treatments. Take Zithromax and daily. If this is bacterial related infection like bronchitis or pneumonia this will help clear your lungs and help improve breathing.   Follow-up with primary care or your lung specialist.

## 2024-01-01 NOTE — PROGRESS NOTES
-Stable on 2LPM- 90%  -OOB to chair  -Home today  -Afebrile  -SOB improved    -All other systems reviewed  Objective    Vitals    height is 6' (1.829 m) and weight is 205 lb (93 kg). His oral temperature is 98.1 °F (36.7 °C). His blood pressure is 136/101 (abnormal) and his pulse is 88. His respiration is 17 and oxygen saturation is 90%. O2 Flow Rate (L/min): 2 L/min  I/O    Intake/Output Summary (Last 24 hours) at 2/27/2020 1057  Last data filed at 2/27/2020 0512  Gross per 24 hour   Intake 1028.89 ml   Output 0 ml   Net 1028.89 ml     Patient Vitals for the past 96 hrs (Last 3 readings):   Weight   02/27/20 0512 205 lb (93 kg)   02/26/20 0445 204 lb 11.2 oz (92.9 kg)   02/25/20 0502 205 lb 4 oz (93.1 kg)     Exam    Physical Exam  Constitutional: Patient appears moderately built and moderately nourished. Stable on 2LPM. OOB to chair  Neck: Neck supple. No JVD or tracheal deviation present. Cardiovascular: Regular rate, regular rhythm, S1 and S2 with no murmur. 1+ BLE peripheral edema  Pulmonary/Chest: Normal effort with clear breath sounds with slightly diminished BLL. No stridor. No respiratory distress. No wheezing/rales heard  Abdominal: Soft. Bowel sounds audible. No distension or tenderness to palp  Musculoskeletal: Moves all extremities; no clubbing or cyanosis  Lymphadenopathy:  No cervical adenopathy. Neurological: Patient is alert and oriented to person, place, and time. Skin: Skin is warm and dry; no bruising or bleeding.      Meds       metoprolol succinate  25 mg Oral Daily    allopurinol  300 mg Oral Daily    albuterol  2.5 mg Nebulization Q4H WA    predniSONE  40 mg Oral Daily    chlorhexidine  15 mL Mouth/Throat BID    nicotine  1 patch Transdermal Daily    lisinopril  10 mg Oral Daily    hydroCHLOROthiazide  12.5 mg Oral Daily    atorvastatin  40 mg Oral Daily    clopidogrel  75 mg Oral Daily    glycopyrrolate-formoterol  2 puff Inhalation BID    magnesium replacement protocol   Other RX Placeholder    enoxaparin  40 mg Subcutaneous Daily         Labs   ABG  Lab Results   Component Value Date    PH 7.28 02/24/2020    PO2 82 02/24/2020    PCO2 52 02/24/2020    HCO3 24 02/24/2020    O2SAT 94 02/24/2020     No results found for: STACEY Burton  CBC  Recent Labs     02/25/20  0344 02/26/20  0546 02/27/20  0542   WBC 13.4* 13.0* 10.2   RBC 3.65* 3.84* 3.67*   HGB 11.4* 11.9* 11.4*   HCT 35.6* 38.3* 35.5*   MCV 97.5* 99.7* 96.7*   MCH 31.2 31.0 31.1   MCHC 32.0* 31.1* 32.1*    198 197   MPV 11.7 11.9 11.8      BMP  Recent Labs     02/25/20  0344 02/26/20  0546 02/27/20  0542    146* 146*   K 4.3 3.7 3.9    111 109   CO2 22* 20* 24   BUN 18 18 14   CREATININE 1.3* 1.1 1.2   GLUCOSE 132* 136* 84   MG 2.2  --   --    CALCIUM 8.1* 8.4* 8.3*     LFT  No results for input(s): AST, ALT, ALB, BILITOT, ALKPHOS, LIPASE in the last 72 hours. Invalid input(s): AMYLASE  TROP  Lab Results   Component Value Date    TROPONINT < 0.010 02/24/2020     BNP  No results found for: PROBNP  D-Dimer  No results found for: DDIMER  Lactic Acid  No results for input(s): LACTA in the last 72 hours. INR  No results for input(s): INR, PROTIME in the last 72 hours. PTT  No results for input(s): APTT in the last 72 hours. Glucose  No results for input(s): POCGLU in the last 72 hours. UA No results for input(s): SPECGRAV, PHUR, COLORU, CLARITYU, MUCUS, PROTEINU, BLOODU, RBCUA, WBCUA, BACTERIA, NITRU, GLUCOSEU, BILIRUBINUR, UROBILINOGEN, KETUA, LABCAST, LABCASTTY, AMORPHOS in the last 72 hours. Invalid input(s): CRYSTALS. EKG     Echo   NA  Cultures    Procalcitonin   Lab Results   Component Value Date    PROCAL 0.06 02/24/2020   MRSA (-)  VRE (-)  Rapid Influenza A/B (-)  Respiratory Culture: (-)  Urine Culture: (-)    PFT   NA  Bed side spirometry:   Date performed: 2/27/20  FEV1: Measurement:               43         % Predicted.   FEV1/FVC ratio : 62  FEF radiographs were reviewed. I Have discussed with SHANTHI Chen CNP about this patient in detail. The above assessment and plan has been reviewed. Please see my modifications mentioned below. My modifications:  Severe COPD. Follow up as above.     García Burns MD 2/27/2020 5:35 PM show

## 2024-10-28 ENCOUNTER — HOSPITAL ENCOUNTER (OUTPATIENT)
Dept: CT IMAGING | Age: 74
Discharge: HOME | End: 2024-10-28
Payer: MEDICARE

## 2024-10-28 DIAGNOSIS — Z87.891 PERSONAL HISTORY OF TOBACCO USE: ICD-10-CM

## 2024-10-28 PROCEDURE — 71271 CT THORAX LUNG CANCER SCR C-: CPT

## 2024-11-04 ASSESSMENT — ENCOUNTER SYMPTOMS
GASTROINTESTINAL NEGATIVE: 1
DIARRHEA: 0
STRIDOR: 0
VOMITING: 0
EYES NEGATIVE: 1
CHEST TIGHTNESS: 0
NAUSEA: 0

## 2024-11-04 NOTE — PROGRESS NOTES
[facility]    Patient: NAYELI GALLEGOS, 74 y.o.   : 1950      Subjective     Chief Complaint   Patient presents with    Follow-up     COPD 1 year f/u with [facility] 10/28/24        HPI  Nayeli Gallegos is a 74 y.o. old male came for follow up regarding his COPD.      Cough non-productive, without wheezing, dyspnea or hemoptysis  Other symptoms include SOB mainly with exertion only   No recent hospitalizations or emergency room visits.     He is using inhalers with good compliance.  He rarely uses rescue inhaler.  He is currently on 2LPM of home oxygen with rest and 3LPM with activity   PPD 1 Pack Years 44 Trying to Quit?  Yes 2020    Current inhalers include:  Anoro daily and Albuterol PRN     Last PFT/Spirometry: 3/10/2020 FEV1/FVC 68% FEV1 75%      GOLD/Group:  2  CAT score / MMRC:  13  Last 6 min walk:   11/3/2022  He has not been through pulmonary rehab in past.   A1A testing completed in past yes, MM- WNL  Pneumonia and influenza vaccination are not UTD     MMRC Dyspnea Scale:   0. Not troubled by breathlessness except on strenuous exercise   1. Short of breath when hurrying or walking up a slight hill   2. Walks slower than contemporaries on the level because of breathlessness, or has to stop for breath when walking at own pace   3. Stops for breath after about 100m or after a few minutes on the level   4. Too breathless to leave the house, or breathless when dressing or undressing    COPD Assessment Test (CAT)    I never cough 1, I cough all the time    I have no phlegm (mucus) in my chest at all. 1, My chest is completely full of phlegm (Mucus)    My chest does not feel tight at all 2, My chest feels very tight    When I walk up a hill or one flight of stairs I am not breathless 3,  When I walk up a hill or one flight of stairs I am very breathless    I am not limited doing any activities at home 2, I am very limited doing activities at home  I am

## 2024-11-05 ENCOUNTER — OFFICE VISIT (OUTPATIENT)
Dept: PULMONOLOGY | Age: 74
End: 2024-11-05

## 2024-11-05 VITALS
WEIGHT: 237.6 LBS | HEIGHT: 72 IN | OXYGEN SATURATION: 92 % | BODY MASS INDEX: 32.18 KG/M2 | TEMPERATURE: 98.4 F | HEART RATE: 79 BPM | DIASTOLIC BLOOD PRESSURE: 60 MMHG | SYSTOLIC BLOOD PRESSURE: 122 MMHG

## 2024-11-05 DIAGNOSIS — Z87.891 PERSONAL HISTORY OF TOBACCO USE: ICD-10-CM

## 2024-11-05 DIAGNOSIS — J44.9 COPD, MODERATE (HCC): Primary | ICD-10-CM

## 2024-11-05 RX ORDER — ALBUTEROL SULFATE 0.83 MG/ML
2.5 SOLUTION RESPIRATORY (INHALATION) EVERY 4 HOURS PRN
Qty: 360 EACH | Refills: 3 | Status: SHIPPED | OUTPATIENT
Start: 2024-11-05

## 2024-11-05 RX ORDER — UMECLIDINIUM BROMIDE AND VILANTEROL TRIFENATATE 62.5; 25 UG/1; UG/1
1 POWDER RESPIRATORY (INHALATION) DAILY
Qty: 3 EACH | Refills: 3 | Status: SHIPPED | OUTPATIENT
Start: 2024-11-05

## 2024-11-05 RX ORDER — ALBUTEROL SULFATE 90 UG/1
2 INHALANT RESPIRATORY (INHALATION) EVERY 4 HOURS PRN
Qty: 1 EACH | Refills: 3 | Status: SHIPPED | OUTPATIENT
Start: 2024-11-05

## 2024-11-05 ASSESSMENT — ENCOUNTER SYMPTOMS
SHORTNESS OF BREATH: 1
COUGH: 1
WHEEZING: 1

## 2024-11-05 NOTE — PATIENT INSTRUCTIONS
follow-up, he or she will help you understand what to do next.  After a lung cancer screening, you can go back to your usual activities right away.  A lung cancer screening test can't tell if you have lung cancer. If your results are positive, your doctor can't tell whether an abnormal finding is a harmless nodule, cancer, or something else without doing more tests.  What can you do to help prevent lung cancer?  Some lung cancers can't be prevented. But if you smoke, quitting smoking is the best step you can take to prevent lung cancer. If you want to quit, your doctor can recommend medicines or other ways to help.  Follow-up care is a key part of your treatment and safety. Be sure to make and go to all appointments, and call your doctor if you are having problems. It's also a good idea to know your test results and keep a list of the medicines you take.  Where can you learn more?  Go to https://www.[employer].net/patientEd and enter Q940 to learn more about \"Learning About Lung Cancer Screening.\"  Current as of: October 25, 2023  Content Version: 14.2  © 2024 [employer].   Care instructions adapted under license by [employer]. If you have questions about a medical condition or this instruction, always ask your healthcare professional. [employer] disclaims any warranty or liability for your use of this information.

## 2024-11-19 ENCOUNTER — HOSPITAL ENCOUNTER (EMERGENCY)
Age: 74
Discharge: HOME | End: 2024-11-19
Attending: EMERGENCY MEDICINE
Payer: MEDICARE

## 2024-11-19 VITALS
TEMPERATURE: 99.2 F | SYSTOLIC BLOOD PRESSURE: 138 MMHG | BODY MASS INDEX: 31.14 KG/M2 | HEIGHT: 73 IN | OXYGEN SATURATION: 92 % | RESPIRATION RATE: 21 BRPM | WEIGHT: 235 LBS | HEART RATE: 114 BPM | DIASTOLIC BLOOD PRESSURE: 90 MMHG

## 2024-11-19 DIAGNOSIS — J44.1 COPD EXACERBATION (HCC): Primary | ICD-10-CM

## 2024-11-19 DIAGNOSIS — J06.9 UPPER RESPIRATORY TRACT INFECTION, UNSPECIFIED TYPE: ICD-10-CM

## 2024-11-19 PROCEDURE — 99283 EMERGENCY DEPT VISIT LOW MDM: CPT

## 2024-11-19 PROCEDURE — 6370000000 HC RX 637 (ALT 250 FOR IP): Performed by: EMERGENCY MEDICINE

## 2024-11-19 RX ORDER — IPRATROPIUM BROMIDE AND ALBUTEROL SULFATE 2.5; .5 MG/3ML; MG/3ML
1 SOLUTION RESPIRATORY (INHALATION) ONCE
Status: COMPLETED | OUTPATIENT
Start: 2024-11-19 | End: 2024-11-19

## 2024-11-19 RX ORDER — LEVOFLOXACIN 500 MG/1
500 TABLET, FILM COATED ORAL DAILY
Qty: 7 TABLET | Refills: 0 | Status: SHIPPED | OUTPATIENT
Start: 2024-11-19 | End: 2024-11-26

## 2024-11-19 RX ORDER — LEVOFLOXACIN 500 MG/1
500 TABLET, FILM COATED ORAL ONCE
Status: COMPLETED | OUTPATIENT
Start: 2024-11-19 | End: 2024-11-19

## 2024-11-19 RX ORDER — PREDNISONE 20 MG/1
60 TABLET ORAL ONCE
Status: COMPLETED | OUTPATIENT
Start: 2024-11-19 | End: 2024-11-19

## 2024-11-19 RX ORDER — LEVOCETIRIZINE DIHYDROCHLORIDE 5 MG/1
5 TABLET, FILM COATED ORAL NIGHTLY
COMMUNITY

## 2024-11-19 RX ORDER — PREDNISONE 10 MG/1
TABLET ORAL
Qty: 30 TABLET | Refills: 0 | Status: SHIPPED | OUTPATIENT
Start: 2024-11-19

## 2024-11-19 RX ADMIN — IPRATROPIUM BROMIDE AND ALBUTEROL SULFATE 1 DOSE: .5; 3 SOLUTION RESPIRATORY (INHALATION) at 23:19

## 2024-11-19 RX ADMIN — LEVOFLOXACIN 500 MG: 500 TABLET, FILM COATED ORAL at 23:23

## 2024-11-19 RX ADMIN — PREDNISONE 60 MG: 20 TABLET ORAL at 23:24

## 2024-11-20 NOTE — ED PROVIDER NOTES
for subsequent skin infection.  Counseled regards to care and treatment and evaluation at this time.  Could be viral syndrome given persistent cough however does have a history of COPD.  Recommend further evaluation given steroid antibiotics at this time cover for appropriate respiratory judit.    1)  Number and Complexity of Problems  Problem List This Visit: Wheezing shortness of breath  Problem List Items Addressed This Visit       COPD exacerbation (HCC) - Primary    Relevant Medications    levocetirizine (XYZAL) 5 MG tablet    predniSONE (DELTASONE) 10 MG tablet     Other Visit Diagnoses       Upper respiratory tract infection, unspecified type                Differential Diagnosis: Viral syndrome, COVID, flu, bronchitis, pneumonia, CHF      2)  Data Reviewed    Imaging that is independently reviewed with the associated radiologist report, not interpreted by me are:  Not applicable    Imaging that is independently reviewed and interpreted by me as:  Not applicable        See more data below for the lab and radiology tests and orders.    3)  Treatment and Disposition    Shared Decision Making:  Not applicable    Decision Rules/Scores utilized:  Not applicable    FINAL  REASSESSMENT     11:41 PM     Feels short of breath and improvement immensely after DuoNeb here.  Counseled regards to care and treatment.  Treated with steroids and antibiotics.      PROCEDURES:   none    Procedures     FINAL IMPRESSION      1. COPD exacerbation (HCC)    2. Upper respiratory tract infection, unspecified type          DISPOSITION/PLAN     DISPOSITION Decision To Discharge 11/19/2024 11:40:30 PM           PATIENT REFERRED TO:  Zaira Kathleen, AIDA - CNP  [address] [facility] [address] [phone]    Call   Follow up from ER condition      DISCHARGE MEDICATIONS:  New Prescriptions    LEVOFLOXACIN (LEVAQUIN) 500 MG TABLET    Take 1 tablet by mouth daily for 7 days    PREDNISONE (DELTASONE) 10 MG TABLET    4 p.o. q. day for 3

## 2024-11-20 NOTE — ED NOTES
C/o sob/wheezing that has been going on for 4 days. Worsening s/s today after he cut an exotic wood with a mask on.  History of copd exacerbations. Using nebulizer at home without relief. Triage exam room 7. Audible wheezes heard upon arrival. Pt ambulated to room

## 2024-11-20 NOTE — ED NOTES
Pt med administered. Education complete. States he feels much better upon completion of breathing treatment. Registration at bedside.

## 2024-11-20 NOTE — DISCHARGE INSTRUCTIONS
Take antibiotic Levaquin daily.  Treat potential pneumonia and bronchitis due to bacteria.  This is viral will not help.  Take prednisone daily next dose tomorrow.  This will decrease inflammation in your lungs and help with breathing.  Continue albuterol at home/Proventil.  Call your primary care doctor to discuss further plan and follow-up.  If you develop worsening symptoms of shortness of breath or further problems please proceed to the main [facility] for further breathing treatments, admission and evaluation.

## 2024-11-20 NOTE — DISCHARGE INSTR - COC
Continuity of Care Form    Patient Name: Markel Salas   :  1950  MRN:  [id number]    Admit date:  2024  Discharge date:  ***    Code Status Order: Prior   Advance Directives:   Advance Care Flowsheet Documentation             Admitting Physician:  No admitting provider for patient encounter.  PCP: Zaira Kathleen APRN - CNP    Discharging Nurse: ***  Discharging Hospital Unit/Room#: E7/E7  Discharging Unit Phone Number: ***    Emergency Contact:   Extended Emergency Contact Information  Primary Emergency Contact: Lea Salas  Address: [address] [place] [id number] [place]  Home Phone: [phone]  Mobile Phone: [phone]  Relation: Spouse    Past Surgical History:  Past Surgical History:   Procedure Laterality Date    APPENDECTOMY      COLONOSCOPY  2009    KNEE SURGERY  2023    OTHER SURGICAL HISTORY      mole removal    OTHER SURGICAL HISTORY      Heart Stent placement    PROSTATECTOMY N/A 10/06/2017    ROBOTIC RADICAL PROSTATECTOMY, BILATERAL PELVIC LYMPH NODE DISSECTION performed by Spencre Turner MD at [facility] OR    TONSILLECTOMY         Immunization History:   Immunization History   Administered Date(s) Administered    COVID-19, J&J, (age 18y+), IM, 0.5 mL 03/10/2021    Influenza Virus Vaccine 2017    Influenza, FLUAD, (age 65 y+), IM, Trivalent PF, 0.5mL 11/15/2019    Influenza, FLUARIX, FLULAVAL, FLUZONE (age 6 mo+) and AFLURIA, (age 3 y+), Quadv PF, 0.5mL 2018, 09/10/2020    Pneumococcal, PCV-13, PREVNAR 13, (age 6w+), IM, 0.5mL 2017, 2017, 2019    TDaP, ADACEL (age 10y-64y), BOOSTRIX (age 10y+), IM, 0.5mL 2017    Zoster Live (Zostavax) 2017, 2017    Zoster Recombinant (Shingrix) 09/10/2020, 2020       Active Problems:  Patient Active Problem List   Diagnosis Code    Elevated PSA R97.20    Prostate cancer (HCC) C61    Acute respiratory failure J96.00    COPD exacerbation

## 2024-12-19 ENCOUNTER — TELEPHONE (OUTPATIENT)
Dept: PULMONOLOGY | Age: 74
End: 2024-12-19

## (undated) DEVICE — AGENT HEMSTAT W2XL14IN OXIDIZED REGENERATED CELOS ABSRB FOR

## (undated) DEVICE — CONTAINER,SPECIMEN,PNEU TUBE,4OZ,OR STRL: Brand: MEDLINE

## (undated) DEVICE — SYRINGE,PISTON,IRRIGATION,60ML,STERILE: Brand: MEDLINE

## (undated) DEVICE — DRAPE,ROBOTICS,STERILE: Brand: MEDLINE

## (undated) DEVICE — INTENDED FOR TISSUE SEPARATION, AND OTHER PROCEDURES THAT REQUIRE A SHARP SURGICAL BLADE TO PUNCTURE OR CUT.: Brand: BARD-PARKER ® CARBON RIB-BACK BLADES

## (undated) DEVICE — COLUMN DRAPE

## (undated) DEVICE — HYPODERMIC SAFETY NEEDLE: Brand: MAGELLAN

## (undated) DEVICE — JELLY,LUBE,STERILE,FLIP TOP,TUBE,2-OZ: Brand: MEDLINE

## (undated) DEVICE — SOLUTION ANTIFOG VIS SYS CLEARIFY LAPSCP

## (undated) DEVICE — ROYAL SILK SURGICAL GOWN, XXL: Brand: CONVERTORS

## (undated) DEVICE — BLADE CLIPPER GEN PURP NS

## (undated) DEVICE — CHLORAPREP 26ML ORANGE

## (undated) DEVICE — PACK PROC LAP II AURORA

## (undated) DEVICE — 3M™ TEGADERM™ TRANSPARENT FILM DRESSING FRAME STYLE, 1626W, 4 IN X 4-3/4 IN (10 CM X 12 CM), 50/CT 4CT/CASE: Brand: 3M™ TEGADERM™

## (undated) DEVICE — CATHETER F BLLN 5CC 18FR 2 W HYDRGEL COAT LESS TRAUM LUB

## (undated) DEVICE — 3M™ TEGADERM™ TRANSPARENT FILM DRESSING FRAME STYLE, 1624W, 2-3/8 IN X 2-3/4 IN (6 CM X 7 CM), 100/CT 4CT/CASE: Brand: 3M™ TEGADERM™

## (undated) DEVICE — SYRINGE MED 30ML STD CLR PLAS LUERLOCK TIP N CTRL DISP

## (undated) DEVICE — SYRINGE MED 10ML LUERLOCK TIP W/O SFTY DISP

## (undated) DEVICE — Z DUP USE 2641840 CLIP INT L POLYMER LOK LIG HEM O LOK

## (undated) DEVICE — MEDI-VAC YANKAUER SUCTION HANDLE W/BULBOUS TIP: Brand: CARDINAL HEALTH

## (undated) DEVICE — SOLUTION IV 1000ML 0.9% SOD CHL PH 5 INJ USP VIAFLX PLAS

## (undated) DEVICE — TISSUE RETRIEVAL SYSTEM: Brand: INZII RETRIEVAL SYSTEM

## (undated) DEVICE — BASIC SINGLE BASIN 1-LF: Brand: MEDLINE INDUSTRIES, INC.

## (undated) DEVICE — CANNULA SEAL

## (undated) DEVICE — GLOVE ORANGE PI 7   MSG9070

## (undated) DEVICE — GOWN,SIRUS,NONRNF,SETINSLV,XL,20/CS: Brand: MEDLINE

## (undated) DEVICE — BLADELESS OBTURATOR: Brand: WECK VISTA

## (undated) DEVICE — TIP COVER ACCESSORY

## (undated) DEVICE — ELECTRO LUBE IS A SINGLE PATIENT USE DEVICE THAT IS INTENDED TO BE USED ON ELECTROSURGICAL ELECTRODES TO REDUCE STICKING.: Brand: KEY SURGICAL ELECTRO LUBE

## (undated) DEVICE — CATHETER URETH 20FR BLLN 5CC STD LTX HYDRGEL 2 W F BARDX

## (undated) DEVICE — BAG DRNGE 2000ML UROLOGY ANTI REFLX CHMBR SAMP PRT

## (undated) DEVICE — GAUZE,SPONGE,2"X2",8PLY,STERILE,LF,2'S: Brand: MEDLINE

## (undated) DEVICE — 3M™ WARMING BLANKET, UPPER BODY, 10 PER CASE, 42268: Brand: BAIR HUGGER™

## (undated) DEVICE — PACK PROCEDURE SURG SET UP SRMC

## (undated) DEVICE — POSITIONER HD W8XH4XL8.5IN RASPBERRY FOAM SLT

## (undated) DEVICE — ARM DRAPE

## (undated) DEVICE — GAUZE,SPONGE,8"X4",12PLY,XRAY,STRL,LF: Brand: MEDLINE

## (undated) DEVICE — SUTURE MCRYL SZ 3-0 L27IN ABSRB VLT L17MM RB-1 1/2 CIR Y305H

## (undated) DEVICE — MEDI-VAC NON-CONDUCTIVE SUCTION TUBING 6MM X 6.1M (20 FT.) L: Brand: CARDINAL HEALTH

## (undated) DEVICE — COVER EQUIP STEEP TREND EZ CLN UP WTRPRF DISP FOR STD SZ

## (undated) DEVICE — SUTURE MCRYL SZ 3-0 L27IN ABSRB UD L17MM RB-1 1/2 CIR Y215H

## (undated) DEVICE — SUREFIT, DUAL DISPERSIVE ELECTRODE, CONTACT QUALITY MONITOR: Brand: SUREFIT

## (undated) DEVICE — Device